# Patient Record
Sex: MALE
[De-identification: names, ages, dates, MRNs, and addresses within clinical notes are randomized per-mention and may not be internally consistent; named-entity substitution may affect disease eponyms.]

---

## 2022-10-18 PROBLEM — Z00.00 ENCOUNTER FOR PREVENTIVE HEALTH EXAMINATION: Status: ACTIVE | Noted: 2022-10-18

## 2022-10-21 ENCOUNTER — APPOINTMENT (OUTPATIENT)
Dept: ULTRASOUND IMAGING | Facility: IMAGING CENTER | Age: 72
End: 2022-10-21

## 2022-11-22 ENCOUNTER — INPATIENT (INPATIENT)
Facility: HOSPITAL | Age: 72
LOS: 7 days | Discharge: HOME | DRG: 640 | End: 2022-11-30
Attending: INTERNAL MEDICINE | Admitting: INTERNAL MEDICINE
Payer: MEDICARE

## 2022-11-22 VITALS
RESPIRATION RATE: 16 BRPM | SYSTOLIC BLOOD PRESSURE: 95 MMHG | WEIGHT: 154.98 LBS | DIASTOLIC BLOOD PRESSURE: 58 MMHG | OXYGEN SATURATION: 98 % | HEART RATE: 92 BPM | TEMPERATURE: 98 F | HEIGHT: 69 IN

## 2022-11-22 DIAGNOSIS — Z29.9 ENCOUNTER FOR PROPHYLACTIC MEASURES, UNSPECIFIED: ICD-10-CM
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
## 2022-11-22 DIAGNOSIS — C25.9 MALIGNANT NEOPLASM OF PANCREAS, UNSPECIFIED: ICD-10-CM

## 2022-11-22 DIAGNOSIS — R29.6 REPEATED FALLS: ICD-10-CM

## 2022-11-22 DIAGNOSIS — U07.1 COVID-19: ICD-10-CM

## 2022-11-22 DIAGNOSIS — I48.91 UNSPECIFIED ATRIAL FIBRILLATION: ICD-10-CM

## 2022-11-22 DIAGNOSIS — R11.2 NAUSEA WITH VOMITING, UNSPECIFIED: ICD-10-CM

## 2022-11-22 DIAGNOSIS — I10 ESSENTIAL (PRIMARY) HYPERTENSION: ICD-10-CM

## 2022-11-22 DIAGNOSIS — E86.0 DEHYDRATION: ICD-10-CM

## 2022-11-22 DIAGNOSIS — Z86.73 PERSONAL HISTORY OF TRANSIENT ISCHEMIC ATTACK (TIA), AND CEREBRAL INFARCTION WITHOUT RESIDUAL DEFICITS: ICD-10-CM

## 2022-11-22 LAB
ACETONE SERPL-MCNC: NEGATIVE — SIGNIFICANT CHANGE UP
ALBUMIN SERPL ELPH-MCNC: 2.9 G/DL — LOW (ref 3.5–5)
ALP SERPL-CCNC: 593 U/L — HIGH (ref 40–120)
ALT FLD-CCNC: 309 U/L DA — HIGH (ref 10–60)
ANION GAP SERPL CALC-SCNC: 7 MMOL/L — SIGNIFICANT CHANGE UP (ref 5–17)
ANISOCYTOSIS BLD QL: SLIGHT — SIGNIFICANT CHANGE UP
APPEARANCE UR: CLEAR — SIGNIFICANT CHANGE UP
APTT BLD: 36 SEC — HIGH (ref 27.5–35.5)
AST SERPL-CCNC: 113 U/L — HIGH (ref 10–40)
BACTERIA # UR AUTO: ABNORMAL /HPF
BASOPHILS # BLD AUTO: 0 K/UL — SIGNIFICANT CHANGE UP (ref 0–0.2)
BASOPHILS NFR BLD AUTO: 0 % — SIGNIFICANT CHANGE UP (ref 0–2)
BILIRUB SERPL-MCNC: 1.5 MG/DL — HIGH (ref 0.2–1.2)
BILIRUB UR-MCNC: NEGATIVE — SIGNIFICANT CHANGE UP
BUN SERPL-MCNC: 14 MG/DL — SIGNIFICANT CHANGE UP (ref 7–18)
CALCIUM SERPL-MCNC: 9.1 MG/DL — SIGNIFICANT CHANGE UP (ref 8.4–10.5)
CHLORIDE SERPL-SCNC: 102 MMOL/L — SIGNIFICANT CHANGE UP (ref 96–108)
CO2 SERPL-SCNC: 29 MMOL/L — SIGNIFICANT CHANGE UP (ref 22–31)
COD CRY URNS QL: ABNORMAL /HPF
COLOR SPEC: ABNORMAL
COMMENT - URINE: SIGNIFICANT CHANGE UP
CREAT SERPL-MCNC: 0.77 MG/DL — SIGNIFICANT CHANGE UP (ref 0.5–1.3)
DIFF PNL FLD: NEGATIVE — SIGNIFICANT CHANGE UP
EGFR: 95 ML/MIN/1.73M2 — SIGNIFICANT CHANGE UP
EOSINOPHIL # BLD AUTO: 0.01 K/UL — SIGNIFICANT CHANGE UP (ref 0–0.5)
EOSINOPHIL NFR BLD AUTO: 0.3 % — SIGNIFICANT CHANGE UP (ref 0–6)
EPI CELLS # UR: SIGNIFICANT CHANGE UP /HPF
GLUCOSE BLDC GLUCOMTR-MCNC: 121 MG/DL — HIGH (ref 70–99)
GLUCOSE SERPL-MCNC: 154 MG/DL — HIGH (ref 70–99)
GLUCOSE UR QL: NEGATIVE — SIGNIFICANT CHANGE UP
HCT VFR BLD CALC: 32.7 % — LOW (ref 39–50)
HGB BLD-MCNC: 10.8 G/DL — LOW (ref 13–17)
IMM GRANULOCYTES NFR BLD AUTO: 1.3 % — HIGH (ref 0–0.9)
INR BLD: 1.28 RATIO — HIGH (ref 0.88–1.16)
KETONES UR-MCNC: ABNORMAL
LEUKOCYTE ESTERASE UR-ACNC: ABNORMAL
LIDOCAIN IGE QN: 26 U/L — LOW (ref 73–393)
LYMPHOCYTES # BLD AUTO: 0.16 K/UL — LOW (ref 1–3.3)
LYMPHOCYTES # BLD AUTO: 5.2 % — LOW (ref 13–44)
MAGNESIUM SERPL-MCNC: 2.4 MG/DL — SIGNIFICANT CHANGE UP (ref 1.6–2.6)
MANUAL SMEAR VERIFICATION: SIGNIFICANT CHANGE UP
MCHC RBC-ENTMCNC: 31.9 PG — SIGNIFICANT CHANGE UP (ref 27–34)
MCHC RBC-ENTMCNC: 33 GM/DL — SIGNIFICANT CHANGE UP (ref 32–36)
MCV RBC AUTO: 96.5 FL — SIGNIFICANT CHANGE UP (ref 80–100)
MONOCYTES # BLD AUTO: 0.03 K/UL — SIGNIFICANT CHANGE UP (ref 0–0.9)
MONOCYTES NFR BLD AUTO: 1 % — LOW (ref 2–14)
NEUTROPHILS # BLD AUTO: 2.85 K/UL — SIGNIFICANT CHANGE UP (ref 1.8–7.4)
NEUTROPHILS NFR BLD AUTO: 92.2 % — HIGH (ref 43–77)
NITRITE UR-MCNC: NEGATIVE — SIGNIFICANT CHANGE UP
NRBC # BLD: 0 /100 WBCS — SIGNIFICANT CHANGE UP (ref 0–0)
PH UR: 7 — SIGNIFICANT CHANGE UP (ref 5–8)
PLAT MORPH BLD: NORMAL — SIGNIFICANT CHANGE UP
PLATELET # BLD AUTO: 104 K/UL — LOW (ref 150–400)
PLATELET COUNT - ESTIMATE: ABNORMAL
POIKILOCYTOSIS BLD QL AUTO: SLIGHT — SIGNIFICANT CHANGE UP
POTASSIUM SERPL-MCNC: 4.2 MMOL/L — SIGNIFICANT CHANGE UP (ref 3.5–5.3)
POTASSIUM SERPL-SCNC: 4.2 MMOL/L — SIGNIFICANT CHANGE UP (ref 3.5–5.3)
PROT SERPL-MCNC: 6 G/DL — SIGNIFICANT CHANGE UP (ref 6–8.3)
PROT UR-MCNC: 30 MG/DL
PROTHROM AB SERPL-ACNC: 15.3 SEC — HIGH (ref 10.5–13.4)
RAPID RVP RESULT: DETECTED
RBC # BLD: 3.39 M/UL — LOW (ref 4.2–5.8)
RBC # FLD: 12.4 % — SIGNIFICANT CHANGE UP (ref 10.3–14.5)
RBC BLD AUTO: ABNORMAL
RBC CASTS # UR COMP ASSIST: SIGNIFICANT CHANGE UP /HPF (ref 0–2)
SARS-COV-2 RNA SPEC QL NAA+PROBE: DETECTED
SODIUM SERPL-SCNC: 138 MMOL/L — SIGNIFICANT CHANGE UP (ref 135–145)
SP GR SPEC: 1.01 — SIGNIFICANT CHANGE UP (ref 1.01–1.02)
TROPONIN I, HIGH SENSITIVITY RESULT: 5.5 NG/L — SIGNIFICANT CHANGE UP
UROBILINOGEN FLD QL: 8
WBC # BLD: 3.09 K/UL — LOW (ref 3.8–10.5)
WBC # FLD AUTO: 3.09 K/UL — LOW (ref 3.8–10.5)
WBC UR QL: SIGNIFICANT CHANGE UP /HPF (ref 0–5)

## 2022-11-22 PROCEDURE — 99285 EMERGENCY DEPT VISIT HI MDM: CPT | Mod: CS

## 2022-11-22 PROCEDURE — 71045 X-RAY EXAM CHEST 1 VIEW: CPT | Mod: 26

## 2022-11-22 RX ORDER — SENNA PLUS 8.6 MG/1
1 TABLET ORAL AT BEDTIME
Refills: 0 | Status: DISCONTINUED | OUTPATIENT
Start: 2022-11-22 | End: 2022-11-30

## 2022-11-22 RX ORDER — DOXAZOSIN MESYLATE 4 MG
4 TABLET ORAL AT BEDTIME
Refills: 0 | Status: DISCONTINUED | OUTPATIENT
Start: 2022-11-22 | End: 2022-11-30

## 2022-11-22 RX ORDER — ATORVASTATIN CALCIUM 80 MG/1
20 TABLET, FILM COATED ORAL AT BEDTIME
Refills: 0 | Status: DISCONTINUED | OUTPATIENT
Start: 2022-11-22 | End: 2022-11-30

## 2022-11-22 RX ORDER — ONDANSETRON 8 MG/1
8 TABLET, FILM COATED ORAL EVERY 6 HOURS
Refills: 0 | Status: DISCONTINUED | OUTPATIENT
Start: 2022-11-22 | End: 2022-11-24

## 2022-11-22 RX ORDER — ACETAMINOPHEN 500 MG
650 TABLET ORAL EVERY 6 HOURS
Refills: 0 | Status: DISCONTINUED | OUTPATIENT
Start: 2022-11-22 | End: 2022-11-30

## 2022-11-22 RX ORDER — HYDROMORPHONE HYDROCHLORIDE 2 MG/ML
2 INJECTION INTRAMUSCULAR; INTRAVENOUS; SUBCUTANEOUS EVERY 6 HOURS
Refills: 0 | Status: DISCONTINUED | OUTPATIENT
Start: 2022-11-22 | End: 2022-11-23

## 2022-11-22 RX ORDER — DEXAMETHASONE 0.5 MG/5ML
6 ELIXIR ORAL ONCE
Refills: 0 | Status: COMPLETED | OUTPATIENT
Start: 2022-11-22 | End: 2022-11-22

## 2022-11-22 RX ORDER — FAMOTIDINE 10 MG/ML
20 INJECTION INTRAVENOUS ONCE
Refills: 0 | Status: COMPLETED | OUTPATIENT
Start: 2022-11-22 | End: 2022-11-22

## 2022-11-22 RX ORDER — DILTIAZEM HCL 120 MG
120 CAPSULE, EXT RELEASE 24 HR ORAL DAILY
Refills: 0 | Status: DISCONTINUED | OUTPATIENT
Start: 2022-11-22 | End: 2022-11-30

## 2022-11-22 RX ORDER — LANOLIN ALCOHOL/MO/W.PET/CERES
3 CREAM (GRAM) TOPICAL AT BEDTIME
Refills: 0 | Status: DISCONTINUED | OUTPATIENT
Start: 2022-11-22 | End: 2022-11-30

## 2022-11-22 RX ORDER — SODIUM CHLORIDE 9 MG/ML
1000 INJECTION, SOLUTION INTRAVENOUS
Refills: 0 | Status: DISCONTINUED | OUTPATIENT
Start: 2022-11-22 | End: 2022-11-29

## 2022-11-22 RX ORDER — DABIGATRAN ETEXILATE MESYLATE 150 MG/1
150 CAPSULE ORAL EVERY 12 HOURS
Refills: 0 | Status: DISCONTINUED | OUTPATIENT
Start: 2022-11-22 | End: 2022-11-30

## 2022-11-22 RX ORDER — ONDANSETRON 8 MG/1
4 TABLET, FILM COATED ORAL ONCE
Refills: 0 | Status: COMPLETED | OUTPATIENT
Start: 2022-11-22 | End: 2022-11-22

## 2022-11-22 RX ORDER — NALOXONE HYDROCHLORIDE 4 MG/.1ML
4 SPRAY NASAL ONCE
Refills: 0 | Status: DISCONTINUED | OUTPATIENT
Start: 2022-11-22 | End: 2022-11-30

## 2022-11-22 RX ORDER — SODIUM CHLORIDE 9 MG/ML
1000 INJECTION INTRAMUSCULAR; INTRAVENOUS; SUBCUTANEOUS
Refills: 0 | Status: DISCONTINUED | OUTPATIENT
Start: 2022-11-22 | End: 2022-11-22

## 2022-11-22 RX ORDER — FENTANYL CITRATE 50 UG/ML
1 INJECTION INTRAVENOUS
Refills: 0 | Status: DISCONTINUED | OUTPATIENT
Start: 2022-11-22 | End: 2022-11-23

## 2022-11-22 RX ORDER — HYDROMORPHONE HYDROCHLORIDE 2 MG/ML
2 INJECTION INTRAMUSCULAR; INTRAVENOUS; SUBCUTANEOUS EVERY 8 HOURS
Refills: 0 | Status: DISCONTINUED | OUTPATIENT
Start: 2022-11-22 | End: 2022-11-22

## 2022-11-22 RX ORDER — AMIODARONE HYDROCHLORIDE 400 MG/1
100 TABLET ORAL DAILY
Refills: 0 | Status: DISCONTINUED | OUTPATIENT
Start: 2022-11-22 | End: 2022-11-30

## 2022-11-22 RX ORDER — LOSARTAN POTASSIUM 100 MG/1
100 TABLET, FILM COATED ORAL DAILY
Refills: 0 | Status: DISCONTINUED | OUTPATIENT
Start: 2022-11-22 | End: 2022-11-30

## 2022-11-22 RX ORDER — SODIUM CHLORIDE 9 MG/ML
1000 INJECTION INTRAMUSCULAR; INTRAVENOUS; SUBCUTANEOUS ONCE
Refills: 0 | Status: COMPLETED | OUTPATIENT
Start: 2022-11-22 | End: 2022-11-22

## 2022-11-22 RX ADMIN — ATORVASTATIN CALCIUM 20 MILLIGRAM(S): 80 TABLET, FILM COATED ORAL at 22:13

## 2022-11-22 RX ADMIN — FAMOTIDINE 20 MILLIGRAM(S): 10 INJECTION INTRAVENOUS at 13:33

## 2022-11-22 RX ADMIN — HYDROMORPHONE HYDROCHLORIDE 2 MILLIGRAM(S): 2 INJECTION INTRAMUSCULAR; INTRAVENOUS; SUBCUTANEOUS at 21:10

## 2022-11-22 RX ADMIN — SENNA PLUS 1 TABLET(S): 8.6 TABLET ORAL at 22:14

## 2022-11-22 RX ADMIN — FENTANYL CITRATE 1 PATCH: 50 INJECTION INTRAVENOUS at 20:17

## 2022-11-22 RX ADMIN — ONDANSETRON 8 MILLIGRAM(S): 8 TABLET, FILM COATED ORAL at 20:12

## 2022-11-22 RX ADMIN — SODIUM CHLORIDE 100 MILLILITER(S): 9 INJECTION, SOLUTION INTRAVENOUS at 18:46

## 2022-11-22 RX ADMIN — Medication 4 MILLIGRAM(S): at 22:13

## 2022-11-22 RX ADMIN — DABIGATRAN ETEXILATE MESYLATE 150 MILLIGRAM(S): 150 CAPSULE ORAL at 18:45

## 2022-11-22 RX ADMIN — Medication 6 MILLIGRAM(S): at 18:45

## 2022-11-22 RX ADMIN — SODIUM CHLORIDE 1000 MILLILITER(S): 9 INJECTION INTRAMUSCULAR; INTRAVENOUS; SUBCUTANEOUS at 14:30

## 2022-11-22 RX ADMIN — SODIUM CHLORIDE 125 MILLILITER(S): 9 INJECTION INTRAMUSCULAR; INTRAVENOUS; SUBCUTANEOUS at 13:30

## 2022-11-22 RX ADMIN — SODIUM CHLORIDE 1000 MILLILITER(S): 9 INJECTION INTRAMUSCULAR; INTRAVENOUS; SUBCUTANEOUS at 13:30

## 2022-11-22 RX ADMIN — ONDANSETRON 4 MILLIGRAM(S): 8 TABLET, FILM COATED ORAL at 13:20

## 2022-11-22 RX ADMIN — FENTANYL CITRATE 1 PATCH: 50 INJECTION INTRAVENOUS at 18:46

## 2022-11-22 RX ADMIN — HYDROMORPHONE HYDROCHLORIDE 2 MILLIGRAM(S): 2 INJECTION INTRAMUSCULAR; INTRAVENOUS; SUBCUTANEOUS at 20:12

## 2022-11-22 NOTE — H&P ADULT - ATTENDING COMMENTS
72 male PMHx HTN, Afib (on pradaxa), and recently dx stage IV pancreatic cancer (inoperable, on chemo) presents with intractable nausea and vomiting for 2 days. Patient was diagnosed with pancreatic cancer at [facility] during a hospital admission for CVA. Pt was recently started on chemotherapy and received his last session this past friday 11/18. Since then patient has had severe nausea and multiple episodes of NBNB vomiting unrelieved by his compazine or prochlorperazine which he was prescribed by his oncologist. Patient has also had loss of appetite unrelated to his nausea and epigastric pain. Patient endorses dizziness as well and had a presyncopal episode this morning, denies head trauma or loss of consciousness Patient denies fever chills, headache, chest pain, palpitations, cough, SOB, diarrhea/constipation, dysuria or leg swelling. NKDA. [place] Lul[place] [facility] [place]  seen an dexamiend vsstable afebrile alert awake not in nay distress  metast ca pancrease new on chemo  2nd session received and since feeling very weak, nausea and vomiting  denies fever, dysurea, cough, sore throat    alert awake in isolated room ( asymptomatic COVID)  alert awake  physical doen ok  lungs heart ok  abd soft   labs noted wbc 3.09  hgb 10.8  plat 104   AP over 593 , , ALT OVER 300   CXR NEG.  UA PENDING   PANCULTURES  NC DOESN'T SEEM NEUTROPENIC  ONCOLOGY ,  CONSULLT WATCH FOR ANY FEVER   o2 SAT IS OK

## 2022-11-22 NOTE — ED ADULT NURSE NOTE - OBJECTIVE STATEMENT
pt is here for dizziness.  pt stated that pancreas CA, c/o dizziness and weakness, last chemo was Friday, denied chest pain or sob,

## 2022-11-22 NOTE — ED PROVIDER NOTE - CARE PLAN
1 Principal Discharge DX:	Dehydration  Secondary Diagnosis:	Hyperglycemia  Secondary Diagnosis:	Weakness

## 2022-11-22 NOTE — ED ADULT NURSE NOTE - NSIMPLEMENTINTERV_GEN_ALL_ED
Implemented All Fall Risk Interventions:  [place] to call system. Call bell, personal items and telephone within reach. Instruct patient to call for assistance. Room bathroom lighting operational. Non-slip footwear when patient is off stretcher. Physically safe environment: no spills, clutter or unnecessary equipment. Stretcher in lowest position, wheels locked, appropriate side rails in place. Provide visual cue, wrist band, yellow gown, etc. Monitor gait and stability. Monitor for mental status changes and reorient to person, place, and time. Review medications for side effects contributing to fall risk. Reinforce activity limits and safety measures with patient and family.

## 2022-11-22 NOTE — ED ADULT TRIAGE NOTE - HEIGHT IN FEET
"[facility]  Adult Nutrition  Progress Note    SUMMARY       Recommendations    Recommendation:   1. Encourage intake at meals as tolerated.   2. Await further diet recs from .   3. Add Boost Plus bid.    Goals:   Pt to consume at least 50-75% intake at meals  Nutrition Goal Status: new  Communication of RD Recs: reviewed with RN(Tere)    Reason for Assessment  Reason For Assessment: RD follow-up  Diagnosis: cardiac disease(STEMI)  Relevant Medical History: dementia  General Information Comments: Pt on Full Liquid nectar thick liquids. Pt with fair intake at meals per RN. Unable to complete NFPE at this time-pt asleep and with restraints  Nutrition Discharge Planning: d/c diet to be determined    Nutrition Risk Screen  Nutrition Risk Screen: dysphagia or difficulty swallowing    Nutrition/Diet History  Patient Reported Diet/Restrictions/Preferences: soft  Typical Food/Fluid Intake: Per chart, pt lives in NH  Spiritual, Cultural Beliefs, Catholic Practices, Values that Affect Care: no  Food Allergies: NKFA  Factors Affecting Nutritional Intake: impaired cognitive status/motor control    Anthropometrics  Temp: 98.5 °F (36.9 °C)  Height Method: Stated  Height: 5' 9" (175.3 cm)  Height (inches): 69 in  Weight Method: Bed Scale  Weight: 59.2 kg (130 lb 8.2 oz)  Weight (lb): 130.51 lb  Ideal Body Weight (IBW), Male: 160 lb  % Ideal Body Weight, Male (lb): 88.75 lb  BMI (Calculated): 21  BMI Grade: 18.5-24.9 - normal     Lab/Procedures/Meds  Pertinent Labs Reviewed: reviewed  Pertinent Labs Comments: K 2.9L, Glu 112H, Alb 2.7L  Pertinent Medications Reviewed: reviewed  Pertinent Medications Comments: aspirin, precedex    Estimated/Assessed Needs  Weight Used For Calorie Calculations: 59.2 kg (130 lb 8.2 oz)  Energy Calorie Requirements (kcal): 2072 (35 kcal/kg)  Energy Need Method: Kcal/kg  Protein Requirements: 71g (1.2g/kg)  Weight Used For Protein Calculations: 59.2 kg (130 lb 8.2 oz)  Estimated " Fluid Requirement Method: RDA Method  RDA Method (mL): 2072  CHO Requirement: 50%    Nutrition Prescription Ordered  Current Diet Order: Full Liquid nectar thick liquids    Evaluation of Received Nutrient/Fluid Intake  I/O: 1000/500  Energy Calories Required: not meeting needs  Protein Required: not meeting needs  Fluid Required: not meeting needs  Comments: LBM 3/31  % Intake of Estimated Energy Needs: 25 - 50 %  % Meal Intake: 25 - 50 %    Nutrition Risk  Level of Risk/Frequency of Follow-up: (2xweekly)     Assessment and Plan   Acute respiratory failure  Nutrition Problem  Inadequate oral intake     Related to (etiology):   Recent STEMI with need for BI-pap  Previous Medical History of Dysphagia     Signs and Symptoms (as evidenced by):   NPO status      Interventions:  Collaboration with other providers      Nutrition Diagnosis Status:   Improving      Monitor and Evaluation  Food and Nutrient Intake: food and beverage intake  Food and Nutrient Adminstration: diet order  Anthropometric Measurements: weight, weight change, body mass index  Biochemical Data, Medical Tests and Procedures: electrolyte and renal panel, gastrointestinal profile, glucose/endocrine profile  Nutrition-Focused Physical Findings: overall appearance     Malnutrition Assessment  Malnutrition Type: acute illness or injury    Nutrition Follow-Up  RD Follow-up?: Yes     5

## 2022-11-22 NOTE — H&P ADULT - ASSESSMENT
72M with PMHx HTN, Afib recently dx stage IV pancreatic Ca  p/w nausea and vomiting for 2 days since chemo. Admitted for symptom control and dehydration.

## 2022-11-22 NOTE — H&P ADULT - PROBLEM SELECTOR PLAN 2
dx last month, inoperable, on active chemo  follows at [facility]  c/w home pain control regimen, fentayl patch and dilaudid PRN dx last month, inoperable, on active chemo  follows at [facility]  c/w home pain control regimen, fentanyl patch and dilaudid PRN dx last month, inoperable, on active chemo  follows at [facility]  c/w home pain control regimen, fentanyl patch and dilaudid PRN  Palliative to be consulted in the AM  Heme/Onc Varun consulted dx last month, inoperable, on active chemo  follows at [facility]  c/w home pain control regimen, fentanyl patch and dilaudid PRN  Palliative consulted  [facility]/Onc Varun consulted

## 2022-11-22 NOTE — H&P ADULT - NSICDXFAMILYHX_GEN_ALL_CORE_FT
FAMILY HISTORY:  Father  Still living? Unknown  FH: lung cancer, Age at diagnosis: Age Unknown    Mother  Still living? Unknown  FH: colon cancer, Age at diagnosis: Age Unknown

## 2022-11-22 NOTE — H&P ADULT - PROBLEM SELECTOR PLAN 1
pt p/w n/v abdominal pain  likely chemo induced  c/w zofran   start reglan and low dose ativan if not better controlled  can also try aprepitant if above meds ineffective  started on light IVF for dehydration pt p/w n/v abdominal pain  likely chemo induced  c/w zofran   start reglan and low dose ativan if not better controlled  can also try aprepitant if above meds ineffective  started on light IVF for dehydration  Clear liquid diet, advance as tolerated

## 2022-11-22 NOTE — H&P ADULT - PROBLEM SELECTOR PLAN 3
on losartan and cardizem  c/w home meds with parameters pt tested + for COVID  pt not hypoxic, but high risk due to active chemo  will start remdesivir pt tested + for COVID  pt not hypoxic, but high risk due to active chemo  will hold remdesivir due to transaminitis  will give 1 dose decadron per attending  Puldaniela Kee consulted

## 2022-11-22 NOTE — PATIENT PROFILE ADULT - FALL HARM RISK - HARM RISK INTERVENTIONS
Assistance with ambulation/Assistance OOB with selected safe patient handling equipment/Communicate Risk of Fall with Harm to all staff/Reinforce activity limits and safety measures with patient and family/Tailored Fall Risk Interventions/Use of alarms - bed, chair and/or voice tab/Visual Cue: Yellow wristband and red socks/Bed in lowest position, wheels locked, appropriate side rails in place/Call bell, personal items and telephone in reach/Instruct patient to call for assistance before getting out of bed or chair/Non-slip footwear when patient is out of bed/[place] to call system/Physically safe environment - no spills, clutter or unnecessary equipment/Purposeful Proactive Rounding/Room/bathroom lighting operational, light cord in reach

## 2022-11-22 NOTE — H&P ADULT - HISTORY OF PRESENT ILLNESS
71 male PMHx HTN, Afib (on pradaxa), and recently dx stage IV pancreatic cancer (inoperable, on chemo) presents with intractable nausea and vomiting for 2 days. Patient was diagnosed with pancreatic cancer at [facility] during a hospital admission for CVA. Pt was recently started on chemotherapy and received his last session this past friday 11/18. Since then patient has had severe nausea and multiple episodes of NBNB vomiting unrelieved by his compazine or prochlorperazine which he was prescribed by his oncologist. Patient has also had loss of appetite unrelated to his nausea and epigastric pain. Patient endorses dizziness as well and had a presyncopal episode this morning, denies head trauma or loss of conciouessness. Patient denies fever chills, headache, chest pain, palpitations, cough, SOB, diarrhea/constipation, dysuria or leg swelling. NKDA    In ED:   71 male PMHx HTN, Afib (on pradaxa), and recently dx stage IV pancreatic cancer (inoperable, on chemo) presents with intractable nausea and vomiting for 2 days. Patient was diagnosed with pancreatic cancer at [facility] during a hospital admission for CVA. Pt was recently started on chemotherapy and received his last session this past friday 11/18. Since then patient has had severe nausea and multiple episodes of NBNB vomiting unrelieved by his compazine or prochlorperazine which he was prescribed by his oncologist. Patient has also had loss of appetite unrelated to his nausea and epigastric pain. Patient endorses dizziness as well and had a presyncopal episode this morning, denies head trauma or loss of consciousness Patient denies fever chills, headache, chest pain, palpitations, cough, SOB, diarrhea/constipation, dysuria or leg swelling. NKDA. [place] [facility] [place] 72 [place] PMHx HTN, Afib (on pradaxa), and recently dx stage IV pancreatic cancer (inoperable, on chemo) presents with intractable nausea and vomiting for 2 days. Patient was diagnosed with pancreatic cancer at [facility] during a hospital admission for CVA. Pt was recently started on chemotherapy and received his last session this past friday 11/18. Since then patient has had severe nausea and multiple episodes of NBNB vomiting unrelieved by his compazine or prochlorperazine which he was prescribed by his oncologist. Patient has also had loss of appetite unrelated to his nausea and epigastric pain. Patient endorses dizziness as well and had a presyncopal episode this morning, denies head trauma or loss of consciousness Patient denies fever chills, headache, chest pain, palpitations, cough, SOB, diarrhea/constipation, dysuria or leg swelling. NKDA. [place] [facility] [place]

## 2022-11-22 NOTE — ED PROVIDER NOTE - CLINICAL SUMMARY MEDICAL DECISION MAKING FREE TEXT BOX
stage IV pancreatic ca, s/p chemo, now with vomiting, weakness, will get labs, give meds., poss admission

## 2022-11-22 NOTE — H&P ADULT - PROBLEM SELECTOR PLAN 4
c/w amiodarone, cardizem and pradaxa (home meds) on losartan and cardizem  c/w home meds with parameters no head trauma or LOC  f/u [facility]   f/u PT

## 2022-11-22 NOTE — H&P ADULT - NSHPPHYSICALEXAM_GEN_ALL_CORE
T(C): 36.7 (11-22-22 @ 11:56), Max: 36.7 (11-22-22 @ 11:56)  HR: 92 (11-22-22 @ 11:56) (92 - 92)  BP: 95/58 (11-22-22 @ 11:56) (95/58 - 95/58)  RR: 16 (11-22-22 @ 11:56) (16 - 16)  SpO2: 98% (11-22-22 @ 11:56) (98% - 98%)    GENERAL: patient appears well, NAD, pleasant  HEAD: normocephalic, atraumatic  EYES: sclera clear, no exudates  ENMT: oropharynx clear without erythema, no exudates, moist mucous membranes  NECK: supple, soft, no thyromegaly noted  LUNGS: clear to auscultation bilaterally, no wheezing, rhonchi or rales appreciated  HEART: S1/S2, +irregular rhythm, no murmurs noted, no lower extremity edema  GASTROINTESTINAL: abdomen is soft, +TTP epigastric, normoactive bowel sounds, no palpable masses  INTEGUMENT: good skin turgor, no lesions noted  MUSCULOSKELETAL: no clubbing or cyanosis, no obvious deformity  NEUROLOGIC: AAO x3, CNII-XII intact, no obvious sensorimotor deficits noted T(C): 36.7 (11-22-22 @ 11:56), Max: 36.7 (11-22-22 @ 11:56)  HR: 92 (11-22-22 @ 11:56) (92 - 92)  BP: 95/58 (11-22-22 @ 11:56) (95/58 - 95/58)  RR: 16 (11-22-22 @ 11:56) (16 - 16)  SpO2: 98% (11-22-22 @ 11:56) (98% - 98%)    GENERAL: patient appears well, NAD, pleasant  HEAD: normocephalic, atraumatic  EYES: sclera clear, no exudates  ENMT: oropharynx clear without erythema, no exudates, moist mucous membranes  NECK: supple, soft, no thyromegaly noted  LUNGS: clear to auscultation bilaterally, no wheezing, rhonchi or rales appreciated  HEART: S1/S2, +irregular rhythm, no murmurs noted, no lower extremity edema  GASTROINTESTINAL: abdomen is soft, +TTP epigastric, normoactive bowel sounds, no palpable masses  INTEGUMENT: good skin turgor, +R upper chest mediport, +fentanyl patch on L arm  MUSCULOSKELETAL: no clubbing or cyanosis, no obvious deformity  NEUROLOGIC: AAO x3, CNII-XII intact, no obvious sensorimotor deficits noted

## 2022-11-22 NOTE — ED PROVIDER NOTE - NSICDXPASTMEDICALHX_GEN_ALL_CORE_FT
PAST MEDICAL HISTORY:  Atrial fibrillation     History of TIAs     HTN (hypertension)     Hyperlipidemia     Pancreatic cancer

## 2022-11-22 NOTE — H&P ADULT - PROBLEM SELECTOR PLAN 5
Pradaxa c/w amiodarone, cardizem and pradaxa (home meds) on losartan and cardizem  c/w home meds with parameters

## 2022-11-22 NOTE — ED PROVIDER NOTE - OBJECTIVE STATEMENT
72 y.o. male Dx with stage IV pancreatic ca 1 mo. ago, inoperable, pt received 1st Chemo 3 Fri ago, pt received 2nd CT last Fri., c/o not feeling well, vomiting for past 2 days, no appetite, weakness, soft stool, chills, abd pain on & off for past mo., no fever, coughing, pt received COVID vaccine & booster 72 y.o. male Dx with stage IV pancreatic ca 1 mo. ago, inoperable, pt received 1st Chemo 3 Fri ago, pt received 2nd CT last Fri., c/o not feeling well, vomiting for past 2 days, no appetite, weakness, soft stool, chills, abd pain on & off for past mo., no fever, coughing, pt received COVID vaccine & booster.  Pt took Zofran with no relief

## 2022-11-22 NOTE — PATIENT PROFILE ADULT - FUNCTIONAL ASSESSMENT - BASIC MOBILITY 6.
4-calculated by average/Not able to assess (calculate score using [facility] averaging method)

## 2022-11-23 ENCOUNTER — TRANSCRIPTION ENCOUNTER (OUTPATIENT)
Age: 72
End: 2022-11-23

## 2022-11-23 DIAGNOSIS — Z51.5 ENCOUNTER FOR PALLIATIVE CARE: ICD-10-CM

## 2022-11-23 DIAGNOSIS — Z02.9 ENCOUNTER FOR ADMINISTRATIVE EXAMINATIONS, UNSPECIFIED: ICD-10-CM

## 2022-11-23 DIAGNOSIS — E44.0 MODERATE PROTEIN-CALORIE MALNUTRITION: ICD-10-CM

## 2022-11-23 DIAGNOSIS — G89.3 NEOPLASM RELATED PAIN (ACUTE) (CHRONIC): ICD-10-CM

## 2022-11-23 DIAGNOSIS — D61.818 OTHER PANCYTOPENIA: ICD-10-CM

## 2022-11-23 LAB
A1C WITH ESTIMATED AVERAGE GLUCOSE RESULT: 6.3 % — HIGH (ref 4–5.6)
ALBUMIN SERPL ELPH-MCNC: 2.7 G/DL — LOW (ref 3.5–5)
ALP SERPL-CCNC: 517 U/L — HIGH (ref 40–120)
ALT FLD-CCNC: 306 U/L DA — HIGH (ref 10–60)
ANION GAP SERPL CALC-SCNC: 6 MMOL/L — SIGNIFICANT CHANGE UP (ref 5–17)
ANISOCYTOSIS BLD QL: SLIGHT — SIGNIFICANT CHANGE UP
AST SERPL-CCNC: 112 U/L — HIGH (ref 10–40)
BASOPHILS # BLD AUTO: 0 K/UL — SIGNIFICANT CHANGE UP (ref 0–0.2)
BASOPHILS NFR BLD AUTO: 0 % — SIGNIFICANT CHANGE UP (ref 0–2)
BILIRUB SERPL-MCNC: 1.5 MG/DL — HIGH (ref 0.2–1.2)
BUN SERPL-MCNC: 13 MG/DL — SIGNIFICANT CHANGE UP (ref 7–18)
BURR CELLS BLD QL SMEAR: PRESENT — SIGNIFICANT CHANGE UP
CALCIUM SERPL-MCNC: 8.2 MG/DL — LOW (ref 8.4–10.5)
CHLORIDE SERPL-SCNC: 104 MMOL/L — SIGNIFICANT CHANGE UP (ref 96–108)
CO2 SERPL-SCNC: 29 MMOL/L — SIGNIFICANT CHANGE UP (ref 22–31)
CREAT SERPL-MCNC: 0.62 MG/DL — SIGNIFICANT CHANGE UP (ref 0.5–1.3)
EGFR: 102 ML/MIN/1.73M2 — SIGNIFICANT CHANGE UP
ELLIPTOCYTES BLD QL SMEAR: SLIGHT — SIGNIFICANT CHANGE UP
EOSINOPHIL # BLD AUTO: 0 K/UL — SIGNIFICANT CHANGE UP (ref 0–0.5)
EOSINOPHIL NFR BLD AUTO: 0 % — SIGNIFICANT CHANGE UP (ref 0–6)
ESTIMATED AVERAGE GLUCOSE: 134 MG/DL — HIGH (ref 68–114)
GLUCOSE SERPL-MCNC: 154 MG/DL — HIGH (ref 70–99)
HCT VFR BLD CALC: 30.2 % — LOW (ref 39–50)
HCV AB S/CO SERPL IA: 0.12 S/CO — SIGNIFICANT CHANGE UP (ref 0–0.99)
HCV AB SERPL-IMP: SIGNIFICANT CHANGE UP
HGB BLD-MCNC: 9.7 G/DL — LOW (ref 13–17)
HYPOCHROMIA BLD QL: SLIGHT — SIGNIFICANT CHANGE UP
LYMPHOCYTES # BLD AUTO: 0.05 K/UL — LOW (ref 1–3.3)
LYMPHOCYTES # BLD AUTO: 4.6 % — LOW (ref 13–44)
MAGNESIUM SERPL-MCNC: 2.1 MG/DL — SIGNIFICANT CHANGE UP (ref 1.6–2.6)
MANUAL SMEAR VERIFICATION: SIGNIFICANT CHANGE UP
MCHC RBC-ENTMCNC: 31.4 PG — SIGNIFICANT CHANGE UP (ref 27–34)
MCHC RBC-ENTMCNC: 32.1 GM/DL — SIGNIFICANT CHANGE UP (ref 32–36)
MCV RBC AUTO: 97.7 FL — SIGNIFICANT CHANGE UP (ref 80–100)
MONOCYTES # BLD AUTO: 0 K/UL — SIGNIFICANT CHANGE UP (ref 0–0.9)
MONOCYTES NFR BLD AUTO: 0 % — LOW (ref 2–14)
NEUTROPHILS # BLD AUTO: 0.98 K/UL — LOW (ref 1.8–7.4)
NEUTROPHILS NFR BLD AUTO: 95.4 % — HIGH (ref 43–77)
NRBC # BLD: 0 /100 — SIGNIFICANT CHANGE UP (ref 0–0)
PHOSPHATE SERPL-MCNC: 3.3 MG/DL — SIGNIFICANT CHANGE UP (ref 2.5–4.5)
PLAT MORPH BLD: NORMAL — SIGNIFICANT CHANGE UP
PLATELET # BLD AUTO: 86 K/UL — LOW (ref 150–400)
PLATELET COUNT - ESTIMATE: ABNORMAL
POIKILOCYTOSIS BLD QL AUTO: SLIGHT — SIGNIFICANT CHANGE UP
POLYCHROMASIA BLD QL SMEAR: SLIGHT — SIGNIFICANT CHANGE UP
POTASSIUM SERPL-MCNC: 3.9 MMOL/L — SIGNIFICANT CHANGE UP (ref 3.5–5.3)
POTASSIUM SERPL-SCNC: 3.9 MMOL/L — SIGNIFICANT CHANGE UP (ref 3.5–5.3)
PROT SERPL-MCNC: 5.5 G/DL — LOW (ref 6–8.3)
RBC # BLD: 3.09 M/UL — LOW (ref 4.2–5.8)
RBC # FLD: 12.4 % — SIGNIFICANT CHANGE UP (ref 10.3–14.5)
RBC BLD AUTO: ABNORMAL
SODIUM SERPL-SCNC: 139 MMOL/L — SIGNIFICANT CHANGE UP (ref 135–145)
WBC # BLD: 1.03 K/UL — LOW (ref 3.8–10.5)
WBC # FLD AUTO: 1.03 K/UL — LOW (ref 3.8–10.5)

## 2022-11-23 PROCEDURE — 99497 ADVNCD CARE PLAN 30 MIN: CPT | Mod: 25

## 2022-11-23 PROCEDURE — 70450 CT HEAD/BRAIN W/O DYE: CPT | Mod: 26

## 2022-11-23 PROCEDURE — 99223 1ST HOSP IP/OBS HIGH 75: CPT

## 2022-11-23 RX ORDER — HYDROMORPHONE HYDROCHLORIDE 2 MG/ML
2 INJECTION INTRAMUSCULAR; INTRAVENOUS; SUBCUTANEOUS EVERY 4 HOURS
Refills: 0 | Status: DISCONTINUED | OUTPATIENT
Start: 2022-11-23 | End: 2022-11-30

## 2022-11-23 RX ORDER — FENTANYL CITRATE 50 UG/ML
1 INJECTION INTRAVENOUS
Refills: 0 | Status: DISCONTINUED | OUTPATIENT
Start: 2022-11-23 | End: 2022-11-29

## 2022-11-23 RX ORDER — PANTOPRAZOLE SODIUM 20 MG/1
40 TABLET, DELAYED RELEASE ORAL
Refills: 0 | Status: DISCONTINUED | OUTPATIENT
Start: 2022-11-23 | End: 2022-11-30

## 2022-11-23 RX ADMIN — DABIGATRAN ETEXILATE MESYLATE 150 MILLIGRAM(S): 150 CAPSULE ORAL at 05:31

## 2022-11-23 RX ADMIN — LOSARTAN POTASSIUM 100 MILLIGRAM(S): 100 TABLET, FILM COATED ORAL at 05:30

## 2022-11-23 RX ADMIN — DABIGATRAN ETEXILATE MESYLATE 150 MILLIGRAM(S): 150 CAPSULE ORAL at 17:45

## 2022-11-23 RX ADMIN — FENTANYL CITRATE 1 PATCH: 50 INJECTION INTRAVENOUS at 07:34

## 2022-11-23 RX ADMIN — FENTANYL CITRATE 1 PATCH: 50 INJECTION INTRAVENOUS at 20:34

## 2022-11-23 RX ADMIN — ATORVASTATIN CALCIUM 20 MILLIGRAM(S): 80 TABLET, FILM COATED ORAL at 21:42

## 2022-11-23 RX ADMIN — Medication 120 MILLIGRAM(S): at 05:31

## 2022-11-23 RX ADMIN — HYDROMORPHONE HYDROCHLORIDE 2 MILLIGRAM(S): 2 INJECTION INTRAMUSCULAR; INTRAVENOUS; SUBCUTANEOUS at 10:55

## 2022-11-23 RX ADMIN — HYDROMORPHONE HYDROCHLORIDE 2 MILLIGRAM(S): 2 INJECTION INTRAMUSCULAR; INTRAVENOUS; SUBCUTANEOUS at 10:29

## 2022-11-23 RX ADMIN — HYDROMORPHONE HYDROCHLORIDE 2 MILLIGRAM(S): 2 INJECTION INTRAMUSCULAR; INTRAVENOUS; SUBCUTANEOUS at 18:25

## 2022-11-23 RX ADMIN — HYDROMORPHONE HYDROCHLORIDE 2 MILLIGRAM(S): 2 INJECTION INTRAMUSCULAR; INTRAVENOUS; SUBCUTANEOUS at 17:51

## 2022-11-23 RX ADMIN — SENNA PLUS 1 TABLET(S): 8.6 TABLET ORAL at 21:43

## 2022-11-23 RX ADMIN — Medication 4 MILLIGRAM(S): at 21:42

## 2022-11-23 RX ADMIN — ONDANSETRON 8 MILLIGRAM(S): 8 TABLET, FILM COATED ORAL at 14:53

## 2022-11-23 RX ADMIN — FENTANYL CITRATE 1 PATCH: 50 INJECTION INTRAVENOUS at 10:55

## 2022-11-23 RX ADMIN — AMIODARONE HYDROCHLORIDE 100 MILLIGRAM(S): 400 TABLET ORAL at 05:30

## 2022-11-23 RX ADMIN — ONDANSETRON 8 MILLIGRAM(S): 8 TABLET, FILM COATED ORAL at 05:50

## 2022-11-23 RX ADMIN — HYDROMORPHONE HYDROCHLORIDE 2 MILLIGRAM(S): 2 INJECTION INTRAMUSCULAR; INTRAVENOUS; SUBCUTANEOUS at 03:25

## 2022-11-23 RX ADMIN — HYDROMORPHONE HYDROCHLORIDE 2 MILLIGRAM(S): 2 INJECTION INTRAMUSCULAR; INTRAVENOUS; SUBCUTANEOUS at 04:20

## 2022-11-23 NOTE — CONSULT NOTE ADULT - PROBLEM SELECTOR RECOMMENDATION 6
-GOC as above, interested in palliative care alongside disease modifying treatments  -will consider DNR and MOLST, full code in interim

## 2022-11-23 NOTE — DIETITIAN NUTRITION RISK NOTIFICATION - TREATMENT: THE FOLLOWING DIET HAS BEEN RECOMMENDED
Diet, Consistent Carbohydrate Clear Liquid:   Supplement Feeding Modality:  Oral  Ensure Clear Cans or Servings Per Day:  1       Frequency:  Three Times a day (11-23-22 @ 13:34) [Pending Verification By Attending]  Diet, Clear Liquid (11-22-22 @ 16:17) [Active]

## 2022-11-23 NOTE — DIETITIAN INITIAL EVALUATION ADULT - ORAL INTAKE PTA/DIET HISTORY
Poor   Per pt. most consuming "small meals" provided by family/sister   States PCP prescribed "appetite stimulant" & has to be "" at pharmacy

## 2022-11-23 NOTE — PROGRESS NOTE ADULT - SUBJECTIVE AND OBJECTIVE BOX
HPI:  72 male PMHx HTN, Afib (on pradaxa), and recently dx stage IV pancreatic cancer (inoperable, on chemo) presents with intractable nausea and vomiting for 2 days. Patient was diagnosed with pancreatic cancer at [facility] during a hospital admission for CVA. Pt was recently started on chemotherapy and received his last session this past . Since then patient has had severe nausea and multiple episodes of NBNB vomiting unrelieved by his compazine or prochlorperazine which he was prescribed by his oncologist. Patient has also had loss of appetite unrelated to his nausea and epigastric pain. Patient endorses dizziness as well and had a presyncopal episode this morning, denies head trauma or loss of consciousness Patient denies fever chills, headache, chest pain, palpitations, cough, SOB, diarrhea/constipation, dysuria or leg swelling. NKDA. [place] [facility] [place] 15[place]29[place]      Patient is a 72y old  Male who presents with a chief complaint of Intractable vomiting (2022 16:47)      INTERVAL HPI/OVERNIGHT EVENTS:  T(C): 37.1 (22 @ 13:03), Max: 37.2 (22 @ 19:18)  HR: 67 (22 @ 13:03) (67 - 127)  BP: 116/68 (22 @ 13:03) (111/62 - 132/72)  RR: 20 (22 @ 13:03) (18 - 20)  SpO2: 100% (22 @ 13:03) (94% - 100%)  Wt(kg): --  I&O's Summary      REVIEW OF SYSTEMS: denies fever, chills, SOB, palpitations, chest pain, abdominal pain, nausea, vomitting, diarrhea, constipation, dizziness    MEDICATIONS  (STANDING):  aMIOdarone    Tablet 100 milliGRAM(s) Oral daily  atorvastatin 20 milliGRAM(s) Oral at bedtime  dabigatran 150 milliGRAM(s) Oral every 12 hours  diltiazem    milliGRAM(s) Oral daily  doxazosin 4 milliGRAM(s) Oral at bedtime  fentaNYL   Patch  50 MICROgram(s)/Hr 1 Patch Transdermal every 72 hours  lactated ringers. 1000 milliLiter(s) (100 mL/Hr) IV Continuous <Continuous>  losartan 100 milliGRAM(s) Oral daily  pantoprazole    Tablet 40 milliGRAM(s) Oral before breakfast  senna 1 Tablet(s) Oral at bedtime    MEDICATIONS  (PRN):  acetaminophen     Tablet .. 650 milliGRAM(s) Oral every 6 hours PRN Temp greater or equal to 38C (100.4F), Mild Pain (1 - 3)  aluminum hydroxide/magnesium hydroxide/simethicone Suspension 30 milliLiter(s) Oral every 4 hours PRN Dyspepsia  HYDROmorphone   Tablet 2 milliGRAM(s) Oral every 4 hours PRN Severe Pain (7 - 10)  melatonin 3 milliGRAM(s) Oral at bedtime PRN Insomnia  naloxone 1 mG/mL Injection for Intranasal Use 4 milliGRAM(s) IntraNasal once PRN unresponsiveness  ondansetron Injectable 8 milliGRAM(s) IV Push every 6 hours PRN Nausea and/or Vomiting      PHYSICAL EXAM:  GENERAL: NAD, well-groomed, well-developed  HEAD:  Atraumatic, Normocephalic  EYES: EOMI, PERRLA, conjunctiva and sclera clear  ENMT: No tonsillar erythema, exudates, or enlargement; Moist mucous membranes, Good dentition, No lesions  NECK: Supple, No JVD, Normal thyroid  NERVOUS SYSTEM:  Alert & Oriented X3, Good concentration; Motor Strength 5/5 B/L upper and lower extremities; DTRs 2+ intact and symmetric  CHEST/LUNG: Clear to percussion bilaterally; No rales, rhonchi, wheezing, or rubs  HEART: Regular rate and rhythm; No murmurs, rubs, or gallops  ABDOMEN: Soft, Nontender, Nondistended; Bowel sounds present  EXTREMITIES:  2+ Peripheral Pulses, No clubbing, cyanosis, or edema  LYMPH: No lymphadenopathy noted  SKIN: No rashes or lesions  LABS:                        9.7    1.03  )-----------( 86       ( 2022 06:28 )             30.2         139  |  104  |  13  ----------------------------<  154<H>  3.9   |  29  |  0.62    Ca    8.2<L>      2022 06:28  Phos  3.3       Mg     2.1         TPro  5.5<L>  /  Alb  2.7<L>  /  TBili  1.5<H>  /  DBili  x   /  AST  112<H>  /  ALT  306<H>  /  AlkPhos  517<H>      PT/INR - ( 2022 14:20 )   PT: 15.3 sec;   INR: 1.28 ratio         PTT - ( 2022 14:20 )  PTT:36.0 sec  Urinalysis Basic - ( 2022 22:00 )    Color: Michelle / Appearance: Clear / S.010 / pH: x  Gluc: x / Ketone: Trace  / Bili: Negative / Urobili: 8   Blood: x / Protein: 30 mg/dL / Nitrite: Negative   Leuk Esterase: Trace / RBC: 0-2 /HPF / WBC 3-5 /HPF   Sq Epi: x / Non Sq Epi: Few /HPF / Bacteria: Many /HPF      CAPILLARY BLOOD GLUCOSE            Urinalysis Basic - ( 2022 22:00 )    Color: Michelle / Appearance: Clear / S.010 / pH: x  Gluc: x / Ketone: Trace  / Bili: Negative / Urobili: 8   Blood: x / Protein: 30 mg/dL / Nitrite: Negative   Leuk Esterase: Trace / RBC: 0-2 /HPF / WBC 3-5 /HPF   Sq Epi: x / Non Sq Epi: Few /HPF / Bacteria: Many /HPF

## 2022-11-23 NOTE — PROGRESS NOTE ADULT - SUBJECTIVE AND OBJECTIVE BOX
NP Note discussed with  Primary Attending    INTERVAL HPI/OVERNIGHT EVENTS: remains comfortable, but complains of frequent nausea with vomiting. abdominal pain 7/10.     MEDICATIONS  (STANDING):  aMIOdarone    Tablet 100 milliGRAM(s) Oral daily  atorvastatin 20 milliGRAM(s) Oral at bedtime  dabigatran 150 milliGRAM(s) Oral every 12 hours  diltiazem    milliGRAM(s) Oral daily  doxazosin 4 milliGRAM(s) Oral at bedtime  fentaNYL   Patch  50 MICROgram(s)/Hr 1 Patch Transdermal every 72 hours  lactated ringers. 1000 milliLiter(s) (100 mL/Hr) IV Continuous <Continuous>  losartan 100 milliGRAM(s) Oral daily  senna 1 Tablet(s) Oral at bedtime    MEDICATIONS  (PRN):  acetaminophen     Tablet .. 650 milliGRAM(s) Oral every 6 hours PRN Temp greater or equal to 38C (100.4F), Mild Pain (1 - 3)  aluminum hydroxide/magnesium hydroxide/simethicone Suspension 30 milliLiter(s) Oral every 4 hours PRN Dyspepsia  HYDROmorphone   Tablet 2 milliGRAM(s) Oral every 4 hours PRN Severe Pain (7 - 10)  melatonin 3 milliGRAM(s) Oral at bedtime PRN Insomnia  naloxone 1 mG/mL Injection for Intranasal Use 4 milliGRAM(s) IntraNasal once PRN unresponsiveness  ondansetron Injectable 8 milliGRAM(s) IV Push every 6 hours PRN Nausea and/or Vomiting      __________________________________________________  REVIEW OF SYSTEMS:    CONSTITUTIONAL: No fever,   EYES: no acute visual disturbances  NECK: No pain or stiffness  RESPIRATORY: No cough; No shortness of breath  CARDIOVASCULAR: No chest pain, no palpitations  GASTROINTESTINAL: gastric pain. +  nausea w/ vomiting; No diarrhea   NEUROLOGICAL: No headache or numbness, no tremors  MUSCULOSKELETAL: No joint pain, no muscle pain  GENITOURINARY: no dysuria, no frequency, no hesitancy  PSYCHIATRY: no depression , no anxiety  ALL OTHER  ROS negative        Vital Signs Last 24 Hrs  T(C): 37.1 (2022 13:03), Max: 37.2 (2022 19:18)  T(F): 98.8 (2022 13:03), Max: 98.9 (2022 19:18)  HR: 67 (2022 13:03) (67 - 127)  BP: 116/68 (2022 13:03) (111/62 - 132/72)  BP(mean): --  RR: 20 (2022 13:03) (18 - 20)  SpO2: 100% (2022 13:03) (94% - 100%)    Parameters below as of 2022 13:03  Patient On (Oxygen Delivery Method): room air        ________________________________________________  PHYSICAL EXAM:  GENERAL: NAD cachectic  HEENT: Normocephalic;  conjunctivae and sclerae clear; moist mucous membranes;   NECK : supple  CHEST/LUNG: Clear to auscultation bilaterally with fair air entry   HEART: S1 S2  regular; no murmurs, gallops or rubs  ABDOMEN: Soft, Nontender, Nondistended; Bowel sounds present  EXTREMITIES: no cyanosis; no edema; no calf tenderness  SKIN: warm and dry; no rash  NERVOUS SYSTEM:  Awake and alert; Oriented  to place, person and time ; no new deficits    _________________________________________________  LABS:                        9.7    1.03  )-----------( 86       ( 2022 06:28 )             30.2     11-    139  |  104  |  13  ----------------------------<  154<H>  3.9   |  29  |  0.62    Ca    8.2<L>      2022 06:28  Phos  3.3     11-  Mg     2.1     11-    TPro  5.5<L>  /  Alb  2.7<L>  /  TBili  1.5<H>  /  DBili  x   /  AST  112<H>  /  ALT  306<H>  /  AlkPhos  517<H>  11    PT/INR - ( 2022 14:20 )   PT: 15.3 sec;   INR: 1.28 ratio         PTT - ( 2022 14:20 )  PTT:36.0 sec  Urinalysis Basic - ( 2022 22:00 )    Color: Michelle / Appearance: Clear / S.010 / pH: x  Gluc: x / Ketone: Trace  / Bili: Negative / Urobili: 8   Blood: x / Protein: 30 mg/dL / Nitrite: Negative   Leuk Esterase: Trace / RBC: 0-2 /HPF / WBC 3-5 /HPF   Sq Epi: x / Non Sq Epi: Few /HPF / Bacteria: Many /HPF      CAPILLARY BLOOD GLUCOSE      POCT Blood Glucose.: 121 mg/dL (2022 17:04)        RADIOLOGY & ADDITIONAL TESTS:    Imaging  Reviewed:  YES  < from: CT Head No Cont (22 @ 10:01) >    ACC: [id number] EXAM:  CT BRAIN                          PROCEDURE DATE:  2022          INTERPRETATION:  Noncontrast CT of the brain.    CLINICAL INDICATION:  Status post fall on anticoagulation, evaluate for   hemorrhage    TECHNIQUE : Axial CT scanning of the brain was obtained from the skull   base to the vertex without the administration of intravenous contrast.    COMPARISON: None available    FINDINGS:  No acute hemorrhage, hydrocephalus, midline shift or   extra-axial collections identified. Age-appropriate involutional changes   and mild microvascular ischemic changes are present.    No displaced calvarial fracture is visualized.    The orbits are not remarkable in appearance.    The visualized paranasal sinuses and tympanomastoid cavities are free of   acute disease.    IMPRESSION:    No acute hemorrhage, extra-axial collection or displaced calvarial   fracture.    --- End of Report ---            DAKOTAH WHITT MD; Attending Radiologist  This document has been electronically signed. 2022 10:23AM    < end of copied text >  < from: Xray Chest 1 View- PORTABLE-Urgent (22 @ 14:12) >    ACC: [id number] EXAM:  XR CHEST PORTABLE URGENT 1V                          PROCEDURE DATE:  2022      INTERPRETATION:  CLINICAL STATEMENT: Chest Pain.    TECHNIQUE: AP view of the chest.    COMPARISON: None available.    FINDINGS/  IMPRESSION:  Right Mediport noted. No consolidation or pleural effusion    Heart size cannot be accurately assessed in this projection.    --- End of Report ---    RAMIRO PARRISH MD; Attending Radiologist  This document has been electronically signed. 2022  6:11PM    < end of copied text >    Consultant(s) Notes Reviewed:   YES      Plan of care was discussed with patient and /or primary care giver; all questions and concerns were addressed

## 2022-11-23 NOTE — DISCHARGE NOTE PROVIDER - CARE PROVIDER_API CALL
Alonso Appiah  Phone: [phone]  Fax: (   )    -  Follow Up Time: 1 week    Tila Garcia/onc at [facility]  Phone: (   )    -  Fax: (   )    -  Follow Up Time: 1 week

## 2022-11-23 NOTE — DIETITIAN INITIAL EVALUATION ADULT - PERTINENT MEDS FT
MEDICATIONS  (STANDING):  aMIOdarone    Tablet 100 milliGRAM(s) Oral daily  atorvastatin 20 milliGRAM(s) Oral at bedtime  dabigatran 150 milliGRAM(s) Oral every 12 hours  diltiazem    milliGRAM(s) Oral daily  doxazosin 4 milliGRAM(s) Oral at bedtime  fentaNYL   Patch  50 MICROgram(s)/Hr 1 Patch Transdermal every 72 hours  lactated ringers. 1000 milliLiter(s) (100 mL/Hr) IV Continuous <Continuous>  losartan 100 milliGRAM(s) Oral daily  senna 1 Tablet(s) Oral at bedtime    MEDICATIONS  (PRN):  acetaminophen     Tablet .. 650 milliGRAM(s) Oral every 6 hours PRN Temp greater or equal to 38C (100.4F), Mild Pain (1 - 3)  aluminum hydroxide/magnesium hydroxide/simethicone Suspension 30 milliLiter(s) Oral every 4 hours PRN Dyspepsia  HYDROmorphone   Tablet 2 milliGRAM(s) Oral every 4 hours PRN Severe Pain (7 - 10)  melatonin 3 milliGRAM(s) Oral at bedtime PRN Insomnia  naloxone 1 mG/mL Injection for Intranasal Use 4 milliGRAM(s) IntraNasal once PRN unresponsiveness  ondansetron Injectable 8 milliGRAM(s) IV Push every 6 hours PRN Nausea and/or Vomiting

## 2022-11-23 NOTE — CONSULT NOTE ADULT - SUBJECTIVE AND OBJECTIVE BOX
Consult to: Discuss complex medical decision making related to goals of care    [facility] and Palliative Consult Service:  Ashely Farfan DO: cell ([phone])  Yadi Nagel MD: cell ([phone])   Benja Sofia NP: cell ([phone])   Lynnette Demian DNP: cell ([phone])  Froilan Scarlet LMSW: cell ([phone])   Laila Ballard NP: via [employer] Teams    Can contact any Palliative Team member via [employer] Teams for consults and questions      HPI:  72 male PMHx HTN, Afib (on pradaxa), and recently dx stage IV pancreatic cancer (inoperable, on chemo) presents with intractable nausea and vomiting for 2 days. Patient was diagnosed with pancreatic cancer at [facility] during a hospital admission for CVA. Pt was recently started on chemotherapy and received his last session this past . Since then patient has had severe nausea and multiple episodes of NBNB vomiting unrelieved by his compazine or prochlorperazine which he was prescribed by his oncologist. Patient has also had loss of appetite unrelated to his nausea and epigastric pain. Patient endorses dizziness as well and had a presyncopal episode this morning, denies head trauma or loss of consciousness Patient denies fever chills, headache, chest pain, palpitations, cough, SOB, diarrhea/constipation, dysuria or leg swelling. NKDA. Patient follows Dr. Lulú Garcia at [facility] (2022 15:29)    Interval history: Seen at the bedside, A/Ox3. Reports ongoing n/v, but slightly improved today. On clears, able to drink some.       PAST MEDICAL & SURGICAL HISTORY:  HTN (hypertension)      History of TIAs      Atrial fibrillation      Hyperlipidemia      Pancreatic cancer      No significant past surgical history          SOCIAL HISTORY:    Admitted from:  home    [ none ] Substance abuse, [ none ] Tobacco hx, [ none ] Alcohol hx, [ none ] Home Opioid Hx    FAMILY HISTORY:  FH: colon cancer (Mother)    FH: lung cancer (Father)     see H&P for history  Baseline ADLs (prior to admission): ambulating throughout his home, difficulty ambulating out in the community. 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 1000 milliLiter(s) (100 mL/Hr) IV Continuous <Continuous>  losartan 100 milliGRAM(s) Oral daily  senna 1 Tablet(s) Oral at bedtime    MEDICATIONS  (PRN):  acetaminophen     Tablet .. 650 milliGRAM(s) Oral every 6 hours PRN Temp greater or equal to 38C (100.4F), Mild Pain (1 - 3)  aluminum hydroxide/magnesium hydroxide/simethicone Suspension 30 milliLiter(s) Oral every 4 hours PRN Dyspepsia  HYDROmorphone   Tablet 2 milliGRAM(s) Oral every 4 hours PRN Severe Pain (7 - 10)  melatonin 3 milliGRAM(s) Oral at bedtime PRN Insomnia  naloxone 1 mG/mL Injection for Intranasal Use 4 milliGRAM(s) IntraNasal once PRN unresponsiveness  ondansetron Injectable 8 milliGRAM(s) IV Push every 6 hours PRN Nausea and/or Vomiting      PHYSICAL EXAM:  Vital Signs Last 24 Hrs  T(C): 37.1 (2022 13:03), Max: 37.2 (2022 19:18)  T(F): 98.8 (2022 13:03), Max: 98.9 (2022 19:18)  HR: 67 (2022 13:03) (67 - 127)  BP: 116/68 (2022 13:03) (111/62 - 132/72)  BP(mean): --  RR: 20 (2022 13:03) (18 - 20)  SpO2: 100% (2022 13:03) (94% - 100%)    Parameters below as of 2022 13:03  Patient On (Oxygen Delivery Method): room air        General: alert  oriented x 3  chronically ill appearing, A/ox3, verbal    Palliative Performance Scale/Karnofsky Score: 40-50%  ECOG Performance: 2/3    HEENT: no abnormal lesion, dry mouth    Lungs: nonlabor in RA  CV: RRR, S1S2  GI: soft non distended non tender  incontinent  : voiding  Musculoskeletal: weakness x4 ambulatory with assistance     Skin: no abnormal skin lesions, poor skin turgor  Neuro: no deficits, a/ox3  Oral intake ability: clear liquids    LABS:                        9.7    1.03  )-----------( 86       ( 2022 06:28 )             30.2     11-    139  |  104  |  13  ----------------------------<  154<H>  3.9   |  29  |  0.62    Ca    8.2<L>      2022 06:28  Phos  3.3     11  Mg     2.1         TPro  5.5<L>  /  Alb  2.7<L>  /  TBili  1.5<H>  /  DBili  x   /  AST  112<H>  /  ALT  306<H>  /  AlkPhos  517<H>      Urinalysis Basic - ( 2022 22:00 )    Color: Michelle / Appearance: Clear / S.010 / pH: x  Gluc: x / Ketone: Trace  / Bili: Negative / Urobili: 8   Blood: x / Protein: 30 mg/dL / Nitrite: Negative   Leuk Esterase: Trace / RBC: 0-2 /HPF / WBC 3-5 /HPF   Sq Epi: x / Non Sq Epi: Few /HPF / Bacteria: Many /HPF        RADIOLOGY & ADDITIONAL STUDIES:

## 2022-11-23 NOTE — DIETITIAN INITIAL EVALUATION ADULT - PERTINENT LABORATORY DATA
11-23    139  |  104  |  13  ----------------------------<  154<H>  3.9   |  29  |  0.62    Ca    8.2<L>      23 Nov 2022 06:28  Phos  3.3     11-23  Mg     2.1     11-23    TPro  5.5<L>  /  Alb  2.7<L>  /  TBili  1.5<H>  /  DBili  x   /  AST  112<H>  /  ALT  306<H>  /  AlkPhos  517<H>  11-23  POCT Blood Glucose.: 121 mg/dL (11-22-22 @ 17:04)  A1C with Estimated Average Glucose Result: 6.3 % (11-23-22 @ 06:28)

## 2022-11-23 NOTE — DISCHARGE NOTE PROVIDER - DETAILS OF MALNUTRITION DIAGNOSIS/DIAGNOSES
This patient has been assessed with a concern for Malnutrition and was treated during this hospitalization for the following Nutrition diagnosis/diagnoses:     -  11/23/2022: Severe protein-calorie malnutrition   -  11/23/2022: Underweight (BMI < 19)

## 2022-11-23 NOTE — DIETITIAN INITIAL EVALUATION ADULT - FACTORS AFF FOOD INTAKE
weakness, dehydration, abdominal pain, COVID infection/difficulty with food procurement/preparation/persistent lack of appetite/persistent nausea/vomiting/other (specify)

## 2022-11-23 NOTE — CONSULT NOTE ADULT - CONVERSATION DETAILS
Met with the pt at the bedside to discuss the GOC. 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 They do not have children and expressed that they are very close and only have each other. His sister also lives locally and helps him with his medical care. Discussed difference between palliative care and hospice care. He stated he wants to continue with all disease directed treatments as long as possible, but is open to hospice services if he is extremely debilitated and symptomatic. Follows with [facility] and pursuing palliative chemo, stated his oncologist will change regimen due to the severe n/v. He reports several recent hospitalizations at [place] and [facility]. He is not interested in PEDRO and would prefer home PT. He shared that he will need help at home but does not have any HHA at this time. Will have palliative SW follow-up with him. Also advised on home visiting MD and he is interested in this. Also introduced MOLST form and educated on benefits/burdens CPR vs DNR, he verbalized understanding of limited benefit of CPR and verbalized he would want a natural death but wishes to sleep on this before deciding and completing paperwork. Palliative care will follow.

## 2022-11-23 NOTE — DIETITIAN INITIAL EVALUATION ADULT - ADD RECOMMEND
Advance diet with nutrition supplement as medically feasible or may consider alternate nutrition support if NPO prolonged

## 2022-11-23 NOTE — DISCHARGE NOTE PROVIDER - PROVIDER TOKENS
FREE:[LAST:[Howieen],FIRST:[Alonso],PHONE:[phone]],FAX:[(   )    -],FOLLOWUP:[1 week]],FREE:[LAST:[Jose],FIRST:[Tila],PHONE:[(   )    -],FAX:[(   )    -],ADDRESS:[heme/onc at [facility]],FOLLOWUP:[1 week]]

## 2022-11-23 NOTE — CONSULT NOTE ADULT - PROBLEM SELECTOR RECOMMENDATION 3
-recently dx with stage IV pancreatic cancer (inoperable, on chemo) at [place] where he was admitted for CVA.   -pancytopenia, chemo-induced, WBC 1.7  -pancultures sent, pending

## 2022-11-23 NOTE — PROGRESS NOTE ADULT - PROBLEM SELECTOR PLAN 9
from home  f/u [facility] for chemo  palliative consulted  DC home when symptoms are relieved and f/u culture result.   + COVID

## 2022-11-23 NOTE — CONSULT NOTE ADULT - PROBLEM SELECTOR RECOMMENDATION 5
Clinical evidence indicates that the patient has Moderate protein calorie malnutrition/ 2nd degree -alb 2.7  In context of Chronic Illness (>1 month)  Energy/Food intake <75% of estimated energy requirement >7 days  Weight loss: Mild  (lbs lost recently)  Body Fat loss: Mild    Muscle mass loss: Mild   Fluid Accumulation: Mild    Strength: weakened Mild     Recommend:   c/w clear liquid diet, starting on marinol for appetite stimulation, dietary consult and supplement PRN

## 2022-11-23 NOTE — CONSULT NOTE ADULT - SUBJECTIVE AND OBJECTIVE BOX
Patient is a 72y old  Male who presents with a chief complaint of Intractable vomiting (22 Nov 2022 15:29)      HPI:  72 male PMHx HTN, Afib (on pradaxa), and recently dx stage IV pancreatic cancer (inoperable, on chemo) presents with intractable nausea and vomiting for 2 days. Patient was diagnosed with pancreatic cancer at [facility] during a hospital admission for CVA. Pt was recently started on chemotherapy and received his last session this past friday 11/18. Since then patient has had severe nausea and multiple episodes of NBNB vomiting unrelieved by his compazine or prochlorperazine which he was prescribed by his oncologist. Patient has also had loss of appetite unrelated to his nausea and epigastric pain. Patient endorses dizziness as well and had a presyncopal episode this morning, denies head trauma or loss of consciousness Patient denies fever chills, headache, chest pain, palpitations, cough, SOB, diarrhea/constipation, dysuria or leg swelling. NKDA. Patient follows [place] Lulú [place] [facility] (22 Nov 2022 15:29)       ROS:  Negative except for:    PAST MEDICAL & SURGICAL HISTORY:  HTN (hypertension)      History of TIAs      Atrial fibrillation      Hyperlipidemia      Pancreatic cancer      No significant past surgical history          SOCIAL HISTORY:    FAMILY HISTORY:  FH: colon cancer (Mother)    FH: lung cancer (Father)        MEDICATIONS  (STANDING):  aMIOdarone    Tablet 100 milliGRAM(s) Oral daily  atorvastatin 20 milliGRAM(s) Oral at bedtime  dabigatran 150 milliGRAM(s) Oral every 12 hours  diltiazem    milliGRAM(s) Oral daily  doxazosin 4 milliGRAM(s) Oral at bedtime  fentaNYL   Patch  12 MICROgram(s)/Hr 1 Patch Transdermal every 72 hours  lactated ringers. 1000 milliLiter(s) (100 mL/Hr) IV Continuous <Continuous>  losartan 100 milliGRAM(s) Oral daily  senna 1 Tablet(s) Oral at bedtime    MEDICATIONS  (PRN):  acetaminophen     Tablet .. 650 milliGRAM(s) Oral every 6 hours PRN Temp greater or equal to 38C (100.4F), Mild Pain (1 - 3)  aluminum hydroxide/magnesium hydroxide/simethicone Suspension 30 milliLiter(s) Oral every 4 hours PRN Dyspepsia  HYDROmorphone   Tablet 2 milliGRAM(s) Oral every 6 hours PRN Severe Pain (7 - 10)  melatonin 3 milliGRAM(s) Oral at bedtime PRN Insomnia  naloxone 1 mG/mL Injection for Intranasal Use 4 milliGRAM(s) IntraNasal once PRN unresponsiveness  ondansetron Injectable 8 milliGRAM(s) IV Push every 6 hours PRN Nausea and/or Vomiting      Allergies    No Known Allergies    Intolerances        Vital Signs Last 24 Hrs  T(C): 36.9 (23 Nov 2022 04:58), Max: 37.2 (22 Nov 2022 19:18)  T(F): 98.5 (23 Nov 2022 04:58), Max: 98.9 (22 Nov 2022 19:18)  HR: 73 (23 Nov 2022 04:58) (73 - 127)  BP: 123/66 (23 Nov 2022 04:58) (95/58 - 132/72)  BP(mean): --  RR: 20 (23 Nov 2022 04:58) (16 - 20)  SpO2: 97% (23 Nov 2022 04:58) (94% - 98%)    Parameters below as of 23 Nov 2022 04:58  Patient On (Oxygen Delivery Method): room air        PHYSICAL EXAM  General: adult in NAD  HEENT: clear oropharynx, anicteric sclera, pink conjunctiva  Neck: supple  CV: normal S1/S2 with no murmur rubs or gallops  Lungs: positive air movement b/l ant lungs,clear to auscultation, no wheezes, no rales  Abdomen: soft non-tender non-distended, no hepatosplenomegaly  Ext: no clubbing cyanosis or edema  Skin: no rashes and no petechiae  Neuro: alert and oriented X 4, no focal deficits      LABS:                          9.7    1.03  )-----------( 86       ( 23 Nov 2022 06:28 )             30.2         Mean Cell Volume : 97.7 fl  Mean Cell Hemoglobin : 31.4 pg  Mean Cell Hemoglobin Concentration : 32.1 gm/dL  Auto Neutrophil # : 0.98 K/uL  Auto Lymphocyte # : 0.05 K/uL  Auto Monocyte # : 0.00 K/uL  Auto Eosinophil # : 0.00 K/uL  Auto Basophil # : 0.00 K/uL  Auto Neutrophil % : 95.4 %  Auto Lymphocyte % : 4.6 %  Auto Monocyte % : 0.0 %  Auto Eosinophil % : 0.0 %  Auto Basophil % : 0.0 %      Serial CBC's  11-23 @ 06:28  Hct-30.2 / Hgb-9.7 / Plat-86 / RBC-3.09 / WBC-1.03  Serial CBC's  11-22 @ 13:15  Hct-32.7 / Hgb-10.8 / Plat-104 / RBC-3.39 / WBC-3.09      11-23    139  |  104  |  13  ----------------------------<  154<H>  3.9   |  29  |  0.62    Ca    8.2<L>      23 Nov 2022 06:28  Phos  3.3     11-23  Mg     2.1     11-23    TPro  5.5<L>  /  Alb  2.7<L>  /  TBili  1.5<H>  /  DBili  x   /  AST  112<H>  /  ALT  306<H>  /  AlkPhos  517<H>  11-23      PT/INR - ( 22 Nov 2022 14:20 )   PT: 15.3 sec;   INR: 1.28 ratio         PTT - ( 22 Nov 2022 14:20 )  PTT:36.0 sec                BLOOD SMEAR INTERPRETATION:       RADIOLOGY & ADDITIONAL STUDIES:

## 2022-11-23 NOTE — DIETITIAN INITIAL EVALUATION ADULT - OTHER INFO
Patient from home lives with family admitted for intractable vomiting & COVID+ on isolation precautions. Visited pt. alert, awake & breathing on room air, states with poor po intake >3 weeks, had 2 sessions of chemo. tx. however unable to tolerate due to increase nausea/vomiting, mostly occurring once consuming "foods & liquids", now worsening past 2 days PTA. Pt. admits to 16 Lbs weight loss, recalls  Lbs in Oct. 2022 with current wt. 160 Lbs. Presently pt. tolerating "sips' of liquid diet, reports of "feelings of nausea", receiving "IV Zofran" & on pain management, encourage age, accepted nutrition supplements, awaiting CT scan, Heme/Oncologist consulted.  Patient from home lives with family admitted for intractable vomiting & COVID+ on isolation precautions. Visited pt. alert, awake & breathing on room air, states with poor po intake >3 weeks, had 2 sessions of chemo. tx. however unable to tolerate due to increase nausea/vomiting, mostly occurring once consuming "foods & liquids", now worsening past 2 days PTA. Pt. admits to 16 Lbs weight loss, recalls  Lbs in Oct. 2022 with current wt. 160 Lbs. Presently pt. tolerating "sips' of liquid diet, reports of "feelings of nausea", receiving "IV Zofran" & on pain management, encourage po intake, accepted nutrition supplements, awaiting CT scan, Heme/Oncologist consulted.

## 2022-11-23 NOTE — CONSULT NOTE ADULT - PROBLEM SELECTOR RECOMMENDATION 9
-likely chemo-related  -s/p 1 dose decadron  -c/w zofran 8 mg IVP q6h PRN  -tolerating clears  -bowel regimen  -recommend gi ppx (is on pradaxa), will start protonix 40 mg oral daily

## 2022-11-23 NOTE — PROGRESS NOTE ADULT - PROBLEM SELECTOR PLAN 4
pt tested + for COVID  pt not hypoxic, but high risk due to active chemo  will hold remdesivir due to transaminitis  will give 1 dose decadron per attending  Puldaniela Kee consulted

## 2022-11-23 NOTE — CONSULT NOTE ADULT - PROBLEM SELECTOR RECOMMENDATION 2
-today started on increased fentanyl patch, now fentanyl patch 50 mcg/72h  -c/w dilaudid 2 mg PO q4h Severe pain

## 2022-11-23 NOTE — DISCHARGE NOTE PROVIDER - NSDCMRMEDTOKEN_GEN_ALL_CORE_FT
AMIODARONE 100MG TAB:   ATORVASTATIN 20 MG TABLET: TAKE 1 TABLET BY MOUTH EVERY DAY  DILTIAZEM 24H ER(CD) 120 MG CP: TAKE 1 CAPSULE BY MOUTH ONCE DAILY  DOXAZOSIN 4MG TAB:   FENTANYL 12 MCG/HR PATCH: APPLY TOPICALLY TO AFFECTED AREA EVERY 72 HOURS X 30 DAYS PLEASE APPLY FIRST THING IN THE MORNING  HYDROMORPHON 2MG TAB:   LOSARTAN POT 100MG TAB:   NALOXONE HCL 4 MG NASAL SPRAY: PLEASE SEE ATTACHED FOR DETAILED DIRECTIONS  ONDANSETRON 8MG ODT:   PRADAXA 150 MG CAPSULE: TAKE 1 CAPSULE BY MOUTH TWICE A DAY  PROCHLORPER 10MG TAB:   Senna 8.6 mg oral tablet: 1 tab(s) orally once a day (at bedtime)   AMIODARONE 100MG TAB: 1 tab(s) orally once a day  ATORVASTATIN 20 MG TABLET: 1 each orally once a day  DILTIAZEM 24H ER(CD) 120 MG CP: 1 each orally once a day  DOXAZOSIN 4MG TAB: 1 tab(s) orally once a day  dronabinol 2.5 mg oral capsule: 1 cap(s) orally 3 times a day MDD:7.5mg  FENTANYL 12 MCG/HR PATCH: APPLY TOPICALLY TO AFFECTED AREA EVERY 72 HOURS X 30 DAYS PLEASE APPLY FIRST THING IN THE MORNING  HYDROMORPHON 2MG TAB: 1 tab(s) orally every 6 hours, As Needed  LOSARTAN POT 100MG TAB: 1 tab(s) orally once a day  NALOXONE HCL 4 MG NASAL SPRAY: 1 spray(s) nasal every 2 to 3 minutes, As Needed  ONDANSETRON 8MG ODT: 1 each orally 3 times a day, As Needed  pantoprazole 40 mg oral delayed release tablet: 1 tab(s) orally once a day (before a meal)  PRADAXA 150 MG CAPSULE: 1 each orally 2 times a day  PROCHLORPER 10MG TAB: 1 tab(s) orally once a day  Senna 8.6 mg oral tablet: 1 tab(s) orally once a day (at bedtime)

## 2022-11-23 NOTE — DISCHARGE NOTE PROVIDER - HOSPITAL COURSE
72M with PMHx HTN, Afib recently dx stage IV pancreatic Ca  p/w nausea and vomiting for 2 days since chemo. Admitted for symptom control and dehydration. on IVF and antiemetic. Heme/onc followed. CTH result unremarkable. Palliative consulted. increased pain medication with fentanyl and Dilaudid per heme/onc. Pt with COVID + and pancytopenic,, comfortable on room air. sent work up to r/o infection. remains afebrile.     incomplete 11/23   Patient is 72M with PMHx HTN, Afib recently dx stage IV pancreatic Ca  p/w nausea and vomiting for 2 days since chemo. Admitted for symptom control and dehydration. on IVF and antiemetic. Heme/onc followed. CTH result unremarkable. Palliative consulted. increased pain medication with fentanyl and Dilaudid per heme/onc. Pt with COVID + and pancytopenic,, comfortable on room air. sent work up to r/o infection. remains afebrile. Diet advanced to regular, patient is tolerating well. CM consulted, patient is requesting HHA, he lives alone with wife and needs help at home.      Given patient's improved clinical status and current hemodynamic stability, decision was made to discharge.  Please refer to patient's complete medical chart with documents for a full hospital course, for this is only a brief summary.       Patient is 72M with PMHx HTN, Afib recently dx stage IV pancreatic Ca  p/w nausea and vomiting for 2 days since chemo. Admitted for symptom control and dehydration. on IVF and antiemetic. Heme/onc followed. CTH result unremarkable. Palliative consulted. increased pain medication with fentanyl and Dilaudid per heme/onc. Pt with COVID + and pancytopenic,, comfortable on room air. sent work up to r/o infection. remains afebrile. Diet advanced to regular, patient is tolerating well. CM consulted, patient is requesting HHA, he lives alone with wife and needs help at home.  Follow up with your oncology team at [facility] to resume your treatment.      Given patient's improved clinical status and current hemodynamic stability, decision was made to discharge.  Please refer to patient's complete medical chart with documents for a full hospital course, for this is only a brief summary.

## 2022-11-23 NOTE — CONSULT NOTE ADULT - PARTICIPANTS
IV out, tip intact without redness or swelling, Vital signs are stable, discharge instructions have been reviewed and signed, scripts were given. Care plans and education completed. Pt home with family. Patient

## 2022-11-23 NOTE — PROGRESS NOTE ADULT - ASSESSMENT
seen and examiend vsstable afebrile physical done ok  denies nausea vomiting  abd pain  no dysurea   lungs heart abd ok    heidi cath ok  no redness or tenderness  labs noted wbc 1.08 plat 86 lft high ua partially positive   a/p chemo rellated nausea vomiting  now better  no sign of any infection  no fever  no cough, no dysurea  UA minimaly pos  blood, urine cxs pending    seen by oncology   pts wbc going down will watch    covid pos but asymptomatic

## 2022-11-23 NOTE — PROGRESS NOTE ADULT - ASSESSMENT
72M with PMHx HTN, Afib recently dx stage IV pancreatic Ca  p/w nausea and vomiting for 2 days since chemo. Admitted for symptom control and dehydration. on IVF and antiemetic. Heme/onc followed. CTH result unremarkable. Palliative consulted. increased pain medication with fentanyl and Dilaudid per heme/onc. Pt with COVID + and pancytopenic,, comfortable on room air. sent work up to r/o infection. remains afebrile.

## 2022-11-23 NOTE — DISCHARGE NOTE PROVIDER - NSDCCPCAREPLAN_GEN_ALL_CORE_FT
PRINCIPAL DISCHARGE DIAGNOSIS  Diagnosis: Intractable nausea and vomiting  Assessment and Plan of Treatment: You were admitted for Intractable nausea and vomiting, likely due to chemotherapy. post fall, abdominal pain.   Started antiemetic. started clear diet. ----tolerate ???  Follow up with your PMD and heme/oncology.         SECONDARY DISCHARGE DIAGNOSES  Diagnosis: Pancytopenia  Assessment and Plan of Treatment: due to cancer. Urinalysis negative. Urine and blood culture sent -----  [place] [facility]. Lulú Garcia    Diagnosis: Hyperglycemia  Assessment and Plan of Treatment: due to pancreatic ca, no need for insulin therapy at home.   Eat a balanced diet.   Follow up with your primary MD.    Diagnosis: Fall  Assessment and Plan of Treatment: You had fall at home without injury. no loss of conciousness. CT head negative. Ambulatory independantly.   continue fall precauton.    Diagnosis: 2019 novel coronavirus disease (COVID-19)  Assessment and Plan of Treatment: You were found to have COVID positive. but no requirement oxygen therapy. asymptomatic.   will hold remdesivir due to transaminitis, you received 1 dose decadron in ED.   Ramón Kee consulted.    Diagnosis: Pancreatic cancer  Assessment and Plan of Treatment: [place] [facility] [place]ul[facility], Heme/Onc Varun consulted in house.  c/w home pain control regimen, fentanyl patch and dilaudid PRN  Increased Fentanly 50mcg and Dilaudid 2mg q4h prn per per heme/onc  Palliative consulted------      Diagnosis: Atrial fibrillation  Assessment and Plan of Treatment: c/w amiodarone, cardizem and pradaxa (home meds).  Follow up with PMD.   Atrial fibrillation is the most common heart rhythm problem.  The condition puts you at risk for has stroke and heart attack  It helps if you control your blood pressure, not drink more than 1-2 alcohol drinks per day, cut down on caffeine, getting treatment for over active thyroid gland, and get regular exercise  Call your doctor if you feel your heart racing or beating unusually, chest tightness or pain, lightheaded, faint, shortness of breath especially with exercise  It is important to take your heart medication as prescribed       Diagnosis: HTN (hypertension)  Assessment and Plan of Treatment: continue home med losartan and cardizem  Low salt diet  Activity as tolerated.  Follow up with your medical doctor for routine blood pressure monitoring at your next visit.  Notify your doctor if you have any of the following symptoms:   Dizziness, Lightheadedness, Blurry vision, Headache, Chest pain, Shortness of breath      Diagnosis: Moderate protein-calorie malnutrition  Assessment and Plan of Treatment: Eat a balanced diet. Follow up with your PMD.     PRINCIPAL DISCHARGE DIAGNOSIS  Diagnosis: Intractable nausea and vomiting  Assessment and Plan of Treatment: You were admitted for Intractable nausea and vomiting, likely due to chemotherapy. post fall, abdominal pain.   Started antiemetic. started clear diet and advanced to regular diet.  Take your nausea medications as needed 30 minutes before meals.  Follow up with your PMD and heme/oncology.         SECONDARY DISCHARGE DIAGNOSES  Diagnosis: Hyperglycemia  Assessment and Plan of Treatment: due to pancreatic ca, no need for insulin therapy at home.   Eat a balanced diet.   Follow up with your primary MD.    Diagnosis: Pancytopenia  Assessment and Plan of Treatment: due to cancer. Urinalysis negative. Urine and blood culture sent -----  Follow up heme/oncology at [facility]. Dr. Lulú Garcia    Diagnosis: Pancreatic cancer  Assessment and Plan of Treatment: continue f/u at [facility] Dr. Lulú Garcia, Heme/Onc Bond consulted in house.  c/w home pain control regimen, fentanyl patch and dilaudid PRN  Increased Fentanly 50mcg and Dilaudid 2mg q4h prn per per heme/onc  Follow up with your oncologist at [facility] to continue with treatment.      Diagnosis: 2019 novel coronavirus disease (COVID-19)  Assessment and Plan of Treatment: You were found to have COVID positive. but no requirement oxygen therapy. asymptomatic.   will hold remdesivir due to transaminitis, you received 1 dose decadron in ED.   Ramón Kee consulted.    Diagnosis: Fall  Assessment and Plan of Treatment: You had fall at home without injury. no loss of conciousness. CT head negative. Ambulatory independantly.   continue fall precauton.    Diagnosis: Atrial fibrillation  Assessment and Plan of Treatment: c/w amiodarone, cardizem and pradaxa (home meds).  Follow up with PMD.   Atrial fibrillation is the most common heart rhythm problem.  The condition puts you at risk for has stroke and heart attack  It helps if you control your blood pressure, not drink more than 1-2 alcohol drinks per day, cut down on caffeine, getting treatment for over active thyroid gland, and get regular exercise  Call your doctor if you feel your heart racing or beating unusually, chest tightness or pain, lightheaded, faint, shortness of breath especially with exercise  It is important to take your heart medication as prescribed       Diagnosis: Moderate protein-calorie malnutrition  Assessment and Plan of Treatment: Eat a balanced diet. Follow up with your PMD.    Diagnosis: HTN (hypertension)  Assessment and Plan of Treatment: continue home med losartan and cardizem  Low salt diet  Activity as tolerated.  Follow up with your medical doctor for routine blood pressure monitoring at your next visit.  Notify your doctor if you have any of the following symptoms:   Dizziness, Lightheadedness, Blurry vision, Headache, Chest pain, Shortness of breath

## 2022-11-23 NOTE — PROGRESS NOTE ADULT - PROBLEM SELECTOR PLAN 2
likely due to cancer  afebrile  UA negative  Blood CX and UCX sent in ED.   heme/onc dr Bond following.

## 2022-11-23 NOTE — PROGRESS NOTE ADULT - PROBLEM SELECTOR PLAN 1
pt p/w n/v abdominal pain  likely chemo induced  c/w zofran   start reglan and low dose ativan if not better controlled  can also try aprepitant if above meds ineffective  started on light IVF for dehydration  Clear liquid diet, advance as tolerated pt p/w n/v abdominal pain  likely chemo induced  c/w zofran   start reglan and low dose ativan if not better controlled  can also try aprepitant if above meds ineffective  started on light IVF for dehydration  Advance as tolerated

## 2022-11-23 NOTE — PROGRESS NOTE ADULT - PROBLEM SELECTOR PLAN 3
dx last month, inoperable, on active chemo  follows at [facility]  c/w home pain control regimen, fentanyl patch and dilaudid PRN---increased dose per heme/onc  Palliative consulted  Heme/Onc Bond consulted dx last month, inoperable, on active chemo (last 11/18, every friday per pt)  follows at [facility]  c/w home pain control regimen, fentanyl patch and dilaudid PRN  Increased Fentanly 50mcg and Dilaudid 2mg q4h prn per per heme/onc  Palliative consulted  Heme/Onc Bond consulted

## 2022-11-24 DIAGNOSIS — R11.2 NAUSEA WITH VOMITING, UNSPECIFIED: ICD-10-CM

## 2022-11-24 LAB
ALBUMIN SERPL ELPH-MCNC: 2.6 G/DL — LOW (ref 3.5–5)
ALP SERPL-CCNC: 476 U/L — HIGH (ref 40–120)
ALT FLD-CCNC: 314 U/L DA — HIGH (ref 10–60)
ANION GAP SERPL CALC-SCNC: 6 MMOL/L — SIGNIFICANT CHANGE UP (ref 5–17)
AST SERPL-CCNC: 101 U/L — HIGH (ref 10–40)
BILIRUB SERPL-MCNC: 1.1 MG/DL — SIGNIFICANT CHANGE UP (ref 0.2–1.2)
BUN SERPL-MCNC: 12 MG/DL — SIGNIFICANT CHANGE UP (ref 7–18)
CALCIUM SERPL-MCNC: 8.2 MG/DL — LOW (ref 8.4–10.5)
CHLORIDE SERPL-SCNC: 104 MMOL/L — SIGNIFICANT CHANGE UP (ref 96–108)
CO2 SERPL-SCNC: 28 MMOL/L — SIGNIFICANT CHANGE UP (ref 22–31)
CREAT SERPL-MCNC: 0.62 MG/DL — SIGNIFICANT CHANGE UP (ref 0.5–1.3)
EGFR: 102 ML/MIN/1.73M2 — SIGNIFICANT CHANGE UP
GLUCOSE SERPL-MCNC: 119 MG/DL — HIGH (ref 70–99)
HCT VFR BLD CALC: 27.7 % — LOW (ref 39–50)
HGB BLD-MCNC: 9.1 G/DL — LOW (ref 13–17)
MAGNESIUM SERPL-MCNC: 2.3 MG/DL — SIGNIFICANT CHANGE UP (ref 1.6–2.6)
MCHC RBC-ENTMCNC: 32.2 PG — SIGNIFICANT CHANGE UP (ref 27–34)
MCHC RBC-ENTMCNC: 32.9 GM/DL — SIGNIFICANT CHANGE UP (ref 32–36)
MCV RBC AUTO: 97.9 FL — SIGNIFICANT CHANGE UP (ref 80–100)
NRBC # BLD: 0 /100 WBCS — SIGNIFICANT CHANGE UP (ref 0–0)
OB PNL STL: NEGATIVE — SIGNIFICANT CHANGE UP
PHOSPHATE SERPL-MCNC: 3.2 MG/DL — SIGNIFICANT CHANGE UP (ref 2.5–4.5)
PLATELET # BLD AUTO: 76 K/UL — LOW (ref 150–400)
POTASSIUM SERPL-MCNC: 3.5 MMOL/L — SIGNIFICANT CHANGE UP (ref 3.5–5.3)
POTASSIUM SERPL-SCNC: 3.5 MMOL/L — SIGNIFICANT CHANGE UP (ref 3.5–5.3)
PROT SERPL-MCNC: 5.2 G/DL — LOW (ref 6–8.3)
RBC # BLD: 2.83 M/UL — LOW (ref 4.2–5.8)
RBC # FLD: 12.3 % — SIGNIFICANT CHANGE UP (ref 10.3–14.5)
SODIUM SERPL-SCNC: 138 MMOL/L — SIGNIFICANT CHANGE UP (ref 135–145)
WBC # BLD: 1.43 K/UL — LOW (ref 3.8–10.5)
WBC # FLD AUTO: 1.43 K/UL — LOW (ref 3.8–10.5)

## 2022-11-24 PROCEDURE — 99233 SBSQ HOSP IP/OBS HIGH 50: CPT

## 2022-11-24 PROCEDURE — 99497 ADVNCD CARE PLAN 30 MIN: CPT | Mod: 25

## 2022-11-24 RX ORDER — METOCLOPRAMIDE HCL 10 MG
10 TABLET ORAL EVERY 8 HOURS
Refills: 0 | Status: COMPLETED | OUTPATIENT
Start: 2022-11-24 | End: 2022-11-24

## 2022-11-24 RX ORDER — ONDANSETRON 8 MG/1
4 TABLET, FILM COATED ORAL EVERY 6 HOURS
Refills: 0 | Status: DISCONTINUED | OUTPATIENT
Start: 2022-11-24 | End: 2022-11-24

## 2022-11-24 RX ORDER — ONDANSETRON 8 MG/1
4 TABLET, FILM COATED ORAL EVERY 6 HOURS
Refills: 0 | Status: DISCONTINUED | OUTPATIENT
Start: 2022-11-24 | End: 2022-11-30

## 2022-11-24 RX ADMIN — HYDROMORPHONE HYDROCHLORIDE 2 MILLIGRAM(S): 2 INJECTION INTRAMUSCULAR; INTRAVENOUS; SUBCUTANEOUS at 19:01

## 2022-11-24 RX ADMIN — FENTANYL CITRATE 1 PATCH: 50 INJECTION INTRAVENOUS at 08:15

## 2022-11-24 RX ADMIN — AMIODARONE HYDROCHLORIDE 100 MILLIGRAM(S): 400 TABLET ORAL at 06:33

## 2022-11-24 RX ADMIN — FENTANYL CITRATE 1 PATCH: 50 INJECTION INTRAVENOUS at 20:08

## 2022-11-24 RX ADMIN — DABIGATRAN ETEXILATE MESYLATE 150 MILLIGRAM(S): 150 CAPSULE ORAL at 17:38

## 2022-11-24 RX ADMIN — Medication 120 MILLIGRAM(S): at 06:32

## 2022-11-24 RX ADMIN — Medication 10 MILLIGRAM(S): at 21:12

## 2022-11-24 RX ADMIN — Medication 30 MILLILITER(S): at 22:51

## 2022-11-24 RX ADMIN — Medication 10 MILLIGRAM(S): at 16:29

## 2022-11-24 RX ADMIN — Medication 4 MILLIGRAM(S): at 22:51

## 2022-11-24 RX ADMIN — ONDANSETRON 4 MILLIGRAM(S): 8 TABLET, FILM COATED ORAL at 12:20

## 2022-11-24 RX ADMIN — DABIGATRAN ETEXILATE MESYLATE 150 MILLIGRAM(S): 150 CAPSULE ORAL at 06:32

## 2022-11-24 RX ADMIN — ONDANSETRON 8 MILLIGRAM(S): 8 TABLET, FILM COATED ORAL at 06:34

## 2022-11-24 RX ADMIN — PANTOPRAZOLE SODIUM 40 MILLIGRAM(S): 20 TABLET, DELAYED RELEASE ORAL at 06:33

## 2022-11-24 RX ADMIN — ATORVASTATIN CALCIUM 20 MILLIGRAM(S): 80 TABLET, FILM COATED ORAL at 22:51

## 2022-11-24 RX ADMIN — LOSARTAN POTASSIUM 100 MILLIGRAM(S): 100 TABLET, FILM COATED ORAL at 06:32

## 2022-11-24 RX ADMIN — HYDROMORPHONE HYDROCHLORIDE 2 MILLIGRAM(S): 2 INJECTION INTRAMUSCULAR; INTRAVENOUS; SUBCUTANEOUS at 20:35

## 2022-11-24 NOTE — PHYSICAL THERAPY INITIAL EVALUATION ADULT - ADDITIONAL COMMENTS
Patient lives on 2nd floor of a private house with stairs and rails present. Patient independent with all ADL's and ambulates without any mobility device.

## 2022-11-24 NOTE — PROGRESS NOTE ADULT - CONVERSATION DETAILS
Met patient at bedside and communicated with his wife twice,  over-the-phone, while in his room and later in the afternoon. Patient with a terminal appearance, but A&Ox3 and looking comfortable. Wife expressed that she is having a hard time to accept the diagnosis and disease course. She cried in the afternoon and was upset over "the [employer]" which she believes "is one of the reasons there are no cure for cancer". Wife asked questions about the visitation and was informed that she could visit however there is a great risk involved due to COVID-19 and she should evaluate the risk and benefits associated with her comorbidities, which she should highly balance. COVID-19 precautions and protocol are in place. Verbalized understanding of same. Emotional support provided.      Extensive GOC conversation with patient this morning. Explained in length the risks and benefits of cardiopulmonary resuscitative measures including CPR, shock, pressors and invasive ventilation relating to artificial life support. Also explained the difference between artificial life prolonging measures (including artificial nutrition) to extend life and the burden/suffering/complications vs natural death and supportive/conservative interventions to maximize quality and quantity of life without causing significant burden and suffering to the patient and caregivers.     Patient finalized Advanced Directives and endorsed the following orders: DNR. Limited medical interventions; DNI; Send to the hospital, if necessary, based on MOLST orders; no feeding tube; use antibiotics    Case discussed with Hospice Attending, Ashely Farfan DO and multiple communication made with the Nursing Staff Met patient at bedside and communicated with his wife twice, over-the-phone, while in his room and later in the afternoon. Patient with a terminal appearance, but A&Ox3 and looking comfortable. Wife expressed that she is having a hard time to accept the diagnosis and disease course. She cried in the afternoon and was upset over "the [employer]" which she believes "is one of the reasons there are no cure for cancer". Wife asked questions about the visitation and was informed that she could visit however there is a great risk involved due to COVID-19 and she should evaluate the risk and benefits associated with her comorbidities, which she should highly balance. COVID-19 precautions and protocol are in place. Verbalized understanding of same. Emotional support provided.      Extensive GOC conversation with patient this morning. Explained in length the risks and benefits of cardiopulmonary resuscitative measures including CPR, shock, pressors and invasive ventilation relating to artificial life support. Also explained the difference between artificial life prolonging measures (including artificial nutrition) to extend life and the burden/suffering/complications vs natural death and supportive/conservative interventions to maximize quality and quantity of life without causing significant burden and suffering to the patient and caregivers.     Patient finalized Advanced Directives and endorsed the following orders: DNR. Limited medical interventions; DNI; Send to the hospital, if necessary, based on MOLST orders; no feeding tube; use antibiotics    Case discussed with Hospice Attending, Ashely Farfan DO and multiple communication made with the Nursing Staff

## 2022-11-24 NOTE — PROGRESS NOTE ADULT - PROBLEM SELECTOR PLAN 3
associated with occasional SOB, significant debility, and some hypotension today    Recommend:  C/w Lactated ringers. 1000 milliLiter(s) (100 mL/Hr) IV Continuous   C/w Oxygen Delivery Therapy: Follow Oxygen titration guidelines  > Elevate head of the bed 45-60 degrees  Fall precautions  OOB to chair  C/w PT services while in the hospital and follow recommendations

## 2022-11-24 NOTE — PROGRESS NOTE ADULT - ASSESSMENT
72 year old male with stage 4 pancreatic ca started chemo at [facility].  he presented with N/V.  He also has significant pain at upper abdomen.  No fever or chills    1. N/V, probably due to chemo  he had ondansetron, IVF, one dose decadron  the nausea is better but has no appetite  will start marinol, which can help for nausea  pall care eval appreciated    2. abdominal pain from pancreatic ca  will increase fentanyl patch to 50  he can have dilaudid 2 mg q4h prn  f/u pall care    3 pancreatic ca  f/u at [facility]    4. pancytopenia -- due to chemo, wbc improved, hgb and plts lower but still adequate  -- monitor    5. covid -- monitor clinically    will follow.

## 2022-11-24 NOTE — PHYSICAL THERAPY INITIAL EVALUATION ADULT - RANGE OF MOTION EXAMINATION, REHAB EVAL
You may receive a survey in the mail regarding todays appointment. We would love to hear from you and appreciate your feedback if you could take the time to fill it out!    Decrease ursodiol to 250mg mg by mouth 3 times daily    Repeat labs 6/2018    Decrease alcohol use    Follow up August 2018  
no ROM deficits were identified

## 2022-11-24 NOTE — PROGRESS NOTE ADULT - PROBLEM SELECTOR PLAN 1
associated with pancreatic cancer, constipation, dyspepsia, and hiccups    Recommend:  C/w Lactated ringers. 1000 milliLiter(s) (100 mL/Hr) IV Continuous   C/w Metoclopramide 10 mg IVP every 8 hours for N & V  C/w PRN Ondansetron Injectable 8 milliGRAM(s) IV Push every 6 hours PRN Nausea and/or Vomiting  C/w Pantoprazole Tablet 40 milliGRAM(s) Oral before breakfast => Consider IV if persistent N &   C/w Aluminum hydroxide/magnesium hydroxide/simethicone Suspension 30 milliLiter(s) Oral every 4 hours PRN Dyspepsia  C/w Senna 1 Tablet(s) Oral at bedtime

## 2022-11-24 NOTE — PHYSICAL THERAPY INITIAL EVALUATION ADULT - PERTINENT HX OF CURRENT PROBLEM, REHAB EVAL
72 male PMHx HTN, Afib (on pradaxa), and recently dx stage IV pancreatic cancer (inoperable, on chemo) presents with intractable nausea and vomiting for 2 days. Patient was diagnosed with pancreatic cancer at [facility] during a hospital admission for CVA. Pt was recently started on chemotherapy and received his last session this past friday 11/18. Since then patient has had severe nausea and multiple episodes of NBNB vomiting unrelieved by his compazine or prochlorperazine which he was prescribed by his oncologist. Patient has also had loss of appetite unrelated to his nausea and epigastric pain. Patient endorses dizziness as well and had a presyncopal episode this morning, denies head trauma or loss of consciousness Patient denies fever chills, headache, chest pain, palpitations, cough, SOB, diarrhea/constipation, dysuria or leg swelling. NKDA. Patient follows Dr. Lulú Garcia at [facility]

## 2022-11-24 NOTE — PHYSICAL THERAPY INITIAL EVALUATION ADULT - GENERAL OBSERVATIONS, REHAB EVAL
Patient was seen for PT evaluation today. EMR, laboratory and radiology results reviewed. Pt received supine in 'Covid-Isolation room' bed, NAD

## 2022-11-24 NOTE — PROGRESS NOTE ADULT - PROBLEM SELECTOR PLAN 2
Patient is A&Ox3 and comfortable with current pain management    Recommend:  C/w FentaNYL Patch  50 MICROgram(s)/Hr 1 Patch Transdermal every 72 hours => Make sure the Fentanyl Patch is placed on fat tissue (above the navel or posterior aspect of upper arm)  C/w PRN: HYDROmorphone Tablet 2 milliGRAM(s) Oral every 4 hours PRN Severe Pain (7 - 10)

## 2022-11-24 NOTE — PROGRESS NOTE ADULT - PROBLEM SELECTOR PLAN 5
Palliative/Hospice education and counseling to family/caregivers (See conversation above)    Endorsed: DNR/DNI    Recommendations:   C/w IPU Level of Care   C/w MEWS order   C/w Presence of Indwelling Urethral Catheter   C/w No Pharmacologic VTE Prophylaxis Patient with recently Dx with advanced pancreatic cancer, [place] [facility] [place] Lulú Garcia MD, was on active chemo (11/18/2022)    Endorsed: DNR/DNI (See conversation above)    Recommendation:  C/w current medical treatments, goal for patient to recover clinically  Follow HemOnc Recommendations  Supportive care    Palliative Care will follow

## 2022-11-24 NOTE — PROGRESS NOTE ADULT - TREATMENT GUIDELINE COMMENT
Continue IPU Level of Care continue current management and cancer treatments at [facility]

## 2022-11-24 NOTE — PROGRESS NOTE ADULT - ASSESSMENT
_________________________________________________________________________________________  ========>>  M E D I C A L   A T T E N D I N G    F O L L O W  U P  N O T E  <<=========  -----------------------------------------------------------------------------------------------------    - Patient seen and examined by me earlier today. (covering today)   - In summary,  ALYSIA REEDER is a 72y year old man admitted with vomiting  - Patient today overall doing ok, comfortable, not tolerating much PO , vomited once tis morning.. asking for Persian ices..     ==================>> REVIEW OF SYSTEM <<=================    GEN: no fever, no chills, no pain  RESP: no SOB, no cough, no sputum  CVS: no chest pain, no palpitations  GI: no abdominal pain, + nausea > receiving zofran per RN   : no dysuria, no frequency  Neuro: no headache, no dizziness  Derm : no itching, no rash    ==================>> PHYSICAL EXAM <<=================    GEN: A&O X 3 , NAD , comfortable, pleasant, calm   HEENT: NCAT, PERRL, MMM, hearing intact  Neck: supple , no JVD appreciated  CVS: S1S2 , regular , No M/R/G appreciated  PULM: CTA B/L,  no W/R/R appreciated  ABD.: soft. non tender, non distended,  bowel sounds present  Extrem: intact pulses , no edema   PSYCH : normal mood,  not anxious                            ( Note Written / Date of service :  22 )    ==================>> MEDICATIONS <<====================    MEDICATIONS  (STANDING):  aMIOdarone    Tablet 100 milliGRAM(s) Oral daily  atorvastatin 20 milliGRAM(s) Oral at bedtime  dabigatran 150 milliGRAM(s) Oral every 12 hours  diltiazem    milliGRAM(s) Oral daily  doxazosin 4 milliGRAM(s) Oral at bedtime  fentaNYL   Patch  50 MICROgram(s)/Hr 1 Patch Transdermal every 72 hours  lactated ringers. 1000 milliLiter(s) (100 mL/Hr) IV Continuous <Continuous>  losartan 100 milliGRAM(s) Oral daily  pantoprazole    Tablet 40 milliGRAM(s) Oral before breakfast  senna 1 Tablet(s) Oral at bedtime    MEDICATIONS  (PRN):  acetaminophen     Tablet .. 650 milliGRAM(s) Oral every 6 hours PRN Temp greater or equal to 38C (100.4F), Mild Pain (1 - 3)  aluminum hydroxide/magnesium hydroxide/simethicone Suspension 30 milliLiter(s) Oral every 4 hours PRN Dyspepsia  HYDROmorphone   Tablet 2 milliGRAM(s) Oral every 4 hours PRN Severe Pain (7 - 10)  melatonin 3 milliGRAM(s) Oral at bedtime PRN Insomnia  naloxone 1 mG/mL Injection for Intranasal Use 4 milliGRAM(s) IntraNasal once PRN unresponsiveness  ondansetron Injectable 4 milliGRAM(s) IV Push every 6 hours PRN Nausea and/or Vomiting    ___________  Active diet:  Diet, Consistent Carbohydrate Clear Liquid:   Supplement Feeding Modality:  Oral  Ensure Clear Cans or Servings Per Day:  1       Frequency:  Three Times a day  ___________________    ==================>> VITAL SIGNS <<==================    T(C): 37.2 (22 @ 14:04), Max: 37.2 (22 @ 14:04)  HR: 61 (22 @ 14:04) (61 - 73)  BP: 104/52 (22 @ 14:04) (104/52 - 115/68)  RR: 20 (22 @ 14:04) (20 - 20)  SpO2: 99% (22 @ 14:04) (99% - 100%)      ==================>> LAB AND IMAGING <<==================                        9.1    1.43  )-----------( 76       ( 2022 07:28 )             27.7            138  |  104  |  12  ----------------------------<  119<H>  3.5   |  28  |  0.62    Ca    8.2<L>      2022 07:28  Phos  3.2       Mg     2.3         TPro  5.2<L>  /  Alb  2.6<L>  /  TBili  1.1  /  DBili  x   /  AST  101<H>  /  ALT  314<H>  /  AlkPhos  476<H>      Urinalysis Basic - ( 2022 22:00 )  Color: Michelle / Appearance: Clear / S.010 / pH: x  Gluc: x / Ketone: Trace  / Bili: Negative / Urobili: 8   Blood: x / Protein: 30 mg/dL / Nitrite: Negative   Leuk Esterase: Trace / RBC: 0-2 /HPF / WBC 3-5 /HPF   Sq Epi: x / Non Sq Epi: Few /HPF / Bacteria: Many /HPF    HgA1C:   (22)          (22)      6.3    ____________________________    M I C R O B I O L O G Y :    Culture - Blood (collected 2022 06:29)  Source: .Blood Blood  Preliminary Report (2022 12:01):    No growth to date.    ___________________________________________________________________________________  ===============>>  A S S E S S M E N T   A N D   P L A N <<===============  ------------------------------------------------------------------------------------------    Pt is a 73 y/o man with PMHx HTN, Afib (on pradaxa), and recently dx stage IV pancreatic cancer (inoperable, on chemo) presents with intractable nausea and vomiting for 2 days. Patient was diagnosed with pancreatic cancer at [facility] during a hospital admission for CVA. Pt was recently started on chemotherapy and received his last session this past . Since then patient has had severe nausea and multiple episodes of NBNB vomiting unrelieved by his compazine or prochlorperazine which he was prescribed by his oncologist. Patient has also had loss of appetite unrelated to his nausea and epigastric pain. Patient endorses dizziness as well and had a presyncopal episode     continue antiemetics  supportive care ( diet change to nondiabetic so pt can benefit from Persian ices ( only thing he has been able to tolerate.. )   Continue Current medications otherwise and monitor.    --------------------------------------------  Case discussed with pt, RN  Education given on findings and plan of care  ___________________________  H. ANDRES Dinh (covering today)   Pager: [phone]    Dr Ocasio to follow tomorrow

## 2022-11-24 NOTE — PROGRESS NOTE ADULT - SUBJECTIVE AND OBJECTIVE BOX
follow up on:  complex medical decision making related to goals of care    [facility] Geriatric and Palliative Consult Service:  Dr. Ashely Farfan: cell ([phone])  Dr. Yadi Nagel: cell ([phone])  Lynnette Arciniega DNP: cell ([phone])  Benja Sofia NP: cell ([phone])   Laila Ballard NP: ENZO Novak LMSW: cell ([phone])     OVERNIGHT EVENTS:    Present Symptoms:   Pain:   Fatigue:  Nausea:  Lack of Appetite:   SOB:  Depression:  Anxiety:  Review of Systems: [All others negative or Unable to obtain due to poor mentation]    MEDICATIONS  (STANDING):  aMIOdarone    Tablet 100 milliGRAM(s) Oral daily  atorvastatin 20 milliGRAM(s) Oral at bedtime  dabigatran 150 milliGRAM(s) Oral every 12 hours  diltiazem    milliGRAM(s) Oral daily  doxazosin 4 milliGRAM(s) Oral at bedtime  fentaNYL   Patch  50 MICROgram(s)/Hr 1 Patch Transdermal every 72 hours  lactated ringers. 1000 milliLiter(s) (100 mL/Hr) IV Continuous <Continuous>  losartan 100 milliGRAM(s) Oral daily  pantoprazole    Tablet 40 milliGRAM(s) Oral before breakfast  senna 1 Tablet(s) Oral at bedtime    MEDICATIONS  (PRN):  acetaminophen     Tablet .. 650 milliGRAM(s) Oral every 6 hours PRN Temp greater or equal to 38C (100.4F), Mild Pain (1 - 3)  aluminum hydroxide/magnesium hydroxide/simethicone Suspension 30 milliLiter(s) Oral every 4 hours PRN Dyspepsia  HYDROmorphone   Tablet 2 milliGRAM(s) Oral every 4 hours PRN Severe Pain (7 - 10)  melatonin 3 milliGRAM(s) Oral at bedtime PRN Insomnia  naloxone 1 mG/mL Injection for Intranasal Use 4 milliGRAM(s) IntraNasal once PRN unresponsiveness  ondansetron Injectable 8 milliGRAM(s) IV Push every 6 hours PRN Nausea and/or Vomiting      PHYSICAL EXAM:  Vital Signs Last 24 Hrs  T(C): 36.4 (2022 06:00), Max: 37.1 (2022 13:03)  T(F): 97.6 (2022 06:00), Max: 98.8 (2022 13:03)  HR: 73 (2022 06:00) (67 - 73)  BP: 115/68 (2022 06:00) (113/60 - 116/68)  BP(mean): --  RR: 20 (2022 06:00) (20 - 20)  SpO2: 100% (2022 06:00) (100% - 100%)    Parameters below as of 2022 06:00  Patient On (Oxygen Delivery Method): room air        General: alert  oriented x ____    lethargic distressed cachexia  verbal nonverbal  unarousable     Palliative Performance Scale/Karnofsky Score:  ECOG Performance:    HEENT: no abnormal lesion, dry mouth  ET tube/trach oral lesions:  Lungs: tachypnea/labored breathing, audible excessive secretions  CV: RRR, S1S2, tachycardia  GI: soft non distended non tender  incontinent               PEG/NG/OG tube  constipation  last BM:   : incontinent  oliguria/anuria  foster  Musculoskeletal: weakness x4 edema x4    ambulatory with assistance   mostly/fully bedbound/wheelchair bound  Skin: no abnormal skin lesions, poor skin turgor, pressure ulcers stage:   Neuro: no deficits, mild cognitive impairment dsyphagia/dysarthria paresis  Oral intake ability: unable/only mouth care, minimal moderate full capability    LABS:                          9.1    1.43  )-----------( 76       ( 2022 07:28 )             27.7     11-24    138  |  104  |  12  ----------------------------<  119<H>  3.5   |  28  |  0.62    Ca    8.2<L>      2022 07:28  Phos  3.2     11-24  Mg     2.3     11-24    TPro  5.2<L>  /  Alb  2.6<L>  /  TBili  1.1  /  DBili  x   /  AST  101<H>  /  ALT  314<H>  /  AlkPhos  476<H>  11-24    Urinalysis Basic - ( 2022 22:00 )    Color: Michelle / Appearance: Clear / S.010 / pH: x  Gluc: x / Ketone: Trace  / Bili: Negative / Urobili: 8   Blood: x / Protein: 30 mg/dL / Nitrite: Negative   Leuk Esterase: Trace / RBC: 0-2 /HPF / WBC 3-5 /HPF   Sq Epi: x / Non Sq Epi: Few /HPF / Bacteria: Many /HPF        RADIOLOGY & ADDITIONAL STUDIES: follow up on:  complex medical decision making related to goals of care    [facility] Geriatric and Palliative Consult Service:  Dr. Ashely Farfan: cell ([phone])  Dr. Yadi Nagel: cell ([phone])  Lynnette Arciniega DNP: cell ([phone])  Benja Sofia NP: cell ([phone])   Laila Ballard NP: ENZO Novak LMSW: cell ([phone])     OVERNIGHT EVENTS:    Present Symptoms:   Pain: denies  Fatigue: severe  Nausea: severe  Lack of Appetite: severe  SOB: moderate  Depression: moderate  Anxiety: moderate  Review of Systems: [Unable to obtain due to poor mentation]    MEDICATIONS  (STANDING):  aMIOdarone    Tablet 100 milliGRAM(s) Oral daily  atorvastatin 20 milliGRAM(s) Oral at bedtime  dabigatran 150 milliGRAM(s) Oral every 12 hours  diltiazem    milliGRAM(s) Oral daily  doxazosin 4 milliGRAM(s) Oral at bedtime  fentaNYL   Patch  50 MICROgram(s)/Hr 1 Patch Transdermal every 72 hours  lactated ringers. 1000 milliLiter(s) (100 mL/Hr) IV Continuous <Continuous>  losartan 100 milliGRAM(s) Oral daily  pantoprazole    Tablet 40 milliGRAM(s) Oral before breakfast  senna 1 Tablet(s) Oral at bedtime    MEDICATIONS  (PRN):  acetaminophen     Tablet .. 650 milliGRAM(s) Oral every 6 hours PRN Temp greater or equal to 38C (100.4F), Mild Pain (1 - 3)  aluminum hydroxide/magnesium hydroxide/simethicone Suspension 30 milliLiter(s) Oral every 4 hours PRN Dyspepsia  HYDROmorphone   Tablet 2 milliGRAM(s) Oral every 4 hours PRN Severe Pain (7 - 10)  melatonin 3 milliGRAM(s) Oral at bedtime PRN Insomnia  naloxone 1 mG/mL Injection for Intranasal Use 4 milliGRAM(s) IntraNasal once PRN unresponsiveness  ondansetron Injectable 8 milliGRAM(s) IV Push every 6 hours PRN Nausea and/or Vomiting    PHYSICAL EXAM:  Vital Signs Last 24 Hrs  T(C): 36.4 (2022 06:00), Max: 37.1 (2022 13:03)  T(F): 97.6 (2022 06:00), Max: 98.8 (2022 13:03)  HR: 73 (2022 06:00) (67 - 73)  BP: 115/68 (2022 06:00) (113/60 - 116/68)  BP(mean): --  RR: 20 (2022 06:00) (20 - 20)  SpO2: 100% (2022 06:00) (100% - 100%)    Parameters below as of 2022 06:00  Patient On (Oxygen Delivery Method): room air    REVIEW OF SYSTEMS: denies fever, chills, SOB, palpitations, chest pain, abdominal pain, nausea, vomitting, diarrhea, constipation, dizziness    MEDICATIONS  (STANDING):  aMIOdarone    Tablet 100 milliGRAM(s) Oral daily  atorvastatin 20 milliGRAM(s) Oral at bedtime  dabigatran 150 milliGRAM(s) Oral every 12 hours  diltiazem    milliGRAM(s) Oral daily  doxazosin 4 milliGRAM(s) Oral at bedtime  fentaNYL   Patch  50 MICROgram(s)/Hr 1 Patch Transdermal every 72 hours  lactated ringers. 1000 milliLiter(s) (100 mL/Hr) IV Continuous <Continuous>  losartan 100 milliGRAM(s) Oral daily  pantoprazole    Tablet 40 milliGRAM(s) Oral before breakfast  senna 1 Tablet(s) Oral at bedtime    MEDICATIONS  (PRN):  acetaminophen     Tablet .. 650 milliGRAM(s) Oral every 6 hours PRN Temp greater or equal to 38C (100.4F), Mild Pain (1 - 3)  aluminum hydroxide/magnesium hydroxide/simethicone Suspension 30 milliLiter(s) Oral every 4 hours PRN Dyspepsia  HYDROmorphone   Tablet 2 milliGRAM(s) Oral every 4 hours PRN Severe Pain (7 - 10)  melatonin 3 milliGRAM(s) Oral at bedtime PRN Insomnia  naloxone 1 mG/mL Injection for Intranasal Use 4 milliGRAM(s) IntraNasal once PRN unresponsiveness  ondansetron Injectable 8 milliGRAM(s) IV Push every 6 hours PRN Nausea and/or Vomiting    PHYSICAL EXAM:  General: alert  oriented x 3  chronically ill appearing, A/ox3, verbal    Palliative Performance Scale/Karnofsky Score: 40-50%  ECOG Performance: 2/3    HEENT: no abnormal lesion, dry mouth    Lungs: nonlabored in RA  CV: RRR, S1S2  GI: soft, non distended, non tender  : voiding freely  Musculoskeletal: weakness x4 ambulatory with assistance     Skin: warm, dry, no abnormal skin lesions, poor skin turgor  Neuro: A&Oriented X3, good concentration; motor strength 5/5 B/L upper and lower extremities  Oral intake ability: clear liquids    LABS:               9.1    1.43  )-----------( 76       ( 2022 07:28 )             27.7     11-24    138  |  104  |  12  ----------------------------<  119<H>  3.5   |  28  |  0.62    Ca    8.2<L>      2022 07:28  Phos  3.2     11-24  Mg     2.3     11-24    TPro  5.2<L>  /  Alb  2.6<L>  /  TBili  1.1  /  DBili  x   /  AST  101<H>  /  ALT  314<H>  /  AlkPhos  476<H>  11-24    Urinalysis Basic - ( 2022 22:00 )    Color: Michelle / Appearance: Clear / S.010 / pH: x  Gluc: x / Ketone: Trace  / Bili: Negative / Urobili: 8   Blood: x / Protein: 30 mg/dL / Nitrite: Negative   Leuk Esterase: Trace / RBC: 0-2 /HPF / WBC 3-5 /HPF   Sq Epi: x / Non Sq Epi: Few /HPF / Bacteria: Many /HPF      RADIOLOGY & ADDITIONAL STUDIES:  #1.  < from: CT Head No Cont (22 @ 10:01) >  ACC: [id number] EXAM:  CT BRAIN                          PROCEDURE DATE:  2022      INTERPRETATION:  Noncontrast CT of the brain.    CLINICAL INDICATION:  Status post fall on anticoagulation, evaluate for hemorrhage    TECHNIQUE : Axial CT scanning of the brain was obtained from the skull base to the vertex without the administration of intravenous contrast.    COMPARISON: None available    FINDINGS:  No acute hemorrhage, hydrocephalus, midline shift or extra-axial collections identified. Age-appropriate involutional changes and mild microvascular ischemic changes are present.    No displaced calvarial fracture is visualized.    The orbits are not remarkable in appearance.    The visualized paranasal sinuses and tympanomastoid cavities are free of acute disease.    IMPRESSION:    No acute hemorrhage, extra-axial collection or displaced calvarial fracture.    --- End of Report ---    DAKOTAH WHITT MD; Attending Radiologist  This document has been electronically signed. 2022 10:23AM    < end of copied text >    #2.  < from: Xray Chest 1 View- PORTABLE-Urgent (22 @ 14:12) >  ACC: [id number] EXAM:  XR CHEST PORTABLE URGENT 1V                          PROCEDURE DATE:  2022      INTERPRETATION:  CLINICAL STATEMENT: Chest Pain.    TECHNIQUE: AP view of the chest.    COMPARISON: None available.    FINDINGS/  IMPRESSION:  Right Mediport noted. No consolidation or pleural effusion    Heart size cannot be accurately assessed in this projection.    --- End of Report ---    RAMIRO PARRISH MD; Attending Radiologist  This document has been electronically signed. 2022  6:11PM    < end of copied text >   follow up on:  complex medical decision making related to goals of care    [facility]:  Dr. Ashely Farfan: cell ([phone])  Dr. Yadi Nagel: cell ([phone])  Lynnette Arciniega DNP: cell ([phone])  Benja Sofia NP: cell ([phone])   Laila Ballard NP: ENZO Novak LMSW: cell ([phone])     OVERNIGHT EVENTS:    Present Symptoms:   Pain: denies  Fatigue: severe  Nausea: severe nausea, improved after vomiting x 1 (moderate)  Lack of Appetite: severe  SOB: moderate  Depression: moderate  Anxiety: moderate    Review of Systems: denies fever, chills, dizziness, headache, SOB, palpitations, chest pain, abdominal pain, diarrhea, constipation     MEDICATIONS  (STANDING):  aMIOdarone    Tablet 100 milliGRAM(s) Oral daily  atorvastatin 20 milliGRAM(s) Oral at bedtime  dabigatran 150 milliGRAM(s) Oral every 12 hours  diltiazem    milliGRAM(s) Oral daily  doxazosin 4 milliGRAM(s) Oral at bedtime  fentaNYL   Patch  50 MICROgram(s)/Hr 1 Patch Transdermal every 72 hours  lactated ringers. 1000 milliLiter(s) (100 mL/Hr) IV Continuous <Continuous>  losartan 100 milliGRAM(s) Oral daily  pantoprazole    Tablet 40 milliGRAM(s) Oral before breakfast  senna 1 Tablet(s) Oral at bedtime    MEDICATIONS  (PRN):  acetaminophen     Tablet .. 650 milliGRAM(s) Oral every 6 hours PRN Temp greater or equal to 38C (100.4F), Mild Pain (1 - 3)  aluminum hydroxide/magnesium hydroxide/simethicone Suspension 30 milliLiter(s) Oral every 4 hours PRN Dyspepsia  HYDROmorphone   Tablet 2 milliGRAM(s) Oral every 4 hours PRN Severe Pain (7 - 10)  melatonin 3 milliGRAM(s) Oral at bedtime PRN Insomnia  naloxone 1 mG/mL Injection for Intranasal Use 4 milliGRAM(s) IntraNasal once PRN unresponsiveness  ondansetron Injectable 8 milliGRAM(s) IV Push every 6 hours PRN Nausea and/or Vomiting    PHYSICAL EXAM:  Vital Signs Last 24 Hrs  T(C): 36.4 (2022 06:00), Max: 37.1 (2022 13:03)  T(F): 97.6 (2022 06:00), Max: 98.8 (2022 13:03)  HR: 73 (2022 06:00) (67 - 73)  BP: 115/68 (2022 06:00) (113/60 - 116/68)  BP(mean): --  RR: 20 (2022 06:00) (20 - 20)  SpO2: 100% (2022 06:00) (100% - 100%)    Parameters below as of 2022 06:00  Patient On (Oxygen Delivery Method): room air    MEDICATIONS  (STANDING):  aMIOdarone    Tablet 100 milliGRAM(s) Oral daily  atorvastatin 20 milliGRAM(s) Oral at bedtime  dabigatran 150 milliGRAM(s) Oral every 12 hours  diltiazem    milliGRAM(s) Oral daily  doxazosin 4 milliGRAM(s) Oral at bedtime  fentaNYL   Patch  50 MICROgram(s)/Hr 1 Patch Transdermal every 72 hours  lactated ringers. 1000 milliLiter(s) (100 mL/Hr) IV Continuous <Continuous>  losartan 100 milliGRAM(s) Oral daily  pantoprazole    Tablet 40 milliGRAM(s) Oral before breakfast  senna 1 Tablet(s) Oral at bedtime    MEDICATIONS  (PRN):  acetaminophen     Tablet .. 650 milliGRAM(s) Oral every 6 hours PRN Temp greater or equal to 38C (100.4F), Mild Pain (1 - 3)  aluminum hydroxide/magnesium hydroxide/simethicone Suspension 30 milliLiter(s) Oral every 4 hours PRN Dyspepsia  HYDROmorphone   Tablet 2 milliGRAM(s) Oral every 4 hours PRN Severe Pain (7 - 10)  melatonin 3 milliGRAM(s) Oral at bedtime PRN Insomnia  naloxone 1 mG/mL Injection for Intranasal Use 4 milliGRAM(s) IntraNasal once PRN unresponsiveness  ondansetron Injectable 8 milliGRAM(s) IV Push every 6 hours PRN Nausea and/or Vomiting    PHYSICAL EXAM:  General: Ill appearance, alert and oriented x 3, verbal    Palliative Performance Scale/Karnofsky Score: 40-50%  ECOG Performance: 2/3    HEENT: no abnormal lesion, dry mouth    Lungs: nonlabored in RA  CV: RRR, S1S2  GI: soft, non distended, non tender  : voiding freely  Musculoskeletal: weakness x4 ambulatory with assistance     Skin: warm, dry, no abnormal skin lesions, poor skin turgor  Neuro: A&Oriented X3, good concentration; motor strength 5/5 B/L upper and lower extremities  Oral intake ability: clear liquids    LABS:               9.1    1.43  )-----------( 76       ( 2022 07:28 )             27.7     11-24    138  |  104  |  12  ----------------------------<  119<H>  3.5   |  28  |  0.62    Ca    8.2<L>      2022 07:28  Phos  3.2       Mg     2.3         TPro  5.2<L>  /  Alb  2.6<L>  /  TBili  1.1  /  DBili  x   /  AST  101<H>  /  ALT  314<H>  /  AlkPhos  476<H>  -24    Urinalysis Basic - ( 2022 22:00 )    Color: Michelle / Appearance: Clear / S.010 / pH: x  Gluc: x / Ketone: Trace  / Bili: Negative / Urobili: 8   Blood: x / Protein: 30 mg/dL / Nitrite: Negative   Leuk Esterase: Trace / RBC: 0-2 /HPF / WBC 3-5 /HPF   Sq Epi: x / Non Sq Epi: Few /HPF / Bacteria: Many /HPF      RADIOLOGY & ADDITIONAL STUDIES:  #1.  < from: CT Head No Cont (22 @ 10:01) >  ACC: [id number] EXAM:  CT BRAIN                          PROCEDURE DATE:  2022      INTERPRETATION:  Noncontrast CT of the brain.    CLINICAL INDICATION:  Status post fall on anticoagulation, evaluate for hemorrhage    TECHNIQUE : Axial CT scanning of the brain was obtained from the skull base to the vertex without the administration of intravenous contrast.    COMPARISON: None available    FINDINGS:  No acute hemorrhage, hydrocephalus, midline shift or extra-axial collections identified. Age-appropriate involutional changes and mild microvascular ischemic changes are present.    No displaced calvarial fracture is visualized.    The orbits are not remarkable in appearance.    The visualized paranasal sinuses and tympanomastoid cavities are free of acute disease.    IMPRESSION:    No acute hemorrhage, extra-axial collection or displaced calvarial fracture.    --- End of Report ---    DAKOTAH WHITT MD; Attending Radiologist  This document has been electronically signed. 2022 10:23AM    < end of copied text >    #2.  < from: Xray Chest 1 View- PORTABLE-Urgent (22 @ 14:12) >  ACC: [id number] EXAM:  XR CHEST PORTABLE URGENT 1V                          PROCEDURE DATE:  2022      INTERPRETATION:  CLINICAL STATEMENT: Chest Pain.    TECHNIQUE: AP view of the chest.    COMPARISON: None available.    FINDINGS/  IMPRESSION:  Right Mediport noted. No consolidation or pleural effusion    Heart size cannot be accurately assessed in this projection.    --- End of Report ---    RAMIRO PARRISH MD; Attending Radiologist  This document has been electronically signed. 2022  6:11PM    < end of copied text >   follow up on:  complex medical decision making related to goals of care    [facility] Geriatric and Palliative Consult Service:  Dr. Ashely Farfan: cell ([phone])  Dr. Yadi Nagel: cell ([phone])  Lynnette Arciniega DNP: cell ([phone])  Benja Sofia NP: cell ([phone])   Laila Ballard NP: ENZO Novak LMSW: cell ([phone])     OVERNIGHT EVENTS:    Present Symptoms:   Pain: denies  Fatigue: severe  Nausea: severe nausea, improved after vomiting x 1 (moderate)  Lack of Appetite: severe  SOB: moderate  Depression: moderate  Anxiety: moderate    Review of Systems: denies fever, chills, dizziness, headache, SOB, palpitations, chest pain, abdominal pain, diarrhea, constipation     MEDICATIONS  (STANDING):  aMIOdarone    Tablet 100 milliGRAM(s) Oral daily  atorvastatin 20 milliGRAM(s) Oral at bedtime  dabigatran 150 milliGRAM(s) Oral every 12 hours  diltiazem    milliGRAM(s) Oral daily  doxazosin 4 milliGRAM(s) Oral at bedtime  fentaNYL   Patch  50 MICROgram(s)/Hr 1 Patch Transdermal every 72 hours  lactated ringers. 1000 milliLiter(s) (100 mL/Hr) IV Continuous <Continuous>  losartan 100 milliGRAM(s) Oral daily  pantoprazole    Tablet 40 milliGRAM(s) Oral before breakfast  senna 1 Tablet(s) Oral at bedtime    MEDICATIONS  (PRN):  acetaminophen     Tablet .. 650 milliGRAM(s) Oral every 6 hours PRN Temp greater or equal to 38C (100.4F), Mild Pain (1 - 3)  aluminum hydroxide/magnesium hydroxide/simethicone Suspension 30 milliLiter(s) Oral every 4 hours PRN Dyspepsia  HYDROmorphone   Tablet 2 milliGRAM(s) Oral every 4 hours PRN Severe Pain (7 - 10)  melatonin 3 milliGRAM(s) Oral at bedtime PRN Insomnia  naloxone 1 mG/mL Injection for Intranasal Use 4 milliGRAM(s) IntraNasal once PRN unresponsiveness  ondansetron Injectable 8 milliGRAM(s) IV Push every 6 hours PRN Nausea and/or Vomiting    PHYSICAL EXAM:  Vital Signs Last 24 Hrs  T(C): 36.4 (2022 06:00), Max: 37.1 (2022 13:03)  T(F): 97.6 (2022 06:00), Max: 98.8 (2022 13:03)  HR: 73 (2022 06:00) (67 - 73)  BP: 115/68 (2022 06:00) (113/60 - 116/68)  BP(mean): --  RR: 20 (2022 06:00) (20 - 20)  SpO2: 100% (2022 06:00) (100% - 100%)    Parameters below as of 2022 06:00  Patient On (Oxygen Delivery Method): room air    MEDICATIONS  (STANDING):  aMIOdarone    Tablet 100 milliGRAM(s) Oral daily  atorvastatin 20 milliGRAM(s) Oral at bedtime  dabigatran 150 milliGRAM(s) Oral every 12 hours  diltiazem    milliGRAM(s) Oral daily  doxazosin 4 milliGRAM(s) Oral at bedtime  fentaNYL   Patch  50 MICROgram(s)/Hr 1 Patch Transdermal every 72 hours  lactated ringers. 1000 milliLiter(s) (100 mL/Hr) IV Continuous <Continuous>  losartan 100 milliGRAM(s) Oral daily  pantoprazole    Tablet 40 milliGRAM(s) Oral before breakfast  senna 1 Tablet(s) Oral at bedtime    MEDICATIONS  (PRN):  acetaminophen     Tablet .. 650 milliGRAM(s) Oral every 6 hours PRN Temp greater or equal to 38C (100.4F), Mild Pain (1 - 3)  aluminum hydroxide/magnesium hydroxide/simethicone Suspension 30 milliLiter(s) Oral every 4 hours PRN Dyspepsia  HYDROmorphone   Tablet 2 milliGRAM(s) Oral every 4 hours PRN Severe Pain (7 - 10)  melatonin 3 milliGRAM(s) Oral at bedtime PRN Insomnia  naloxone 1 mG/mL Injection for Intranasal Use 4 milliGRAM(s) IntraNasal once PRN unresponsiveness  ondansetron Injectable 8 milliGRAM(s) IV Push every 6 hours PRN Nausea and/or Vomiting    PHYSICAL EXAM:  General: Ill appearance, alert and oriented x 3, verbal    Palliative Performance Scale/Karnofsky Score: 40-50%  ECOG Performance: 2/3    HEENT: no abnormal lesion, dry mouth    Lungs: nonlabored in RA  CV: RRR, S1S2  GI: soft, non distended, non tender  : voiding freely  Musculoskeletal: weakness, ambulatory with assistance     Skin: warm, dry, poor skin turgor  Neuro: A &Oriented X3, motor strength 5/5 B/L upper and lower extremities  Oral intake ability: clear liquids  Psych: Calm, cooperative, good concentration, CAM negative    LABS:               9.1    1.43  )-----------( 76       ( 2022 07:28 )             27.7     11-24    138  |  104  |  12  ----------------------------<  119<H>  3.5   |  28  |  0.62    Ca    8.2<L>      2022 07:28  Phos  3.2     11-  Mg     2.3     -24    TPro  5.2<L>  /  Alb  2.6<L>  /  TBili  1.1  /  DBili  x   /  AST  101<H>  /  ALT  314<H>  /  AlkPhos  476<H>  -24    Urinalysis Basic - ( 2022 22:00 )    Color: Michelle / Appearance: Clear / S.010 / pH: x  Gluc: x / Ketone: Trace  / Bili: Negative / Urobili: 8   Blood: x / Protein: 30 mg/dL / Nitrite: Negative   Leuk Esterase: Trace / RBC: 0-2 /HPF / WBC 3-5 /HPF   Sq Epi: x / Non Sq Epi: Few /HPF / Bacteria: Many /HPF      RADIOLOGY & ADDITIONAL STUDIES:  #1.  < from: CT Head No Cont (22 @ 10:01) >  ACC: [id number] EXAM:  CT BRAIN                          PROCEDURE DATE:  2022      INTERPRETATION:  Noncontrast CT of the brain.    CLINICAL INDICATION:  Status post fall on anticoagulation, evaluate for hemorrhage    TECHNIQUE : Axial CT scanning of the brain was obtained from the skull base to the vertex without the administration of intravenous contrast.    COMPARISON: None available    FINDINGS:  No acute hemorrhage, hydrocephalus, midline shift or extra-axial collections identified. Age-appropriate involutional changes and mild microvascular ischemic changes are present.    No displaced calvarial fracture is visualized.    The orbits are not remarkable in appearance.    The visualized paranasal sinuses and tympanomastoid cavities are free of acute disease.    IMPRESSION:    No acute hemorrhage, extra-axial collection or displaced calvarial fracture.    --- End of Report ---    DAKOTAH WHITT MD; Attending Radiologist  This document has been electronically signed. 2022 10:23AM    < end of copied text >    #2.  < from: Xray Chest 1 View- PORTABLE-Urgent (22 @ 14:12) >  ACC: [id number] EXAM:  XR CHEST PORTABLE URGENT 1V                          PROCEDURE DATE:  2022      INTERPRETATION:  CLINICAL STATEMENT: Chest Pain.    TECHNIQUE: AP view of the chest.    COMPARISON: None available.    FINDINGS/  IMPRESSION:  Right Mediport noted. No consolidation or pleural effusion    Heart size cannot be accurately assessed in this projection.    --- End of Report ---    RAMIRO PARRISH MD; Attending Radiologist  This document has been electronically signed. 2022  6:11PM    < end of copied text >

## 2022-11-24 NOTE — PHYSICAL THERAPY INITIAL EVALUATION ADULT - NSACTIVITYREC_GEN_A_PT
Promote mobility or out of bed to chair as an interdisciplinary plan of care for patient to prevent weakness, deconditioning, and functional decline. It is not necessary for PT to be present to promote this interdisciplinary plan of care. Utilize appropriate safe patient handling equipment. Physiotherapy will continue with mobility and functional training for patient while admitted.

## 2022-11-24 NOTE — PROGRESS NOTE ADULT - PROBLEM SELECTOR PLAN 4
Clinical evidence indicates that the patient has Moderate protein calorie malnutrition/ 2nd degree. Serum Albumin= 2.6 (11/24/2022)  In context of Chronic Illness (>1 month)  Energy/Food intake <75% of estimated energy requirement >7 days  Weight loss: Mild  (lbs lost recently)  Body Fat loss: Mild    Muscle mass loss: Mild   Fluid Accumulation: Mild    Strength: weakened Mild     Recommend:   C/w Diet: Clear Liquid   > Elevate head of the bed 45-60 degrees  >Aspiration precautions during feeding

## 2022-11-24 NOTE — PROGRESS NOTE ADULT - SUBJECTIVE AND OBJECTIVE BOX
chart reviewed, no events    MEDICATIONS  (STANDING):  aMIOdarone    Tablet 100 milliGRAM(s) Oral daily  atorvastatin 20 milliGRAM(s) Oral at bedtime  dabigatran 150 milliGRAM(s) Oral every 12 hours  diltiazem    milliGRAM(s) Oral daily  doxazosin 4 milliGRAM(s) Oral at bedtime  fentaNYL   Patch  50 MICROgram(s)/Hr 1 Patch Transdermal every 72 hours  lactated ringers. 1000 milliLiter(s) (100 mL/Hr) IV Continuous <Continuous>  losartan 100 milliGRAM(s) Oral daily  pantoprazole    Tablet 40 milliGRAM(s) Oral before breakfast  senna 1 Tablet(s) Oral at bedtime    MEDICATIONS  (PRN):  acetaminophen     Tablet .. 650 milliGRAM(s) Oral every 6 hours PRN Temp greater or equal to 38C (100.4F), Mild Pain (1 - 3)  aluminum hydroxide/magnesium hydroxide/simethicone Suspension 30 milliLiter(s) Oral every 4 hours PRN Dyspepsia  HYDROmorphone   Tablet 2 milliGRAM(s) Oral every 4 hours PRN Severe Pain (7 - 10)  melatonin 3 milliGRAM(s) Oral at bedtime PRN Insomnia  naloxone 1 mG/mL Injection for Intranasal Use 4 milliGRAM(s) IntraNasal once PRN unresponsiveness  ondansetron Injectable 4 milliGRAM(s) IV Push every 6 hours PRN Nausea and/or Vomiting      ROS  no pain, no sob/cp, limited 2/2 covid    Vital Signs Last 24 Hrs  T(C): 36.4 (24 Nov 2022 06:00), Max: 37.1 (23 Nov 2022 13:03)  T(F): 97.6 (24 Nov 2022 06:00), Max: 98.8 (23 Nov 2022 13:03)  HR: 73 (24 Nov 2022 06:00) (67 - 73)  BP: 115/68 (24 Nov 2022 06:00) (113/60 - 116/68)  BP(mean): --  RR: 20 (24 Nov 2022 06:00) (20 - 20)  SpO2: 100% (24 Nov 2022 06:00) (100% - 100%)    Parameters below as of 24 Nov 2022 06:00  Patient On (Oxygen Delivery Method): room air                              9.1    1.43  )-----------( 76       ( 24 Nov 2022 07:28 )             27.7       11-24    138  |  104  |  12  ----------------------------<  119<H>  3.5   |  28  |  0.62    Ca    8.2<L>      24 Nov 2022 07:28  Phos  3.2     11-24  Mg     2.3     11-24    TPro  5.2<L>  /  Alb  2.6<L>  /  TBili  1.1  /  DBili  x   /  AST  101<H>  /  ALT  314<H>  /  AlkPhos  476<H>  11-24

## 2022-11-25 DIAGNOSIS — E43 UNSPECIFIED SEVERE PROTEIN-CALORIE MALNUTRITION: ICD-10-CM

## 2022-11-25 LAB
ALBUMIN SERPL ELPH-MCNC: 2.7 G/DL — LOW (ref 3.5–5)
ALP SERPL-CCNC: 443 U/L — HIGH (ref 40–120)
ALT FLD-CCNC: 290 U/L DA — HIGH (ref 10–60)
ANION GAP SERPL CALC-SCNC: 5 MMOL/L — SIGNIFICANT CHANGE UP (ref 5–17)
AST SERPL-CCNC: 72 U/L — HIGH (ref 10–40)
BILIRUB SERPL-MCNC: 1.2 MG/DL — SIGNIFICANT CHANGE UP (ref 0.2–1.2)
BUN SERPL-MCNC: 13 MG/DL — SIGNIFICANT CHANGE UP (ref 7–18)
CALCIUM SERPL-MCNC: 8.7 MG/DL — SIGNIFICANT CHANGE UP (ref 8.4–10.5)
CHLORIDE SERPL-SCNC: 103 MMOL/L — SIGNIFICANT CHANGE UP (ref 96–108)
CO2 SERPL-SCNC: 29 MMOL/L — SIGNIFICANT CHANGE UP (ref 22–31)
CREAT SERPL-MCNC: 0.63 MG/DL — SIGNIFICANT CHANGE UP (ref 0.5–1.3)
EGFR: 101 ML/MIN/1.73M2 — SIGNIFICANT CHANGE UP
FERRITIN SERPL-MCNC: 869 NG/ML — HIGH (ref 30–400)
FOLATE SERPL-MCNC: 18.2 NG/ML — SIGNIFICANT CHANGE UP
GLUCOSE SERPL-MCNC: 114 MG/DL — HIGH (ref 70–99)
HCT VFR BLD CALC: 28.1 % — LOW (ref 39–50)
HGB BLD-MCNC: 9.3 G/DL — LOW (ref 13–17)
IRON SATN MFR SERPL: 17 % — LOW (ref 20–55)
IRON SATN MFR SERPL: 21 UG/DL — LOW (ref 65–170)
MCHC RBC-ENTMCNC: 32.5 PG — SIGNIFICANT CHANGE UP (ref 27–34)
MCHC RBC-ENTMCNC: 33.1 GM/DL — SIGNIFICANT CHANGE UP (ref 32–36)
MCV RBC AUTO: 98.3 FL — SIGNIFICANT CHANGE UP (ref 80–100)
NRBC # BLD: 0 /100 WBCS — SIGNIFICANT CHANGE UP (ref 0–0)
PLATELET # BLD AUTO: 64 K/UL — LOW (ref 150–400)
POTASSIUM SERPL-MCNC: 3.6 MMOL/L — SIGNIFICANT CHANGE UP (ref 3.5–5.3)
POTASSIUM SERPL-SCNC: 3.6 MMOL/L — SIGNIFICANT CHANGE UP (ref 3.5–5.3)
PROT SERPL-MCNC: 5.2 G/DL — LOW (ref 6–8.3)
RBC # BLD: 2.86 M/UL — LOW (ref 4.2–5.8)
RBC # FLD: 12.4 % — SIGNIFICANT CHANGE UP (ref 10.3–14.5)
SODIUM SERPL-SCNC: 137 MMOL/L — SIGNIFICANT CHANGE UP (ref 135–145)
TIBC SERPL-MCNC: 124 UG/DL — LOW (ref 250–450)
UIBC SERPL-MCNC: 103 UG/DL — LOW (ref 110–370)
VIT B12 SERPL-MCNC: 1505 PG/ML — HIGH (ref 232–1245)
WBC # BLD: 2.36 K/UL — LOW (ref 3.8–10.5)
WBC # FLD AUTO: 2.36 K/UL — LOW (ref 3.8–10.5)

## 2022-11-25 PROCEDURE — 99233 SBSQ HOSP IP/OBS HIGH 50: CPT

## 2022-11-25 PROCEDURE — 99497 ADVNCD CARE PLAN 30 MIN: CPT | Mod: 25

## 2022-11-25 RX ORDER — SODIUM CHLORIDE 9 MG/ML
1000 INJECTION, SOLUTION INTRAVENOUS
Refills: 0 | Status: DISCONTINUED | OUTPATIENT
Start: 2022-11-25 | End: 2022-11-30

## 2022-11-25 RX ORDER — DEXAMETHASONE 0.5 MG/5ML
4 ELIXIR ORAL DAILY
Refills: 0 | Status: COMPLETED | OUTPATIENT
Start: 2022-11-25 | End: 2022-11-28

## 2022-11-25 RX ORDER — DRONABINOL 2.5 MG
2.5 CAPSULE ORAL THREE TIMES A DAY
Refills: 0 | Status: DISCONTINUED | OUTPATIENT
Start: 2022-11-25 | End: 2022-11-30

## 2022-11-25 RX ORDER — METOCLOPRAMIDE HCL 10 MG
10 TABLET ORAL EVERY 8 HOURS
Refills: 0 | Status: DISCONTINUED | OUTPATIENT
Start: 2022-11-25 | End: 2022-11-30

## 2022-11-25 RX ORDER — SODIUM CHLORIDE 9 MG/ML
1000 INJECTION INTRAMUSCULAR; INTRAVENOUS; SUBCUTANEOUS ONCE
Refills: 0 | Status: DISCONTINUED | OUTPATIENT
Start: 2022-11-25 | End: 2022-11-25

## 2022-11-25 RX ADMIN — FENTANYL CITRATE 1 PATCH: 50 INJECTION INTRAVENOUS at 07:38

## 2022-11-25 RX ADMIN — AMIODARONE HYDROCHLORIDE 100 MILLIGRAM(S): 400 TABLET ORAL at 06:46

## 2022-11-25 RX ADMIN — ONDANSETRON 4 MILLIGRAM(S): 8 TABLET, FILM COATED ORAL at 03:08

## 2022-11-25 RX ADMIN — DABIGATRAN ETEXILATE MESYLATE 150 MILLIGRAM(S): 150 CAPSULE ORAL at 17:31

## 2022-11-25 RX ADMIN — ONDANSETRON 4 MILLIGRAM(S): 8 TABLET, FILM COATED ORAL at 11:27

## 2022-11-25 RX ADMIN — FENTANYL CITRATE 1 PATCH: 50 INJECTION INTRAVENOUS at 19:45

## 2022-11-25 RX ADMIN — Medication 120 MILLIGRAM(S): at 06:45

## 2022-11-25 RX ADMIN — SODIUM CHLORIDE 100 MILLILITER(S): 9 INJECTION, SOLUTION INTRAVENOUS at 21:27

## 2022-11-25 RX ADMIN — Medication 2.5 MILLIGRAM(S): at 13:16

## 2022-11-25 RX ADMIN — LOSARTAN POTASSIUM 100 MILLIGRAM(S): 100 TABLET, FILM COATED ORAL at 06:45

## 2022-11-25 RX ADMIN — ONDANSETRON 4 MILLIGRAM(S): 8 TABLET, FILM COATED ORAL at 17:30

## 2022-11-25 RX ADMIN — ATORVASTATIN CALCIUM 20 MILLIGRAM(S): 80 TABLET, FILM COATED ORAL at 21:24

## 2022-11-25 RX ADMIN — Medication 10 MILLIGRAM(S): at 15:11

## 2022-11-25 RX ADMIN — Medication 4 MILLIGRAM(S): at 21:24

## 2022-11-25 RX ADMIN — DABIGATRAN ETEXILATE MESYLATE 150 MILLIGRAM(S): 150 CAPSULE ORAL at 06:45

## 2022-11-25 RX ADMIN — Medication 10 MILLIGRAM(S): at 06:46

## 2022-11-25 RX ADMIN — Medication 2.5 MILLIGRAM(S): at 22:00

## 2022-11-25 RX ADMIN — SODIUM CHLORIDE 100 MILLILITER(S): 9 INJECTION, SOLUTION INTRAVENOUS at 11:11

## 2022-11-25 RX ADMIN — PANTOPRAZOLE SODIUM 40 MILLIGRAM(S): 20 TABLET, DELAYED RELEASE ORAL at 06:45

## 2022-11-25 NOTE — PROGRESS NOTE ADULT - SUBJECTIVE AND OBJECTIVE BOX
_INTERVAL HPI/OVERNIGHT EVENTS:  Patient seen,events noticed,no acute issues  VITAL SIGNS:  T(F): 98.2 (11-25-22 @ 05:51)  HR: 64 (11-25-22 @ 05:51)  BP: 124/55 (11-25-22 @ 05:51)  RR: 18 (11-25-22 @ 05:51)  SpO2: 98% (11-25-22 @ 05:51)  Wt(kg): --    PHYSICAL EXAM:  awake  Constitutional:  Eyes:  ENMT:perrla  Neck:  Respiratory:clear  Cardiovascular:s1s2,m-none  Gastrointestinal:soft,bs pos  Extremities:  Vascular:  Neurological:no focal deficit  Musculoskeletal:    MEDICATIONS  (STANDING):  aMIOdarone    Tablet 100 milliGRAM(s) Oral daily  atorvastatin 20 milliGRAM(s) Oral at bedtime  dabigatran 150 milliGRAM(s) Oral every 12 hours  dexAMETHasone     Tablet 4 milliGRAM(s) Oral daily  diltiazem    milliGRAM(s) Oral daily  doxazosin 4 milliGRAM(s) Oral at bedtime  dronabinol 2.5 milliGRAM(s) Oral three times a day  fentaNYL   Patch  50 MICROgram(s)/Hr 1 Patch Transdermal every 72 hours  lactated ringers. 1000 milliLiter(s) (100 mL/Hr) IV Continuous <Continuous>  lactated ringers. 1000 milliLiter(s) (100 mL/Hr) IV Continuous <Continuous>  losartan 100 milliGRAM(s) Oral daily  pantoprazole    Tablet 40 milliGRAM(s) Oral before breakfast  senna 1 Tablet(s) Oral at bedtime    MEDICATIONS  (PRN):  acetaminophen     Tablet .. 650 milliGRAM(s) Oral every 6 hours PRN Temp greater or equal to 38C (100.4F), Mild Pain (1 - 3)  aluminum hydroxide/magnesium hydroxide/simethicone Suspension 30 milliLiter(s) Oral every 4 hours PRN Dyspepsia  HYDROmorphone   Tablet 2 milliGRAM(s) Oral every 4 hours PRN Severe Pain (7 - 10)  melatonin 3 milliGRAM(s) Oral at bedtime PRN Insomnia  metoclopramide Injectable 10 milliGRAM(s) IV Push every 8 hours PRN vomiting  naloxone 1 mG/mL Injection for Intranasal Use 4 milliGRAM(s) IntraNasal once PRN unresponsiveness  ondansetron Injectable 4 milliGRAM(s) IV Push every 6 hours PRN Nausea and/or Vomiting      Allergies    No Known Allergies    Intolerances        LABS:                        9.3    2.36  )-----------( 64       ( 25 Nov 2022 07:54 )             28.1     11-25    137  |  103  |  13  ----------------------------<  114<H>  3.6   |  29  |  0.63    Ca    8.7      25 Nov 2022 07:54  Phos  3.2     11-24  Mg     2.3     11-24    TPro  5.2<L>  /  Alb  2.7<L>  /  TBili  1.2  /  DBili  x   /  AST  72<H>  /  ALT  290<H>  /  AlkPhos  443<H>  11-25          RADIOLOGY & ADDITIONAL TESTS:    __________________________________________________________________________________  ===============>>  A S S E S S M E N T   A N D   P L A N <<===============  ------------------------------------------------------------------------------------------    Pt is a 73 y/o man with PMHx HTN, Afib (on pradaxa), and recently dx stage IV pancreatic cancer (inoperable, on chemo) presents with intractable nausea and vomiting for 2 days. Patient was diagnosed with pancreatic cancer at [facility] during a hospital admission for CVA. Pt was recently started on chemotherapy and received his last session this past friday 11/18. Since then patient has had severe nausea and multiple episodes of NBNB vomiting unrelieved by his compazine or prochlorperazine which he was prescribed by his oncologist. Patient has also had loss of appetite unrelated to his nausea and epigastric pain. Patient endorses dizziness as well and had a presyncopal episode     continue antiemetics  supportive care ( diet change to nondiabetic so pt can benefit from [place] ices ( only thing he has been able to tolerate.. )   Continue Current medications otherwise and monitor.  covering for dr bazan    --------------------------------------------

## 2022-11-25 NOTE — PROGRESS NOTE ADULT - SUBJECTIVE AND OBJECTIVE BOX
HPI:  72 male PMHx HTN, Afib (on pradaxa), and recently dx stage IV pancreatic cancer (inoperable, on chemo) presents with intractable nausea and vomiting for 2 days. Patient was diagnosed with pancreatic cancer at [facility] during a hospital admission for CVA. Pt was recently started on chemotherapy and received his last session this past friday 11/18. Since then patient has had severe nausea and multiple episodes of NBNB vomiting unrelieved by his compazine or prochlorperazine which he was prescribed by his oncologist. Patient has also had loss of appetite unrelated to his nausea and epigastric pain. Patient endorses dizziness as well and had a presyncopal episode this morning, denies head trauma or loss of consciousness Patient denies fever chills, headache, chest pain, palpitations, cough, SOB, diarrhea/constipation, dysuria or leg swelling. NKDA. Patient follows Dr. Lulú Garcia at [facility] [place] (22 Nov 2022 15:29)     Pt is seen and examined  pt is awake and lying in bed/out of bed to chair  pt seems comfortable and denies any complaints at this time    ROS:  Negative except for:    MEDICATIONS  (STANDING):  aMIOdarone    Tablet 100 milliGRAM(s) Oral daily  atorvastatin 20 milliGRAM(s) Oral at bedtime  dabigatran 150 milliGRAM(s) Oral every 12 hours  diltiazem    milliGRAM(s) Oral daily  doxazosin 4 milliGRAM(s) Oral at bedtime  fentaNYL   Patch  50 MICROgram(s)/Hr 1 Patch Transdermal every 72 hours  lactated ringers. 1000 milliLiter(s) (100 mL/Hr) IV Continuous <Continuous>  losartan 100 milliGRAM(s) Oral daily  pantoprazole    Tablet 40 milliGRAM(s) Oral before breakfast  senna 1 Tablet(s) Oral at bedtime    MEDICATIONS  (PRN):  acetaminophen     Tablet .. 650 milliGRAM(s) Oral every 6 hours PRN Temp greater or equal to 38C (100.4F), Mild Pain (1 - 3)  aluminum hydroxide/magnesium hydroxide/simethicone Suspension 30 milliLiter(s) Oral every 4 hours PRN Dyspepsia  HYDROmorphone   Tablet 2 milliGRAM(s) Oral every 4 hours PRN Severe Pain (7 - 10)  melatonin 3 milliGRAM(s) Oral at bedtime PRN Insomnia  metoclopramide Injectable 10 milliGRAM(s) IV Push every 8 hours PRN vomiting  naloxone 1 mG/mL Injection for Intranasal Use 4 milliGRAM(s) IntraNasal once PRN unresponsiveness  ondansetron Injectable 4 milliGRAM(s) IV Push every 6 hours PRN Nausea and/or Vomiting      Allergies    No Known Allergies    Intolerances        Vital Signs Last 24 Hrs  T(C): 36.8 (25 Nov 2022 05:51), Max: 37.2 (24 Nov 2022 14:04)  T(F): 98.2 (25 Nov 2022 05:51), Max: 98.9 (24 Nov 2022 14:04)  HR: 64 (25 Nov 2022 05:51) (61 - 70)  BP: 124/55 (25 Nov 2022 05:51) (104/52 - 124/55)  BP(mean): --  RR: 18 (25 Nov 2022 05:51) (18 - 20)  SpO2: 98% (25 Nov 2022 05:51) (98% - 100%)    Parameters below as of 25 Nov 2022 05:51  Patient On (Oxygen Delivery Method): room air        PHYSICAL EXAM  General: adult in NAD  HEENT: clear oropharynx, anicteric sclera, pink conjunctiva  Neck: supple  CV: normal S1/S2 with no murmur rubs or gallops  Lungs: positive air movement b/l ant lungs,clear to auscultation, no wheezes, no rales  Abdomen: soft non-tender non-distended, no hepatosplenomegaly  Ext: no clubbing cyanosis or edema  Skin: no rashes and no petechiae  Neuro: alert and oriented X 4, no focal deficits  LABS:                          9.3    2.36  )-----------( 64       ( 25 Nov 2022 07:54 )             28.1         Mean Cell Volume : 98.3 fl  Mean Cell Hemoglobin : 32.5 pg  Mean Cell Hemoglobin Concentration : 33.1 gm/dL  Auto Neutrophil # : x  Auto Lymphocyte # : x  Auto Monocyte # : x  Auto Eosinophil # : x  Auto Basophil # : x  Auto Neutrophil % : x  Auto Lymphocyte % : x  Auto Monocyte % : x  Auto Eosinophil % : x  Auto Basophil % : x    Serial CBC  Hematocrit 28.1  Hemoglobin 9.3  Plat 64  RBC 2.86  WBC 2.36  Serial CBC  Hematocrit 27.7  Hemoglobin 9.1  Plat 76  RBC 2.83  WBC 1.43  Serial CBC  Hematocrit 30.2  Hemoglobin 9.7  Plat 86  RBC 3.09  WBC 1.03  Serial CBC  Hematocrit 32.7  Hemoglobin 10.8  Plat 104  RBC 3.39  WBC 3.09    11-25    137  |  103  |  13  ----------------------------<  114<H>  3.6   |  29  |  0.63    Ca    8.7      25 Nov 2022 07:54  Phos  3.2     11-24  Mg     2.3     11-24    TPro  5.2<L>  /  Alb  2.7<L>  /  TBili  1.2  /  DBili  x   /  AST  72<H>  /  ALT  290<H>  /  AlkPhos  443<H>  11-25          Iron - Total Binding Capacity.: 124 ug/dL (11-25 @ 07:54)            BLOOD SMEAR INTERPRETATION:       RADIOLOGY & ADDITIONAL STUDIES:

## 2022-11-25 NOTE — PROGRESS NOTE ADULT - PROBLEM SELECTOR PLAN 2
Patient with abdominal cancer pain, likely chemo induced, with significant persistent N&V and taking PRN Dilaudid only 1-2 times/ day.    Recommend:  > Consider reducing FentaNYL Patch  25 + 12 MICROgram(s)= 37 mcg/Hr 1 Patch Transdermal every 72 hours   => Make sure the Fentanyl Patches are placed on fat tissue (above the navel OR posterior aspect of upper arm)  C/w PRN: HYDROmorphone Tablet 2 milliGRAM(s) Oral every 4 hours PRN Severe Pain (7 - 10). Patient with abdominal cancer pain, likely chemo induced, with significantly worse persistent N&V and taking PRN Dilaudid only once daily.    Recommend:  > Consider reducing FentaNYL Patch  25 + 12 MICROgram(s)= 37 mcg/Hr 1 Patch Transdermal every 72 hours   => Make sure the Fentanyl Patches are placed on fat tissue (above the navel OR posterior aspect of upper arm)  C/w PRN: HYDROmorphone Tablet 2 milliGRAM(s) Oral every 4 hours PRN Severe Pain (7 - 10).

## 2022-11-25 NOTE — PROGRESS NOTE ADULT - CONVERSATION DETAILS
Met patient at bedside and communicated with his wife, Sandra Gtz ([phone]) over-the-phone in the afternoon. Patient appears ill, A&Ox3 and c/o feeling nausea. States that he is having a hard time to even drink clear Ensure. Wife was sad and cried multiple times yesterday because it was the first Thanksgiving spending w/o her . They have been talking on the phone frequently. Emotional support provided.    Asked Mrs. Gtz about his COVID-19 Hx. States that patient and herself had COVID-19 at end of 8/2022 and beginning of 9/2022. Informed her about his increasing nausea and asked questions about his home use of Dronabinol (Marinol). States that patient was recently [place] [facility] 2.5 mg oral twice daily but he never started it. He was admitted shortly after he received the prescription.      Continues endorsed the following MOLST orders: DNR. Limited medical interventions; DNI; Send to the hospital, if necessary, based on MOLST orders; no feeding tube; use antibiotics.    Offered Chaplaincy referral yesterday which he asked to place it on hold. Asked his wife again today and she requested that I asked the patient again about this on Monday.

## 2022-11-25 NOTE — PROGRESS NOTE ADULT - ASSESSMENT
72M with PMHx HTN, Afib recently dx stage IV pancreatic Ca  p/w nausea and vomiting for 2 days since chemo. Admitted for symptom control and dehydration. on IVF and antiemetic. Heme/onc followed. CTH result unremarkable. Palliative consulted. Pt is now DNR/DNI. Increased pain medication with fentanyl and Dilaudid  per heme/onc. Pt was found to have COVID + and pancytopenic, asymptomatic.   W Plasty Text: The lesion was extirpated to the level of the fat with a #15 scalpel blade.  Given the location of the defect, shape of the defect and the proximity to free margins a W-plasty was deemed most appropriate for repair.  Using a sterile surgical marker, the appropriate transposition arms of the W-plasty were drawn incorporating the defect and placing the expected incisions within the relaxed skin tension lines where possible.    The area thus outlined was incised deep to adipose tissue with a #15 scalpel blade.  The skin margins were undermined to an appropriate distance in all directions utilizing iris scissors.  The opposing transposition arms were then transposed into place in opposite direction and anchored with interrupted buried subcutaneous sutures.

## 2022-11-25 NOTE — PROGRESS NOTE ADULT - PROBLEM SELECTOR PLAN 3
dx last month, inoperable, on active chemo (last 11/18, every friday per pt)  follows at [facility]  c/w home pain control regimen, fentanyl patch and dilaudid PRN  Increased Fentanly 50mcg and Dilaudid 2mg q4h prn per per heme/onc  Palliative following  Heme/Onc Bond following

## 2022-11-25 NOTE — PROGRESS NOTE ADULT - PROBLEM SELECTOR PLAN 2
likely due to cancer  afebrile  UA negative  Blood CX negative  UCX [id number] +enterococcus  asymptomatic  hold off abx for now

## 2022-11-25 NOTE — PROGRESS NOTE ADULT - PROBLEM SELECTOR PLAN 4
associated with persistent N&V and inability to tolerate diet.  Clinical evidence indicates that the patient has severe protein calorie malnutrition/ 3rd degree. Serum Albumin= 2.7 (11/25/2022)    In context of     Acute Illness/Injury (>7days)    vs    Chronic Illness (>1 month)    Energy/Food intake <50% of estimated energy requirement >5 days  Weight loss: Moderate - severe (lbs lost recently)  Body Fat loss: Severe   (temporal wasting, muscle atrophy)  Muscle mass loss: Severe  (Skin failure)   Strength: weakened severe (bedbound)    Recommend:   Aspiration precautions  Nutritional supplements as tolerated  Nutrition consult appreciated as above associated with persistent N&V and inability to tolerate diet.  Clinical evidence indicates that the patient has severe protein calorie malnutrition/ 3rd degree. Serum Albumin= 2.7 (11/25/2022)    In context of Chronic Illness (>1 month)  Energy/Food intake <50% of estimated energy requirement >5 days  Weight loss: Moderate - severe (lbs lost recently)  Body Fat loss: Severe   (temporal wasting, muscle atrophy)  Muscle mass loss: Severe  (Skin failure)   Strength: weakened severe (bedbound)    Recommend:   Aspiration precautions  Nutritional supplements as tolerated  Nutrition consult appreciated as above

## 2022-11-25 NOTE — PROGRESS NOTE ADULT - CONVERSATION/DISCUSSION
Diagnosis/Prognosis/MOLST Discussed/Treatment Options/Holistic
Diagnosis/Prognosis/MOLST Discussed/Treatment Options

## 2022-11-25 NOTE — PROGRESS NOTE ADULT - PROBLEM SELECTOR PLAN 3
associated with occasional SOB, significant debility     Recommend:  C/w Lactated ringers. 1000 milliLiter(s) (100 mL/Hr) IV Continuous   C/w Oxygen Delivery Therapy: Follow Oxygen titration guidelines  > Elevate head of the bed 45-60 degrees  Fall precautions  OOB to chair  C/w PT services while in the hospital and follow recommendations.

## 2022-11-25 NOTE — PROGRESS NOTE ADULT - TREATMENT GUIDELINES
Limited medical interventions/DNR Order/No artificial nutrition/Antibiotic trial/IV fluid trial
DNI/DNR Order/No artificial nutrition/Antibiotic trial/IV fluid trial

## 2022-11-25 NOTE — PROGRESS NOTE ADULT - PROBLEM SELECTOR PLAN 1
Patient with persistent N & V with no improvement with anti-emetics   start dexamethazone and marinol   associated with pancreatic cancer, constipation, dyspepsia, and hiccups    Recommend:  > START Pantoprazole Tablet 40 milliGRAM(s) IV ASAP  > Reduce Dronabinol (Marinol) to 2.5 mg oral every 12 hours, right before meal times => Patient never used this medication at home   C/w DexAMETHasone     Tablet 4 milliGRAM(s) Oral daily  C/w Lactated ringers. 1000 milliLiter(s) (100 mL/Hr) IV Continuous   C/w Metoclopramide 10 mg IVP every 8 hours for N & V  C/w PRN Ondansetron Injectable 4 milliGRAM(s) IV Push every 6 hours PRN Nausea and/or Vomiting  C/w Aluminum hydroxide/magnesium hydroxide/simethicone Suspension 30 milliLiter(s) Oral every 4 hours PRN Dyspepsia  C/w Senna 1 Tablet(s) Oral at bedtime. Patient with persistent N & V with no improvement with anti-emetics   start dexamethazone and marinol   associated with pancreatic cancer, constipation, dyspepsia, and hiccups    Recommend:  > START Pantoprazole Tablet 40 milliGRAM(s) IV ASAP  > Consider reducing Dronabinol (Marinol) to 2.5 mg oral every 12 hours, right before meal times => Patient never used this medication at home   C/w DexAMETHasone     Tablet 4 milliGRAM(s) Oral daily  C/w Lactated ringers. 1000 milliLiter(s) (100 mL/Hr) IV Continuous   C/w Metoclopramide 10 mg IVP every 8 hours for N & V  C/w PRN Ondansetron Injectable 4 milliGRAM(s) IV Push every 6 hours PRN Nausea and/or Vomiting  C/w Aluminum hydroxide/magnesium hydroxide/simethicone Suspension 30 milliLiter(s) Oral every 4 hours PRN Dyspepsia  C/w Senna 1 Tablet(s) Oral at bedtime.

## 2022-11-25 NOTE — PROGRESS NOTE ADULT - NS ATTEND AMEND GEN_ALL_CORE FT
endstage pancreatic cancer - on palliative chemo, ECOG 2-3, patient still wants to pursue treatment being offered at [facility] [place]  has significant symptom burden of N/V, rec meds for management - will continue to monitor and adjust medications  Wife is having a very difficult time, pt declined , will have Palliative SW for additional support.
endstage pancreatic cancer - on palliative chemo, ECOG 2-3, patient still wants to pursue treatment being offered at [facility] [place]  has significant symptom burden of N/V, rec meds for management  patient made himself DNR/DNI  Wife is having a very difficult time, will rec  and Palliative SW for additional support

## 2022-11-25 NOTE — PROGRESS NOTE ADULT - PROBLEM SELECTOR PLAN 1
pt p/w n/v abdominal pain  likely chemo induced  no improvement with zofran   cont reglan   cont IVF  start dexamethazone and mannitol - heme/onc recs today  hem/onc Dr HARDEN  Pt to f/[facility]

## 2022-11-25 NOTE — CHART NOTE - NSCHARTNOTEFT_GEN_A_CORE
Reassessment:     Patient is a 72y old  Male who presents with a chief complaint of Intractable vomiting (25 Nov 2022 13:21)      Factors impacting intake: [ ] none [ ] nausea  [ ] vomiting [ ] diarrhea [ ] constipation  [ ]chewing problems [ ] swallowing issues  [X ] other: Stage IV pancreatic cancer, COVID+    Diet Prescription: Diet, Clear Liquid:   Supplement Feeding Modality:  Oral  Ensure Clear Cans or Servings Per Day:  1       Frequency:  Three Times a day (11-25-22 @ 08:21)    Intake: Patient visited, reports of ongoing poor po intake, taking "sips of juices/tea", still with "nausea", accepted Ensure Clears w/meals, encourage po intake w/meal set up, d/w NP to activate orders placed for Ensure Clear TID on 11/25/22, Palliative care team consulted, rec. least restrictive diet due chronic illness. RD available.    Daily wt. nursing to monitor daily weights   % Weight Change: ?    Pertinent Medications: MEDICATIONS  (STANDING):  aMIOdarone    Tablet 100 milliGRAM(s) Oral daily  atorvastatin 20 milliGRAM(s) Oral at bedtime  dabigatran 150 milliGRAM(s) Oral every 12 hours  dexAMETHasone     Tablet 4 milliGRAM(s) Oral daily  diltiazem    milliGRAM(s) Oral daily  doxazosin 4 milliGRAM(s) Oral at bedtime  dronabinol 2.5 milliGRAM(s) Oral three times a day  fentaNYL   Patch  50 MICROgram(s)/Hr 1 Patch Transdermal every 72 hours  lactated ringers. 1000 milliLiter(s) (100 mL/Hr) IV Continuous <Continuous>  lactated ringers. 1000 milliLiter(s) (100 mL/Hr) IV Continuous <Continuous>  losartan 100 milliGRAM(s) Oral daily  pantoprazole    Tablet 40 milliGRAM(s) Oral before breakfast  senna 1 Tablet(s) Oral at bedtime    MEDICATIONS  (PRN):  acetaminophen     Tablet .. 650 milliGRAM(s) Oral every 6 hours PRN Temp greater or equal to 38C (100.4F), Mild Pain (1 - 3)  aluminum hydroxide/magnesium hydroxide/simethicone Suspension 30 milliLiter(s) Oral every 4 hours PRN Dyspepsia  HYDROmorphone   Tablet 2 milliGRAM(s) Oral every 4 hours PRN Severe Pain (7 - 10)  melatonin 3 milliGRAM(s) Oral at bedtime PRN Insomnia  metoclopramide Injectable 10 milliGRAM(s) IV Push every 8 hours PRN vomiting  naloxone 1 mG/mL Injection for Intranasal Use 4 milliGRAM(s) IntraNasal once PRN unresponsiveness  ondansetron Injectable 4 milliGRAM(s) IV Push every 6 hours PRN Nausea and/or Vomiting    Pertinent Labs: 11-25 Na137 mmol/L Glu 114 mg/dL<H> K+ 3.6 mmol/L Cr  0.63 mg/dL BUN 13 mg/dL 11-24 Phos 3.2 mg/dL 11-25 Alb 2.7 g/dL<L>     CAPILLARY BLOOD GLUCOSE        Skin: Intact    Estimated Needs:   [ X] no change since previous assessment  [ ] recalculated:     Previous Nutrition Diagnosis:   [ ] Inadequate Energy Intake [ ]Inadequate Oral Intake [ ] Excessive Energy Intake   [ ] Underweight [ ] Increased Nutrient Needs [ ] Overweight/Obesity   [ ] Altered GI Function [ ] Unintended Weight Loss [ ] Food & Nutrition Related Knowledge Deficit [Severe] Malnutrition     Nutrition Diagnosis is [X ongoing  [ ] resolved [ ] not applicable     New Nutrition Diagnosis: [ ] not applicable       Interventions: To meet nutrition needs   Recommend  [ ] Change Diet To:  [ ] Nutrition Supplement  [ ] Nutrition Support  [ X] Other: Advance diet with nutrition supplement as medically feasible or may consider alternated nutrition support as medically feasible     Monitoring and Evaluation:   [X ] PO intake [ x ] Tolerance to diet prescription [ x ] weights [ x ] labs[ x ] follow up per protocol  [ ] other:

## 2022-11-25 NOTE — PROGRESS NOTE ADULT - SUBJECTIVE AND OBJECTIVE BOX
follow up on:  complex medical decision making related to goals of care    [facility] Geriatric and Palliative Consult Service:  Dr. Ashely Farfan: cell ([phone])  Dr. Yadi Nagel: cell ([phone])  Lynnette Arciniega DNP: cell ([phone])  Benja Sofia NP: cell ([phone])   Laila Ballard NP: ENZO Novak LMSW: cell ([phone])     OVERNIGHT EVENTS: C/o of increasing nausea    Present Symptoms:   Pain: denies  Fatigue: severe  Nausea: severe nausea   Lack of Appetite: severe  SOB: mild  Depression: mild   Anxiety: mild    Review of Systems: denies fever, chills, dizziness, headache, SOB, palpitations, chest pain, abdominal pain, diarrhea, constipation     MEDICATIONS  (STANDING):  aMIOdarone    Tablet 100 milliGRAM(s) Oral daily  atorvastatin 20 milliGRAM(s) Oral at bedtime  dabigatran 150 milliGRAM(s) Oral every 12 hours  dexAMETHasone     Tablet 4 milliGRAM(s) Oral daily  diltiazem    milliGRAM(s) Oral daily  doxazosin 4 milliGRAM(s) Oral at bedtime  dronabinol 2.5 milliGRAM(s) Oral three times a day  fentaNYL   Patch  50 MICROgram(s)/Hr 1 Patch Transdermal every 72 hours  lactated ringers. 1000 milliLiter(s) (100 mL/Hr) IV Continuous <Continuous>  lactated ringers. 1000 milliLiter(s) (100 mL/Hr) IV Continuous <Continuous>  losartan 100 milliGRAM(s) Oral daily  pantoprazole    Tablet 40 milliGRAM(s) Oral before breakfast  senna 1 Tablet(s) Oral at bedtime    MEDICATIONS  (PRN):  acetaminophen     Tablet .. 650 milliGRAM(s) Oral every 6 hours PRN Temp greater or equal to 38C (100.4F), Mild Pain (1 - 3)  aluminum hydroxide/magnesium hydroxide/simethicone Suspension 30 milliLiter(s) Oral every 4 hours PRN Dyspepsia  HYDROmorphone   Tablet 2 milliGRAM(s) Oral every 4 hours PRN Severe Pain (7 - 10)  melatonin 3 milliGRAM(s) Oral at bedtime PRN Insomnia  metoclopramide Injectable 10 milliGRAM(s) IV Push every 8 hours PRN vomiting  naloxone 1 mG/mL Injection for Intranasal Use 4 milliGRAM(s) IntraNasal once PRN unresponsiveness  ondansetron Injectable 4 milliGRAM(s) IV Push every 6 hours PRN Nausea and/or Vomiting      PHYSICAL EXAM:  Vital Signs Last 24 Hrs  T(C): 36.8 (25 Nov 2022 05:51), Max: 37.2 (24 Nov 2022 14:04)  T(F): 98.2 (25 Nov 2022 05:51), Max: 98.9 (24 Nov 2022 14:04)  HR: 64 (25 Nov 2022 05:51) (61 - 70)  BP: 124/55 (25 Nov 2022 05:51) (104/52 - 124/55)  BP(mean): --  RR: 18 (25 Nov 2022 05:51) (18 - 20)  SpO2: 98% (25 Nov 2022 05:51) (98% - 100%)    Parameters below as of 25 Nov 2022 05:51  Patient On (Oxygen Delivery Method): room air    PHYSICAL EXAM:  General: Ill appearance, alert and oriented x 3, verbal, in NAD    Palliative Performance Scale/Karnofsky Score: 40-50%  ECOG Performance: 2/3    HEENT: no abnormal lesion, dry mouth    Lungs: nonlabored in RA  CV: RRR, S1S2  GI: soft, non distended, non tender  : voiding freely  Musculoskeletal: weakness, ambulatory with assistance     Skin: warm, dry, poor skin turgor  Neuro: A &Oriented X3, motor strength 5/5 B/L upper and lower extremities  Oral intake ability: clear liquids  Psych: Calm, cooperative       LABS:                          9.3    2.36  )-----------( 64       ( 25 Nov 2022 07:54 )             28.1     11-25    137  |  103  |  13  ----------------------------<  114<H>  3.6   |  29  |  0.63    Ca    8.7      25 Nov 2022 07:54  Phos  3.2     11-24  Mg     2.3     11-24    TPro  5.2<L>  /  Alb  2.7<L>  /  TBili  1.2  /  DBili  x   /  AST  72<H>  /  ALT  290<H>  /  AlkPhos  443<H>  11-25        RADIOLOGY & ADDITIONAL STUDIES: follow up on:  complex medical decision making related to goals of care    [facility] Geriatric and Palliative Consult Service:  Dr. Ashely Farfan: cell ([phone])  Dr. Yadi Nagel: cell ([phone])  Lynnette Arciniega DNP: cell ([phone])  Benja Sofia NP: cell ([phone])   Laila Ballard NP: ENZO Novak LMSW: cell ([phone])     OVERNIGHT EVENTS: C/o of persistent with nausea and vomiting, unable to tolerate diet    Present Symptoms:   Pain: denies  Fatigue: severe  Nausea: severe nausea   Lack of Appetite: severe  SOB: mild  Depression: mild   Anxiety: mild    Review of Systems: denies fever, chills, dizziness, headache, SOB, palpitations, chest pain, abdominal pain, diarrhea, constipation     MEDICATIONS  (STANDING):  aMIOdarone    Tablet 100 milliGRAM(s) Oral daily  atorvastatin 20 milliGRAM(s) Oral at bedtime  dabigatran 150 milliGRAM(s) Oral every 12 hours  dexAMETHasone     Tablet 4 milliGRAM(s) Oral daily  diltiazem    milliGRAM(s) Oral daily  doxazosin 4 milliGRAM(s) Oral at bedtime  dronabinol 2.5 milliGRAM(s) Oral three times a day  fentaNYL   Patch  50 MICROgram(s)/Hr 1 Patch Transdermal every 72 hours  lactated ringers. 1000 milliLiter(s) (100 mL/Hr) IV Continuous <Continuous>  lactated ringers. 1000 milliLiter(s) (100 mL/Hr) IV Continuous <Continuous>  losartan 100 milliGRAM(s) Oral daily  pantoprazole    Tablet 40 milliGRAM(s) Oral before breakfast  senna 1 Tablet(s) Oral at bedtime    MEDICATIONS  (PRN):  acetaminophen     Tablet .. 650 milliGRAM(s) Oral every 6 hours PRN Temp greater or equal to 38C (100.4F), Mild Pain (1 - 3)  aluminum hydroxide/magnesium hydroxide/simethicone Suspension 30 milliLiter(s) Oral every 4 hours PRN Dyspepsia  HYDROmorphone   Tablet 2 milliGRAM(s) Oral every 4 hours PRN Severe Pain (7 - 10)  melatonin 3 milliGRAM(s) Oral at bedtime PRN Insomnia  metoclopramide Injectable 10 milliGRAM(s) IV Push every 8 hours PRN vomiting  naloxone 1 mG/mL Injection for Intranasal Use 4 milliGRAM(s) IntraNasal once PRN unresponsiveness  ondansetron Injectable 4 milliGRAM(s) IV Push every 6 hours PRN Nausea and/or Vomiting      PHYSICAL EXAM:  Vital Signs Last 24 Hrs  T(C): 36.8 (25 Nov 2022 05:51), Max: 37.2 (24 Nov 2022 14:04)  T(F): 98.2 (25 Nov 2022 05:51), Max: 98.9 (24 Nov 2022 14:04)  HR: 64 (25 Nov 2022 05:51) (61 - 70)  BP: 124/55 (25 Nov 2022 05:51) (104/52 - 124/55)  BP(mean): --  RR: 18 (25 Nov 2022 05:51) (18 - 20)  SpO2: 98% (25 Nov 2022 05:51) (98% - 100%)    Parameters below as of 25 Nov 2022 05:51  Patient On (Oxygen Delivery Method): room air    PHYSICAL EXAM:  General: Ill appearance, alert and oriented x 3, verbal, in NAD    Palliative Performance Scale/Karnofsky Score: 40-50%  ECOG Performance: 2/3    HEENT: no abnormal lesion, dry mouth    Lungs: nonlabored in RA  CV: RRR, S1S2, right Mediport  GI: soft, non distended, non tender  : voiding freely  Musculoskeletal: weakness, ambulatory with assistance     Skin: warm, dry, poor skin turgor  Neuro: A&Oriented X3, motor strength 5/5 B/L upper and lower extremities  Oral intake ability: clear liquids  Psych: Calm, cooperative    LABS                        9.3    2.36  )-----------( 64       ( 25 Nov 2022 07:54 )             28.1     11-25    137  |  103  |  13  ----------------------------<  114<H>  3.6   |  29  |  0.63    Ca    8.7      25 Nov 2022 07:54  Phos  3.2     11-24  Mg     2.3     11-24    TPro  5.2<L>  /  Alb  2.7<L>  /  TBili  1.2  /  DBili  x   /  AST  72<H>  /  ALT  290<H>  /  AlkPhos  443<H>  11-25    RADIOLOGY & ADDITIONAL STUDIES:  No new studies

## 2022-11-25 NOTE — PROGRESS NOTE ADULT - SUBJECTIVE AND OBJECTIVE BOX
Patient is a 72y old  Male who presents with a chief complaint of Intractable vomiting (25 Nov 2022 13:11)    INTERVAL HPI/OVERNIGHT EVENTS: pt persistent with nausea and vomiting, unable to tolerate diet    REVIEW OF SYSTEMS:  CONSTITUTIONAL: No fever, chills  ENMT:  No difficulty hearing, no change in vision  NECK: No pain or stiffness  RESPIRATORY: No cough, SOB  CARDIOVASCULAR: No chest pain, palpitations  GASTROINTESTINAL: No abdominal pain. c/o  nausea, vomiting  GENITOURINARY: No dysuria  NEUROLOGICAL: No HA  SKIN: No itching, burning, rashes, or lesions   LYMPH NODES: No enlarged glands  ENDOCRINE: No heat or cold intolerance; No hair loss  MUSCULOSKELETAL: No joint pain or swelling; No muscle, back, or extremity pain  PSYCHIATRIC: No depression, anxiety  HEME/LYMPH: No easy bruising, or bleeding gums    T(C): 36.8 (11-25-22 @ 05:51), Max: 37.2 (11-24-22 @ 14:04)  HR: 64 (11-25-22 @ 05:51) (61 - 70)  BP: 124/55 (11-25-22 @ 05:51) (104/52 - 124/55)  RR: 18 (11-25-22 @ 05:51) (18 - 20)  SpO2: 98% (11-25-22 @ 05:51) (98% - 100%)  Wt(kg): --Vital Signs Last 24 Hrs  T(C): 36.8 (25 Nov 2022 05:51), Max: 37.2 (24 Nov 2022 14:04)  T(F): 98.2 (25 Nov 2022 05:51), Max: 98.9 (24 Nov 2022 14:04)  HR: 64 (25 Nov 2022 05:51) (61 - 70)  BP: 124/55 (25 Nov 2022 05:51) (104/52 - 124/55)  BP(mean): --  RR: 18 (25 Nov 2022 05:51) (18 - 20)  SpO2: 98% (25 Nov 2022 05:51) (98% - 100%)    Parameters below as of 25 Nov 2022 05:51  Patient On (Oxygen Delivery Method): room air    MEDICATIONS  (STANDING):  aMIOdarone    Tablet 100 milliGRAM(s) Oral daily  atorvastatin 20 milliGRAM(s) Oral at bedtime  dabigatran 150 milliGRAM(s) Oral every 12 hours  dexAMETHasone     Tablet 4 milliGRAM(s) Oral daily  diltiazem    milliGRAM(s) Oral daily  doxazosin 4 milliGRAM(s) Oral at bedtime  dronabinol 2.5 milliGRAM(s) Oral three times a day  fentaNYL   Patch  50 MICROgram(s)/Hr 1 Patch Transdermal every 72 hours  lactated ringers. 1000 milliLiter(s) (100 mL/Hr) IV Continuous <Continuous>  lactated ringers. 1000 milliLiter(s) (100 mL/Hr) IV Continuous <Continuous>  losartan 100 milliGRAM(s) Oral daily  pantoprazole    Tablet 40 milliGRAM(s) Oral before breakfast  senna 1 Tablet(s) Oral at bedtime    MEDICATIONS  (PRN):  acetaminophen     Tablet .. 650 milliGRAM(s) Oral every 6 hours PRN Temp greater or equal to 38C (100.4F), Mild Pain (1 - 3)  aluminum hydroxide/magnesium hydroxide/simethicone Suspension 30 milliLiter(s) Oral every 4 hours PRN Dyspepsia  HYDROmorphone   Tablet 2 milliGRAM(s) Oral every 4 hours PRN Severe Pain (7 - 10)  melatonin 3 milliGRAM(s) Oral at bedtime PRN Insomnia  metoclopramide Injectable 10 milliGRAM(s) IV Push every 8 hours PRN vomiting  naloxone 1 mG/mL Injection for Intranasal Use 4 milliGRAM(s) IntraNasal once PRN unresponsiveness  ondansetron Injectable 4 milliGRAM(s) IV Push every 6 hours PRN Nausea and/or Vomiting    PHYSICAL EXAM:  GENERAL: NAD  EYES: clear conjunctiva; EOMI  ENMT: Moist mucous membranes  NECK: Supple, No JVD, Normal thyroid  CHEST/LUNG: Clear to auscultation bilaterally; R chest chemo port  HEART: S1, S2, Regular rate and rhythm  ABDOMEN: Soft, Nontender, Nondistended; Bowel sounds present  NEURO: Alert & Oriented X3  EXTREMITIES: No LE edema, no calf tenderness  LYMPH: No lymphadenopathy noted  SKIN: No rashes or lesions    Consultant(s) Notes Reviewed:  [x ] YES  [ ] NO  Care Discussed with Consultants/Other Providers [ x] YES  [ ] NO    LABS:                        9.3    2.36  )-----------( 64       ( 25 Nov 2022 07:54 )             28.1     11-25    137  |  103  |  13  ----------------------------<  114<H>  3.6   |  29  |  0.63    Ca    8.7      25 Nov 2022 07:54  Phos  3.2     11-24  Mg     2.3     11-24    TPro  5.2<L>  /  Alb  2.7<L>  /  TBili  1.2  /  DBili  x   /  AST  72<H>  /  ALT  290<H>  /  AlkPhos  443<H>  11-25      CAPILLARY BLOOD GLUCOSE    RADIOLOGY & ADDITIONAL TESTS:    Imaging Personally Reviewed:  [x ] YES  [ ] NO  < from: CT Head No Cont (11.23.22 @ 10:01) >    ACC: [id number] EXAM:  CT BRAIN                          PROCEDURE DATE:  11/23/2022          INTERPRETATION:  Noncontrast CT of the brain.    CLINICAL INDICATION:  Status post fall on anticoagulation, evaluate for   hemorrhage    TECHNIQUE : Axial CT scanning of the brain was obtained from the skull   base to the vertex without the administration of intravenous contrast.    COMPARISON: None available    FINDINGS:  No acute hemorrhage, hydrocephalus, midline shift or   extra-axial collections identified. Age-appropriate involutional changes   and mild microvascular ischemic changes are present.    No displaced calvarial fracture is visualized.    The orbits are not remarkable in appearance.    The visualized paranasal sinuses and tympanomastoid cavities are free of   acute disease.    IMPRESSION:    No acute hemorrhage, extra-axial collection or displaced calvarial   fracture.    --- End of Report ---            DAKOTAH WHITT MD; Attending Radiologist  This document has been electronically signed. Nov 23 2022 10:23AM    < end of copied text >  < from: Xray Chest 1 View- PORTABLE-Urgent (11.22.22 @ 14:12) >    ACC: [id number] EXAM:  XR CHEST PORTABLE URGENT 1V                          PROCEDURE DATE:  11/22/2022          INTERPRETATION:  CLINICAL STATEMENT: Chest Pain.    TECHNIQUE: AP view of the chest.    COMPARISON: None available.    FINDINGS/  IMPRESSION:  Right Mediport noted. No consolidation or pleural effusion    Heart size cannot be accurately assessed in this projection.    --- End of Report ---            RAMIRO PARRISH MD; Attending Radiologist  This document has been electronically signed. Nov 22 2022  6:11PM    < end of copied text >

## 2022-11-25 NOTE — PROGRESS NOTE ADULT - ASSESSMENT
· Assessment	  72 year old male with stage 4 pancreatic ca started chemo at [facility].  he presented with N/V.  He also has significant pain at upper abdomen.  No fever or chills    1. N/V, probably due to chemo  he had ondansetron, IVF, one dose decadron   has no appetite, feel nausea and retching after seeing or smelling food  will start marinol, which can help for nausea  pall care eval appreciated    2. abdominal pain from pancreatic ca  will increase fentanyl patch to 50, pain is much better now  he can have dilaudid 2 mg q4h prn  f/u pall care    3 pancreatic ca  f/u at [facility]    4. pancytopenia -- due to chemo, wbc improved, hgb and plts lower but still adequate  -- monitor    5. covid -- monitor clinically    will follow.

## 2022-11-25 NOTE — PROGRESS NOTE ADULT - PROBLEM SELECTOR PLAN 5
Patient with recently Dx with advanced pancreatic cancer, followed by [facility] Oncologist, Lulú Garcia MD, was on active chemo (11/18/2022)    Endorsed: DNR/DNI (See conversation above)    Recommendation:  C/w current medical treatments, goal for patient to recover clinically  Follow HemOnc Recommendations  Supportive care    Palliative Care will follow.

## 2022-11-26 LAB
-  AMPICILLIN: SIGNIFICANT CHANGE UP
-  CIPROFLOXACIN: SIGNIFICANT CHANGE UP
-  LEVOFLOXACIN: SIGNIFICANT CHANGE UP
-  NITROFURANTOIN: SIGNIFICANT CHANGE UP
-  TETRACYCLINE: SIGNIFICANT CHANGE UP
-  VANCOMYCIN: SIGNIFICANT CHANGE UP
ALBUMIN SERPL ELPH-MCNC: 2.5 G/DL — LOW (ref 3.5–5)
ALP SERPL-CCNC: 482 U/L — HIGH (ref 40–120)
ALT FLD-CCNC: 244 U/L DA — HIGH (ref 10–60)
ANION GAP SERPL CALC-SCNC: 7 MMOL/L — SIGNIFICANT CHANGE UP (ref 5–17)
AST SERPL-CCNC: 54 U/L — HIGH (ref 10–40)
BILIRUB SERPL-MCNC: 1.2 MG/DL — SIGNIFICANT CHANGE UP (ref 0.2–1.2)
BUN SERPL-MCNC: 9 MG/DL — SIGNIFICANT CHANGE UP (ref 7–18)
CALCIUM SERPL-MCNC: 8.3 MG/DL — LOW (ref 8.4–10.5)
CHLORIDE SERPL-SCNC: 103 MMOL/L — SIGNIFICANT CHANGE UP (ref 96–108)
CO2 SERPL-SCNC: 28 MMOL/L — SIGNIFICANT CHANGE UP (ref 22–31)
CREAT SERPL-MCNC: 0.57 MG/DL — SIGNIFICANT CHANGE UP (ref 0.5–1.3)
CULTURE RESULTS: SIGNIFICANT CHANGE UP
EGFR: 104 ML/MIN/1.73M2 — SIGNIFICANT CHANGE UP
GLUCOSE SERPL-MCNC: 153 MG/DL — HIGH (ref 70–99)
HCT VFR BLD CALC: 28.4 % — LOW (ref 39–50)
HGB BLD-MCNC: 9.2 G/DL — LOW (ref 13–17)
MCHC RBC-ENTMCNC: 31.8 PG — SIGNIFICANT CHANGE UP (ref 27–34)
MCHC RBC-ENTMCNC: 32.4 GM/DL — SIGNIFICANT CHANGE UP (ref 32–36)
MCV RBC AUTO: 98.3 FL — SIGNIFICANT CHANGE UP (ref 80–100)
METHOD TYPE: SIGNIFICANT CHANGE UP
NRBC # BLD: 0 /100 WBCS — SIGNIFICANT CHANGE UP (ref 0–0)
ORGANISM # SPEC MICROSCOPIC CNT: SIGNIFICANT CHANGE UP
PLATELET # BLD AUTO: 71 K/UL — LOW (ref 150–400)
POTASSIUM SERPL-MCNC: 3.6 MMOL/L — SIGNIFICANT CHANGE UP (ref 3.5–5.3)
POTASSIUM SERPL-SCNC: 3.6 MMOL/L — SIGNIFICANT CHANGE UP (ref 3.5–5.3)
PROT SERPL-MCNC: 5.3 G/DL — LOW (ref 6–8.3)
RBC # BLD: 2.89 M/UL — LOW (ref 4.2–5.8)
RBC # FLD: 12.4 % — SIGNIFICANT CHANGE UP (ref 10.3–14.5)
SODIUM SERPL-SCNC: 138 MMOL/L — SIGNIFICANT CHANGE UP (ref 135–145)
SPECIMEN SOURCE: SIGNIFICANT CHANGE UP
WBC # BLD: 1.98 K/UL — LOW (ref 3.8–10.5)
WBC # FLD AUTO: 1.98 K/UL — LOW (ref 3.8–10.5)

## 2022-11-26 RX ORDER — LIDOCAINE 4 G/100G
15 CREAM TOPICAL ONCE
Refills: 0 | Status: COMPLETED | OUTPATIENT
Start: 2022-11-26 | End: 2022-11-26

## 2022-11-26 RX ORDER — LIDOCAINE 4 G/100G
15 CREAM TOPICAL THREE TIMES A DAY
Refills: 0 | Status: COMPLETED | OUTPATIENT
Start: 2022-11-26 | End: 2022-11-26

## 2022-11-26 RX ORDER — SODIUM CHLORIDE 0.65 %
1 AEROSOL, SPRAY (ML) NASAL
Refills: 0 | Status: COMPLETED | OUTPATIENT
Start: 2022-11-26 | End: 2022-11-28

## 2022-11-26 RX ORDER — LIDOCAINE 4 G/100G
CREAM TOPICAL
Refills: 0 | Status: COMPLETED | OUTPATIENT
Start: 2022-11-26 | End: 2022-11-27

## 2022-11-26 RX ADMIN — LIDOCAINE 15 MILLILITER(S): 4 CREAM TOPICAL at 18:11

## 2022-11-26 RX ADMIN — HYDROMORPHONE HYDROCHLORIDE 2 MILLIGRAM(S): 2 INJECTION INTRAMUSCULAR; INTRAVENOUS; SUBCUTANEOUS at 23:08

## 2022-11-26 RX ADMIN — PANTOPRAZOLE SODIUM 40 MILLIGRAM(S): 20 TABLET, DELAYED RELEASE ORAL at 05:46

## 2022-11-26 RX ADMIN — HYDROMORPHONE HYDROCHLORIDE 2 MILLIGRAM(S): 2 INJECTION INTRAMUSCULAR; INTRAVENOUS; SUBCUTANEOUS at 22:30

## 2022-11-26 RX ADMIN — HYDROMORPHONE HYDROCHLORIDE 2 MILLIGRAM(S): 2 INJECTION INTRAMUSCULAR; INTRAVENOUS; SUBCUTANEOUS at 02:42

## 2022-11-26 RX ADMIN — HYDROMORPHONE HYDROCHLORIDE 2 MILLIGRAM(S): 2 INJECTION INTRAMUSCULAR; INTRAVENOUS; SUBCUTANEOUS at 02:12

## 2022-11-26 RX ADMIN — Medication 4 MILLIGRAM(S): at 21:27

## 2022-11-26 RX ADMIN — DABIGATRAN ETEXILATE MESYLATE 150 MILLIGRAM(S): 150 CAPSULE ORAL at 18:11

## 2022-11-26 RX ADMIN — LIDOCAINE 15 MILLILITER(S): 4 CREAM TOPICAL at 05:38

## 2022-11-26 RX ADMIN — DABIGATRAN ETEXILATE MESYLATE 150 MILLIGRAM(S): 150 CAPSULE ORAL at 06:53

## 2022-11-26 RX ADMIN — FENTANYL CITRATE 1 PATCH: 50 INJECTION INTRAVENOUS at 11:00

## 2022-11-26 RX ADMIN — Medication 2.5 MILLIGRAM(S): at 07:03

## 2022-11-26 RX ADMIN — LOSARTAN POTASSIUM 100 MILLIGRAM(S): 100 TABLET, FILM COATED ORAL at 05:46

## 2022-11-26 RX ADMIN — Medication 1 SPRAY(S): at 12:14

## 2022-11-26 RX ADMIN — AMIODARONE HYDROCHLORIDE 100 MILLIGRAM(S): 400 TABLET ORAL at 05:46

## 2022-11-26 RX ADMIN — HYDROMORPHONE HYDROCHLORIDE 2 MILLIGRAM(S): 2 INJECTION INTRAMUSCULAR; INTRAVENOUS; SUBCUTANEOUS at 11:55

## 2022-11-26 RX ADMIN — FENTANYL CITRATE 1 PATCH: 50 INJECTION INTRAVENOUS at 07:15

## 2022-11-26 RX ADMIN — Medication 2.5 MILLIGRAM(S): at 18:10

## 2022-11-26 RX ADMIN — FENTANYL CITRATE 1 PATCH: 50 INJECTION INTRAVENOUS at 13:57

## 2022-11-26 RX ADMIN — FENTANYL CITRATE 1 PATCH: 50 INJECTION INTRAVENOUS at 19:26

## 2022-11-26 RX ADMIN — Medication 120 MILLIGRAM(S): at 05:46

## 2022-11-26 RX ADMIN — Medication 4 MILLIGRAM(S): at 06:52

## 2022-11-26 RX ADMIN — Medication 2.5 MILLIGRAM(S): at 22:17

## 2022-11-26 RX ADMIN — ATORVASTATIN CALCIUM 20 MILLIGRAM(S): 80 TABLET, FILM COATED ORAL at 21:26

## 2022-11-26 RX ADMIN — SENNA PLUS 1 TABLET(S): 8.6 TABLET ORAL at 21:26

## 2022-11-26 RX ADMIN — Medication 1 SPRAY(S): at 02:12

## 2022-11-26 RX ADMIN — HYDROMORPHONE HYDROCHLORIDE 2 MILLIGRAM(S): 2 INJECTION INTRAMUSCULAR; INTRAVENOUS; SUBCUTANEOUS at 12:25

## 2022-11-26 NOTE — PROGRESS NOTE ADULT - SUBJECTIVE AND OBJECTIVE BOX
HPI:  72 male PMHx HTN, Afib (on pradaxa), and recently dx stage IV pancreatic cancer (inoperable, on chemo) presents with intractable nausea and vomiting for 2 days. Patient was diagnosed with pancreatic cancer at [facility] during a hospital admission for CVA. Pt was recently started on chemotherapy and received his last session this past friday 11/18. Since then patient has had severe nausea and multiple episodes of NBNB vomiting unrelieved by his compazine or prochlorperazine which he was prescribed by his oncologist. Patient has also had loss of appetite unrelated to his nausea and epigastric pain. Patient endorses dizziness as well and had a presyncopal episode this morning, denies head trauma or loss of consciousness Patient denies fever chills, headache, chest pain, palpitations, cough, SOB, diarrhea/constipation, dysuria or leg swelling. NKDA. Patient follows Dr. Lulú Garcia at [facility] (22 Nov 2022 15:29)      Patient is a 72y old  Male who presents with a chief complaint of Intractable vomiting (26 Nov 2022 14:40)      INTERVAL HPI/OVERNIGHT EVENTS:  T(C): 37.1 (11-26-22 @ 12:46), Max: 37.1 (11-26-22 @ 12:46)  HR: 67 (11-26-22 @ 12:46) (62 - 67)  BP: 115/55 (11-26-22 @ 12:46) (106/50 - 133/65)  RR: 17 (11-26-22 @ 12:46) (16 - 18)  SpO2: 100% (11-26-22 @ 12:46) (97% - 100%)  Wt(kg): --  I&O's Summary      REVIEW OF SYSTEMS: denies fever, chills, SOB, palpitations, chest pain, abdominal pain, nausea, vomitting, diarrhea, constipation, dizziness    MEDICATIONS  (STANDING):  aMIOdarone    Tablet 100 milliGRAM(s) Oral daily  atorvastatin 20 milliGRAM(s) Oral at bedtime  dabigatran 150 milliGRAM(s) Oral every 12 hours  dexAMETHasone     Tablet 4 milliGRAM(s) Oral daily  diltiazem    milliGRAM(s) Oral daily  doxazosin 4 milliGRAM(s) Oral at bedtime  dronabinol 2.5 milliGRAM(s) Oral three times a day  fentaNYL   Patch  50 MICROgram(s)/Hr 1 Patch Transdermal every 72 hours  lactated ringers. 1000 milliLiter(s) (100 mL/Hr) IV Continuous <Continuous>  lactated ringers. 1000 milliLiter(s) (100 mL/Hr) IV Continuous <Continuous>  lidocaine 2% Viscous      lidocaine 2% Viscous 15 milliLiter(s) Swish and Spit three times a day  losartan 100 milliGRAM(s) Oral daily  pantoprazole    Tablet 40 milliGRAM(s) Oral before breakfast  senna 1 Tablet(s) Oral at bedtime    MEDICATIONS  (PRN):  acetaminophen     Tablet .. 650 milliGRAM(s) Oral every 6 hours PRN Temp greater or equal to 38C (100.4F), Mild Pain (1 - 3)  aluminum hydroxide/magnesium hydroxide/simethicone Suspension 30 milliLiter(s) Oral every 4 hours PRN Dyspepsia  HYDROmorphone   Tablet 2 milliGRAM(s) Oral every 4 hours PRN Severe Pain (7 - 10)  melatonin 3 milliGRAM(s) Oral at bedtime PRN Insomnia  metoclopramide Injectable 10 milliGRAM(s) IV Push every 8 hours PRN vomiting  naloxone 1 mG/mL Injection for Intranasal Use 4 milliGRAM(s) IntraNasal once PRN unresponsiveness  ondansetron Injectable 4 milliGRAM(s) IV Push every 6 hours PRN Nausea and/or Vomiting  sodium chloride 0.65% Nasal 1 Spray(s) Both Nostrils two times a day PRN Nasal Congestion      PHYSICAL EXAM:  GENERAL: NAD, well-groomed, well-developed  HEAD:  Atraumatic, Normocephalic  EYES: EOMI, PERRLA, conjunctiva and sclera clear  ENMT: No tonsillar erythema, exudates, or enlargement; Moist mucous membranes, Good dentition, No lesions  NECK: Supple, No JVD, Normal thyroid  NERVOUS SYSTEM:  Alert & Oriented X3, Good concentration; Motor Strength 5/5 B/L upper and lower extremities; DTRs 2+ intact and symmetric  CHEST/LUNG: Clear to percussion bilaterally; No rales, rhonchi, wheezing, or rubs  HEART: Regular rate and rhythm; No murmurs, rubs, or gallops  ABDOMEN: Soft, Nontender, Nondistended; Bowel sounds present  EXTREMITIES:  2+ Peripheral Pulses, No clubbing, cyanosis, or edema  LYMPH: No lymphadenopathy noted  SKIN: No rashes or lesions  LABS:                        9.2    1.98  )-----------( 71       ( 26 Nov 2022 07:49 )             28.4     11-26    138  |  103  |  9   ----------------------------<  153<H>  3.6   |  28  |  0.57    Ca    8.3<L>      26 Nov 2022 07:49    TPro  5.3<L>  /  Alb  2.5<L>  /  TBili  1.2  /  DBili  x   /  AST  54<H>  /  ALT  244<H>  /  AlkPhos  482<H>  11-26        CAPILLARY BLOOD GLUCOSE

## 2022-11-26 NOTE — PROGRESS NOTE ADULT - ASSESSMENT
72 year old male with stage 4 pancreatic ca started chemo at [facility].  he presented with N/V.  He also has significant pain at upper abdomen.  No fever or chills    1. N/V, likely secondary to chemo s/p zofran and decadron  - Started on marinol.   - Pall care following as well appreciate input     2. Abdominal pain from pancreatic ca  Continue pain regimen  fentanyl patch and dilaudid 2 mg q4h prn  f/u pall care recs     3 pancreatic ca  f/u at [facility] [place]     4. Pancytopenia   - Secondary to chemotherapy  - Counts stable will continue to follow   - Please transfuse for Hgb <7 or platelets less than 10 or bleeding      5. covid -- monitor clinically    Rory Waggoner D.O  Hematology Oncology   [facility]  [phone] ( Office)   Evenings and weekends please call MD on call or office  72 year old male with stage 4 pancreatic ca started chemo at [facility].  he presented with N/V.  He also has significant pain at upper abdomen.  No fever or chills    1. N/V, likely secondary to chemo s/p zofran and decadron  - Started on marinol. Nausea under control  - Advance diet as tolerated.  - Pall care following as well appreciate input   - Follow up with [facility] oncology joan D/C     2. Abdominal pain from pancreatic ca  Continue pain regimen  fentanyl patch and dilaudid pain controlled   - ongoing f/u with palliative   3 pancreatic ca  f/u at [facility] upon discharge     4. Pancytopenia   - Secondary to chemotherapy  - Counts stable will continue to follow   - Please transfuse for Hgb <7 or platelets less than 10 or bleeding      5. COVID   -- Clinically doing well   - ongoing management per primary team. Appreciate care    6. Dispo: Updated wife on plan of care after discussion with primary team. All questions answered. She requested PT for him to ensure he can walk when he goes home.   PT eval when able     We will continue to follow along with you in collaboration with [facility].        Rory Waggoner D.O  Hematology Oncology   [facility]  [phone] ( Office)   Evenings and weekends please call MD on call or office  72 year old male with stage 4 pancreatic ca started chemo [place] [facility].  he presented with N/V.  He also has significant pain at upper abdomen.  No fever or chills    1. N/V, likely secondary to chemo s/p zofran and decadron  - Started on marinol. Nausea under control  - Advance diet as tolerated.  - Pall care following as well appreciate input   - Follow up with [facility] oncology joan D/C     2. Abdominal pain from pancreatic ca  Continue pain regimen  fentanyl patch and dilaudid pain controlled   - ongoing f/u with palliative   3 pancreatic ca  f/u at [facility] upon discharge     4. Pancytopenia.   - Secondary to chemotherapy  - Counts stable will continue to follow   - Please transfuse for Hgb <7 or platelets less than 10 or bleeding      5. COVID   -- Clinically doing well   - ongoing management per primary team. Appreciate care    6. Dispo: Updated wife on plan of care after discussion with primary team. All questions answered. She requested PT for him to ensure he can walk when he goes home.   PT eval when able     We will continue to follow along with you in collaboration with [facility].        Rory Waggoner D.O  Hematology Oncology   [facility]  [phone] ( Office)   Evenings and weekends please call MD on call or office

## 2022-11-26 NOTE — PROGRESS NOTE ADULT - ASSESSMENT
seen and examiend vsstable on bed comfortable  denies  SOB, COUGH, CP  NO FEVER    DENIES ABD PAIN VOMITING  LUNGS CLEAR ABD SOFT BS NML  NON TENDER  L  LABS NOTED wbc 1.98  lft better  ur cxs pos enteroccus [id number] colonies  hgb 9.3  a/p metastatic pancraetic ca   on pain meds stable  onc on board   urine cxs were [id number]  no fever  but as pt came with almost pancytopenic state  i texted ID  dylan see pt and and any antibiotics use  covid pos  but no cough  no sob   pulse ox ok   oob in chair  now DNR

## 2022-11-26 NOTE — PROGRESS NOTE ADULT - SUBJECTIVE AND OBJECTIVE BOX
Patient is a 72y old  Male who presents with a chief complaint of Intractable vomiting (25 Nov 2022 13:21)      MEDICATIONS  (STANDING):  aMIOdarone    Tablet 100 milliGRAM(s) Oral daily  atorvastatin 20 milliGRAM(s) Oral at bedtime  dabigatran 150 milliGRAM(s) Oral every 12 hours  dexAMETHasone     Tablet 4 milliGRAM(s) Oral daily  diltiazem    milliGRAM(s) Oral daily  doxazosin 4 milliGRAM(s) Oral at bedtime  dronabinol 2.5 milliGRAM(s) Oral three times a day  fentaNYL   Patch  50 MICROgram(s)/Hr 1 Patch Transdermal every 72 hours  lactated ringers. 1000 milliLiter(s) (100 mL/Hr) IV Continuous <Continuous>  lactated ringers. 1000 milliLiter(s) (100 mL/Hr) IV Continuous <Continuous>  lidocaine 2% Viscous      lidocaine 2% Viscous 15 milliLiter(s) Swish and Spit three times a day  losartan 100 milliGRAM(s) Oral daily  pantoprazole    Tablet 40 milliGRAM(s) Oral before breakfast  senna 1 Tablet(s) Oral at bedtime    MEDICATIONS  (PRN):  acetaminophen     Tablet .. 650 milliGRAM(s) Oral every 6 hours PRN Temp greater or equal to 38C (100.4F), Mild Pain (1 - 3)  aluminum hydroxide/magnesium hydroxide/simethicone Suspension 30 milliLiter(s) Oral every 4 hours PRN Dyspepsia  HYDROmorphone   Tablet 2 milliGRAM(s) Oral every 4 hours PRN Severe Pain (7 - 10)  melatonin 3 milliGRAM(s) Oral at bedtime PRN Insomnia  metoclopramide Injectable 10 milliGRAM(s) IV Push every 8 hours PRN vomiting  naloxone 1 mG/mL Injection for Intranasal Use 4 milliGRAM(s) IntraNasal once PRN unresponsiveness  ondansetron Injectable 4 milliGRAM(s) IV Push every 6 hours PRN Nausea and/or Vomiting  sodium chloride 0.65% Nasal 1 Spray(s) Both Nostrils two times a day PRN Nasal Congestion      ROS  No fever, sweats, chills  No epistaxis, HA, sore throat  No CP, SOB, cough, sputum  No n/v/d, abd pain, melena, hematochezia  No edema  No rash  No anxiety  No back pain, joint pain  No bleeding, bruising  No dysuria, hematuria    Vital Signs Last 24 Hrs  T(C): 37.1 (26 Nov 2022 12:46), Max: 37.1 (25 Nov 2022 14:43)  T(F): 98.8 (26 Nov 2022 12:46), Max: 98.8 (25 Nov 2022 14:43)  HR: 67 (26 Nov 2022 12:46) (62 - 67)  BP: 115/55 (26 Nov 2022 12:46) (106/50 - 133/65)  BP(mean): --  RR: 17 (26 Nov 2022 12:46) (16 - 18)  SpO2: 100% (26 Nov 2022 12:46) (97% - 100%)    Parameters below as of 26 Nov 2022 12:46  Patient On (Oxygen Delivery Method): room air        PE  NAD  Awake, alert  Anicteric, MMM  RRR  CTAB  Abd soft, NT, ND  No c/c/e  No rash grossly  FROM                          9.2    1.98  )-----------( 71       ( 26 Nov 2022 07:49 )             28.4       11-26    138  |  103  |  9   ----------------------------<  153<H>  3.6   |  28  |  0.57    Ca    8.3<L>      26 Nov 2022 07:49    TPro  5.3<L>  /  Alb  2.5<L>  /  TBili  1.2  /  DBili  x   /  AST  54<H>  /  ALT  244<H>  /  AlkPhos  482<H>  11-26       Patient is a 72y old  Male who presents with a chief complaint of Intractable vomiting (25 Nov 2022 13:21)    Patient seen in follow up today. No new issues overnight. Tolerating liquids. No new complaints. Pain controlled     MEDICATIONS  (STANDING):  aMIOdarone    Tablet 100 milliGRAM(s) Oral daily  atorvastatin 20 milliGRAM(s) Oral at bedtime  dabigatran 150 milliGRAM(s) Oral every 12 hours  dexAMETHasone     Tablet 4 milliGRAM(s) Oral daily  diltiazem    milliGRAM(s) Oral daily  doxazosin 4 milliGRAM(s) Oral at bedtime  dronabinol 2.5 milliGRAM(s) Oral three times a day  fentaNYL   Patch  50 MICROgram(s)/Hr 1 Patch Transdermal every 72 hours  lactated ringers. 1000 milliLiter(s) (100 mL/Hr) IV Continuous <Continuous>  lactated ringers. 1000 milliLiter(s) (100 mL/Hr) IV Continuous <Continuous>  lidocaine 2% Viscous      lidocaine 2% Viscous 15 milliLiter(s) Swish and Spit three times a day  losartan 100 milliGRAM(s) Oral daily  pantoprazole    Tablet 40 milliGRAM(s) Oral before breakfast  senna 1 Tablet(s) Oral at bedtime    MEDICATIONS  (PRN):  acetaminophen     Tablet .. 650 milliGRAM(s) Oral every 6 hours PRN Temp greater or equal to 38C (100.4F), Mild Pain (1 - 3)  aluminum hydroxide/magnesium hydroxide/simethicone Suspension 30 milliLiter(s) Oral every 4 hours PRN Dyspepsia  HYDROmorphone   Tablet 2 milliGRAM(s) Oral every 4 hours PRN Severe Pain (7 - 10)  melatonin 3 milliGRAM(s) Oral at bedtime PRN Insomnia  metoclopramide Injectable 10 milliGRAM(s) IV Push every 8 hours PRN vomiting  naloxone 1 mG/mL Injection for Intranasal Use 4 milliGRAM(s) IntraNasal once PRN unresponsiveness  ondansetron Injectable 4 milliGRAM(s) IV Push every 6 hours PRN Nausea and/or Vomiting  sodium chloride 0.65% Nasal 1 Spray(s) Both Nostrils two times a day PRN Nasal Congestion      ROS  No fever, sweats, chills  No epistaxis, HA, sore throat  No CP, SOB, cough, sputum  No n/v/d, abd pain, melena, hematochezia  No edema  No rash  No anxiety  No back pain, joint pain  No bleeding, bruising  No dysuria, hematuria    Vital Signs Last 24 Hrs  T(C): 37.1 (26 Nov 2022 12:46), Max: 37.1 (25 Nov 2022 14:43)  T(F): 98.8 (26 Nov 2022 12:46), Max: 98.8 (25 Nov 2022 14:43)  HR: 67 (26 Nov 2022 12:46) (62 - 67)  BP: 115/55 (26 Nov 2022 12:46) (106/50 - 133/65)  BP(mean): --  RR: 17 (26 Nov 2022 12:46) (16 - 18)  SpO2: 100% (26 Nov 2022 12:46) (97% - 100%)    Parameters below as of 26 Nov 2022 12:46  Patient On (Oxygen Delivery Method): room air        PE  NAD  Awake, alert  Anicteric, MMM  RRR  CTAB  Abd soft, NT, ND  No c/c/e  No rash grossly  FROM                          9.2    1.98  )-----------( 71       ( 26 Nov 2022 07:49 )             28.4       11-26    138  |  103  |  9   ----------------------------<  153<H>  3.6   |  28  |  0.57    Ca    8.3<L>      26 Nov 2022 07:49    TPro  5.3<L>  /  Alb  2.5<L>  /  TBili  1.2  /  DBili  x   /  AST  54<H>  /  ALT  244<H>  /  AlkPhos  482<H>  11-26

## 2022-11-27 LAB
ANION GAP SERPL CALC-SCNC: 7 MMOL/L — SIGNIFICANT CHANGE UP (ref 5–17)
BUN SERPL-MCNC: 9 MG/DL — SIGNIFICANT CHANGE UP (ref 7–18)
CALCIUM SERPL-MCNC: 8.7 MG/DL — SIGNIFICANT CHANGE UP (ref 8.4–10.5)
CHLORIDE SERPL-SCNC: 105 MMOL/L — SIGNIFICANT CHANGE UP (ref 96–108)
CO2 SERPL-SCNC: 28 MMOL/L — SIGNIFICANT CHANGE UP (ref 22–31)
CREAT SERPL-MCNC: 0.67 MG/DL — SIGNIFICANT CHANGE UP (ref 0.5–1.3)
EGFR: 99 ML/MIN/1.73M2 — SIGNIFICANT CHANGE UP
GLUCOSE SERPL-MCNC: 168 MG/DL — HIGH (ref 70–99)
HCT VFR BLD CALC: 28.7 % — LOW (ref 39–50)
HGB BLD-MCNC: 9.5 G/DL — LOW (ref 13–17)
MCHC RBC-ENTMCNC: 32.3 PG — SIGNIFICANT CHANGE UP (ref 27–34)
MCHC RBC-ENTMCNC: 33.1 GM/DL — SIGNIFICANT CHANGE UP (ref 32–36)
MCV RBC AUTO: 97.6 FL — SIGNIFICANT CHANGE UP (ref 80–100)
NRBC # BLD: 0 /100 WBCS — SIGNIFICANT CHANGE UP (ref 0–0)
PLATELET # BLD AUTO: 112 K/UL — LOW (ref 150–400)
POTASSIUM SERPL-MCNC: 3.2 MMOL/L — LOW (ref 3.5–5.3)
POTASSIUM SERPL-SCNC: 3.2 MMOL/L — LOW (ref 3.5–5.3)
RBC # BLD: 2.94 M/UL — LOW (ref 4.2–5.8)
RBC # FLD: 12.3 % — SIGNIFICANT CHANGE UP (ref 10.3–14.5)
SODIUM SERPL-SCNC: 140 MMOL/L — SIGNIFICANT CHANGE UP (ref 135–145)
WBC # BLD: 2.99 K/UL — LOW (ref 3.8–10.5)
WBC # FLD AUTO: 2.99 K/UL — LOW (ref 3.8–10.5)

## 2022-11-27 RX ORDER — POTASSIUM CHLORIDE 20 MEQ
40 PACKET (EA) ORAL ONCE
Refills: 0 | Status: COMPLETED | OUTPATIENT
Start: 2022-11-27 | End: 2022-11-27

## 2022-11-27 RX ADMIN — Medication 40 MILLIEQUIVALENT(S): at 10:53

## 2022-11-27 RX ADMIN — Medication 2.5 MILLIGRAM(S): at 14:40

## 2022-11-27 RX ADMIN — PANTOPRAZOLE SODIUM 40 MILLIGRAM(S): 20 TABLET, DELAYED RELEASE ORAL at 05:53

## 2022-11-27 RX ADMIN — Medication 30 MILLILITER(S): at 06:37

## 2022-11-27 RX ADMIN — DABIGATRAN ETEXILATE MESYLATE 150 MILLIGRAM(S): 150 CAPSULE ORAL at 17:49

## 2022-11-27 RX ADMIN — Medication 4 MILLIGRAM(S): at 22:32

## 2022-11-27 RX ADMIN — FENTANYL CITRATE 1 PATCH: 50 INJECTION INTRAVENOUS at 08:19

## 2022-11-27 RX ADMIN — LOSARTAN POTASSIUM 100 MILLIGRAM(S): 100 TABLET, FILM COATED ORAL at 05:54

## 2022-11-27 RX ADMIN — Medication 4 MILLIGRAM(S): at 05:54

## 2022-11-27 RX ADMIN — ATORVASTATIN CALCIUM 20 MILLIGRAM(S): 80 TABLET, FILM COATED ORAL at 22:31

## 2022-11-27 RX ADMIN — AMIODARONE HYDROCHLORIDE 100 MILLIGRAM(S): 400 TABLET ORAL at 05:53

## 2022-11-27 RX ADMIN — Medication 2.5 MILLIGRAM(S): at 05:53

## 2022-11-27 RX ADMIN — Medication 120 MILLIGRAM(S): at 05:53

## 2022-11-27 RX ADMIN — FENTANYL CITRATE 1 PATCH: 50 INJECTION INTRAVENOUS at 20:27

## 2022-11-27 RX ADMIN — DABIGATRAN ETEXILATE MESYLATE 150 MILLIGRAM(S): 150 CAPSULE ORAL at 05:54

## 2022-11-27 RX ADMIN — SENNA PLUS 1 TABLET(S): 8.6 TABLET ORAL at 22:32

## 2022-11-27 RX ADMIN — Medication 2.5 MILLIGRAM(S): at 22:32

## 2022-11-27 NOTE — CONSULT NOTE ADULT - GASTROINTESTINAL
soft/nondistended/normal active bowel sounds/no guarding/no rigidity/no organomegaly/no palpable cornelius/tender details…

## 2022-11-27 NOTE — PROGRESS NOTE ADULT - ASSESSMENT
72 year old male with stage 4 pancreatic ca started chemo at [facility] [place].  he presented with N/V.  He also has significant pain at upper abdomen.  No fever or chills    1. N/V, likely secondary to chemo: resolved   -- Pall care following as well appreciate input   - Follow up with [facility] oncology upon D/C     2. Abdominal pain from pancreatic ca  - Controlled with current pain regimen  fentanyl patch and dilaudid pain controlled   - ongoing f/u with palliative     3 pancreatic ca  f/u at [facility] upon discharge     4. Pancytopenia. - Secondary to chemotherapy  - Counts  stable to improved   - Please transfuse for Hgb <7 or platelets less than 10 or bleeding      5. COVID   -- Clinically doing well   - ongoing management per primary team. Appreciate care    oRry Waggoner D.O  Hematology Oncology   [facility]  [phone] ( Office)   Evenings and weekends please call MD on call or office  72 year old male with stage 4 pancreatic ca started chemo at [facility] [place].  he presented with N/V.  He also has significant pain at upper abdomen.  No fever or chills    1. N/V, likely secondary to chemo: resolved with antiemetics   - Advance diet as tolerated  -- Pall care following as well appreciate input   - Follow up with [facility] oncology upon D/C     2. Abdominal pain from pancreatic ca  - Controlled with current pain regimen  fentanyl patch and dilaudid pain controlled   - Palliative following    3 Pancreatic ca  - f/u at [facility] upon discharge   - He has chemo scheduled on Friday. Discussed with patient will depend on how he is feeling and will defer to his oncologist     4. Pancytopenia. - Secondary to chemotherapy  - Counts  stable to improved   - Please transfuse for Hgb <7 or platelets less than 10 or bleeding      5. COVID   -- Clinically doing well   - ongoing management per primary team. Appreciate care    Patient believes he may need some supportive services when he goes home.   - PT input and case management     We will continue to follow along with you in collaboration with [facility].     Rory Waggoner D.O  Hematology Oncology   [facility]  [phone] ( Office)   Evenings and weekends please call MD on call or office

## 2022-11-27 NOTE — CONSULT NOTE ADULT - SUBJECTIVE AND OBJECTIVE BOX
HPI:  72 male PMHx HTN, Afib (on pradaxa), and recently dx stage IV pancreatic cancer (inoperable, on chemo) presents with intractable nausea and vomiting for 2 days. Patient was diagnosed with pancreatic cancer at [facility] during a hospital admission for CVA. Pt was recently started on chemotherapy and received his last session this past friday 11/18. Since then patient has had severe nausea and multiple episodes of NBNB vomiting unrelieved by his compazine or prochlorperazine which he was prescribed by his oncologist. Patient has also had loss of appetite unrelated to his nausea and epigastric pain. Patient endorses dizziness as well and had a presyncopal episode this morning, denies head trauma or loss of consciousness Patient denies fever chills, headache, chest pain, palpitations, cough, SOB, diarrhea/constipation, dysuria or leg swelling. NKDA. [place]ul[place] [facility] [place] 15:29[place]      PAST MEDICAL & SURGICAL HISTORY:  HTN (hypertension)      History of TIAs      Atrial fibrillation      Hyperlipidemia      Pancreatic cancer      No significant past surgical history          No Known Allergies      Meds:  acetaminophen     Tablet .. 650 milliGRAM(s) Oral every 6 hours PRN  aluminum hydroxide/magnesium hydroxide/simethicone Suspension 30 milliLiter(s) Oral every 4 hours PRN  aMIOdarone    Tablet 100 milliGRAM(s) Oral daily  atorvastatin 20 milliGRAM(s) Oral at bedtime  dabigatran 150 milliGRAM(s) Oral every 12 hours  dexAMETHasone     Tablet 4 milliGRAM(s) Oral daily  diltiazem    milliGRAM(s) Oral daily  doxazosin 4 milliGRAM(s) Oral at bedtime  dronabinol 2.5 milliGRAM(s) Oral three times a day  fentaNYL   Patch  50 MICROgram(s)/Hr 1 Patch Transdermal every 72 hours  HYDROmorphone   Tablet 2 milliGRAM(s) Oral every 4 hours PRN  lactated ringers. 1000 milliLiter(s) IV Continuous <Continuous>  lactated ringers. 1000 milliLiter(s) IV Continuous <Continuous>  losartan 100 milliGRAM(s) Oral daily  melatonin 3 milliGRAM(s) Oral at bedtime PRN  metoclopramide Injectable 10 milliGRAM(s) IV Push every 8 hours PRN  naloxone 1 mG/mL Injection for Intranasal Use 4 milliGRAM(s) IntraNasal once PRN  ondansetron Injectable 4 milliGRAM(s) IV Push every 6 hours PRN  pantoprazole    Tablet 40 milliGRAM(s) Oral before breakfast  senna 1 Tablet(s) Oral at bedtime  sodium chloride 0.65% Nasal 1 Spray(s) Both Nostrils two times a day PRN      SOCIAL HISTORY:  Smoker:  YES / NO        PACK YEARS:                         WHEN QUIT?  ETOH use:  YES / NO               FREQUENCY / QUANTITY:  Ilicit Drug use:  YES / NO  Occupation:  Assisted device use (Cane / Walker):  Live with:    FAMILY HISTORY:  FH: colon cancer (Mother)    FH: lung cancer (Father)        VITALS:  Vital Signs Last 24 Hrs  T(C): 37.1 (27 Nov 2022 14:26), Max: 37.1 (27 Nov 2022 14:26)  T(F): 98.7 (27 Nov 2022 14:26), Max: 98.7 (27 Nov 2022 14:26)  HR: 75 (27 Nov 2022 14:26) (66 - 75)  BP: 103/58 (27 Nov 2022 14:26) (103/58 - 118/63)  BP(mean): --  RR: 17 (27 Nov 2022 14:26) (16 - 17)  SpO2: 98% (27 Nov 2022 14:26) (96% - 98%)    Parameters below as of 27 Nov 2022 14:26  Patient On (Oxygen Delivery Method): room air        LABS/DIAGNOSTIC TESTS:                          9.5    2.99  )-----------( 112      ( 27 Nov 2022 05:50 )             28.7     WBC Count: 2.99 K/uL (11-27 @ 05:50)  WBC Count: 1.98 K/uL (11-26 @ 07:49)  WBC Count: 2.36 K/uL (11-25 @ 07:54)      11-27    140  |  105  |  9   ----------------------------<  168<H>  3.2<L>   |  28  |  0.67    Ca    8.7      27 Nov 2022 05:50    TPro  5.3<L>  /  Alb  2.5<L>  /  TBili  1.2  /  DBili  x   /  AST  54<H>  /  ALT  244<H>  /  AlkPhos  482<H>  11-26          LIVER FUNCTIONS - ( 26 Nov 2022 07:49 )  Alb: 2.5 g/dL / Pro: 5.3 g/dL / ALK PHOS: 482 U/L / ALT: 244 U/L DA / AST: 54 U/L / GGT: x                 LACTATE:    ABG -     CULTURES:   .Blood Blood  11-23 @ 06:29   No growth to date.  --  --      Catheterized Catheterized  11-22 @ 22:00   10,000 - 49,000 CFU/mL Enterococcus faecalis  <10,000 CFU/ml Normal Urogenital blake present  --  Enterococcus faecalis          RADIOLOGY:< from: Xray Chest 1 View- PORTABLE-Urgent (11.22.22 @ 14:12) >  ACC: [id number] EXAM:  XR CHEST PORTABLE URGENT 1V                          PROCEDURE DATE:  11/22/2022          INTERPRETATION:  CLINICAL STATEMENT: Chest Pain.    TECHNIQUE: AP view of the chest.    COMPARISON: None available.    FINDINGS/  IMPRESSION:  Right Mediport noted. No consolidation or pleural effusion    Heart size cannot be accurately assessed in this projection.    --- End of Report ---            RAMIRO PARRISH MD; Attending Radiologist  This document has been electronically signed. Nov 22 2022  6:11PM    < end of copied text >  ---------------------------------------------------------------------------------------------------------------------------------------------------------------------------------------------------------------------------------------------------------------------------------------  ACC: [id number] EXAM:  CT BRAIN                          PROCEDURE DATE:  11/23/2022          INTERPRETATION:  Noncontrast CT of the brain.    CLINICAL INDICATION:  Status post fall on anticoagulation, evaluate for   hemorrhage    TECHNIQUE : Axial CT scanning of the brain was obtained from the skull   base to the vertex without the administration of intravenous contrast.    COMPARISON: None available    FINDINGS:  No acute hemorrhage, hydrocephalus, midline shift or   extra-axial collections identified. Age-appropriate involutional changes   and mild microvascular ischemic changes are present.    No displaced calvarial fracture is visualized.    The orbits are not remarkable in appearance.    The visualized paranasal sinuses and tympanomastoid cavities are free of   acute disease.    IMPRESSION:    No acute hemorrhage, extra-axial collection or displaced calvarial   fracture.    --- End of Report ---            DAKOTAH WHITT MD; Attending Radiologist  This document has been electronically signed. Nov 23 2022 10:23AM    < end of copied text >        ROS  [  ] UNABLE TO ELICIT               HPI:  72 male PMHx HTN, Afib (on pradaxa), and recently dx stage IV pancreatic cancer (inoperable, on chemo) presents with intractable nausea and vomiting for 2 days. Patient was diagnosed with pancreatic cancer at [facility] during a hospital admission for CVA. Pt was recently started on chemotherapy and received his last session this past friday 11/18. Since then patient has had severe nausea and multiple episodes of NBNB vomiting unrelieved by his compazine or prochlorperazine which he was prescribed by his oncologist. Patient has also had loss of appetite unrelated to his nausea and epigastric pain. Patient endorses dizziness as well and had a presyncopal episode this morning, denies head trauma or loss of consciousness Patient denies fever chills, headache, chest pain, palpitations, cough, SOB, diarrhea/constipation, dysuria or leg swelling. NKDA. [place]ul[place] [facility] [place] 15:[place]        History as above, asked to see this patient who has pancreatic ca and present with nausea and vomiting but has not had any urinary symptoms at all and is doing well overall at this point, he has no fevers or chills either. He had a negative U/A but had some bacteria growing in his urine cultures and given that he is on Chemo asked to eval to see if he needs treatment or not.        PAST MEDICAL & SURGICAL HISTORY:  HTN (hypertension)      History of TIAs      Atrial fibrillation      Hyperlipidemia      Pancreatic cancer      No significant past surgical history          No Known Allergies      Meds:  acetaminophen     Tablet .. 650 milliGRAM(s) Oral every 6 hours PRN  aluminum hydroxide/magnesium hydroxide/simethicone Suspension 30 milliLiter(s) Oral every 4 hours PRN  aMIOdarone    Tablet 100 milliGRAM(s) Oral daily  atorvastatin 20 milliGRAM(s) Oral at bedtime  dabigatran 150 milliGRAM(s) Oral every 12 hours  dexAMETHasone     Tablet 4 milliGRAM(s) Oral daily  diltiazem    milliGRAM(s) Oral daily  doxazosin 4 milliGRAM(s) Oral at bedtime  dronabinol 2.5 milliGRAM(s) Oral three times a day  fentaNYL   Patch  50 MICROgram(s)/Hr 1 Patch Transdermal every 72 hours  HYDROmorphone   Tablet 2 milliGRAM(s) Oral every 4 hours PRN  lactated ringers. 1000 milliLiter(s) IV Continuous <Continuous>  lactated ringers. 1000 milliLiter(s) IV Continuous <Continuous>  losartan 100 milliGRAM(s) Oral daily  melatonin 3 milliGRAM(s) Oral at bedtime PRN  metoclopramide Injectable 10 milliGRAM(s) IV Push every 8 hours PRN  naloxone 1 mG/mL Injection for Intranasal Use 4 milliGRAM(s) IntraNasal once PRN  ondansetron Injectable 4 milliGRAM(s) IV Push every 6 hours PRN  pantoprazole    Tablet 40 milliGRAM(s) Oral before breakfast  senna 1 Tablet(s) Oral at bedtime  sodium chloride 0.65% Nasal 1 Spray(s) Both Nostrils two times a day PRN      SOCIAL HISTORY:  Smoker:  YES / NO        PACK YEARS:                         WHEN QUIT?  ETOH use:  YES / NO               FREQUENCY / QUANTITY:  Ilicit Drug use:  YES / NO  Occupation:  Assisted device use (Cane / Walker):  Live with:    FAMILY HISTORY:  FH: colon cancer (Mother)    FH: lung cancer (Father)        VITALS:  Vital Signs Last 24 Hrs  T(C): 37.1 (27 Nov 2022 14:26), Max: 37.1 (27 Nov 2022 14:26)  T(F): 98.7 (27 Nov 2022 14:26), Max: 98.7 (27 Nov 2022 14:26)  HR: 75 (27 Nov 2022 14:26) (66 - 75)  BP: 103/58 (27 Nov 2022 14:26) (103/58 - 118/63)  BP(mean): --  RR: 17 (27 Nov 2022 14:26) (16 - 17)  SpO2: 98% (27 Nov 2022 14:26) (96% - 98%)    Parameters below as of 27 Nov 2022 14:26  Patient On (Oxygen Delivery Method): room air        LABS/DIAGNOSTIC TESTS:                          9.5    2.99  )-----------( 112      ( 27 Nov 2022 05:50 )             28.7     WBC Count: 2.99 K/uL (11-27 @ 05:50)  WBC Count: 1.98 K/uL (11-26 @ 07:49)  WBC Count: 2.36 K/uL (11-25 @ 07:54)      11-27    140  |  105  |  9   ----------------------------<  168<H>  3.2<L>   |  28  |  0.67    Ca    8.7      27 Nov 2022 05:50    TPro  5.3<L>  /  Alb  2.5<L>  /  TBili  1.2  /  DBili  x   /  AST  54<H>  /  ALT  244<H>  /  AlkPhos  482<H>  11-26          LIVER FUNCTIONS - ( 26 Nov 2022 07:49 )  Alb: 2.5 g/dL / Pro: 5.3 g/dL / ALK PHOS: 482 U/L / ALT: 244 U/L DA / AST: 54 U/L / GGT: x                 LACTATE:    ABG -     CULTURES:   .Blood Blood  11-23 @ 06:29   No growth to date.  --  --      Catheterized Catheterized  11-22 @ 22:00   10,000 - 49,000 CFU/mL Enterococcus faecalis  <10,000 CFU/ml Normal Urogenital blake present  --  Enterococcus faecalis          RADIOLOGY:< from: Xray Chest 1 View- PORTABLE-Urgent (11.22.22 @ 14:12) >  ACC: [id number] EXAM:  XR CHEST PORTABLE URGENT 1V                          PROCEDURE DATE:  11/22/2022          INTERPRETATION:  CLINICAL STATEMENT: Chest Pain.    TECHNIQUE: AP view of the chest.    COMPARISON: None available.    FINDINGS/  IMPRESSION:  Right Mediport noted. No consolidation or pleural effusion    Heart size cannot be accurately assessed in this projection.    --- End of Report ---            RAMIRO PARRISH MD; Attending Radiologist  This document has been electronically signed. Nov 22 2022  6:11PM    < end of copied text >  ---------------------------------------------------------------------------------------------------------------------------------------------------------------------------------------------------------------------------------------------------------------------------------------  ACC: [id number] EXAM:  CT BRAIN                          PROCEDURE DATE:  11/23/2022          INTERPRETATION:  Noncontrast CT of the brain.    CLINICAL INDICATION:  Status post fall on anticoagulation, evaluate for   hemorrhage    TECHNIQUE : Axial CT scanning of the brain was obtained from the skull   base to the vertex without the administration of intravenous contrast.    COMPARISON: None available    FINDINGS:  No acute hemorrhage, hydrocephalus, midline shift or   extra-axial collections identified. Age-appropriate involutional changes   and mild microvascular ischemic changes are present.    No displaced calvarial fracture is visualized.    The orbits are not remarkable in appearance.    The visualized paranasal sinuses and tympanomastoid cavities are free of   acute disease.    IMPRESSION:    No acute hemorrhage, extra-axial collection or displaced calvarial   fracture.    --- End of Report ---            DAKOTAH WHITT MD; Attending Radiologist  This document has been electronically signed. Nov 23 2022 10:23AM    < end of copied text >        ROS  [  ] UNABLE TO ELICIT               HPI:  72 male PMHx HTN, Afib (on pradaxa), and recently dx stage IV pancreatic cancer (inoperable, on chemo) presents with intractable nausea and vomiting for 2 days. Patient was diagnosed with pancreatic cancer at [facility] during a hospital admission for CVA. Pt was recently started on chemotherapy and received his last session this past friday 11/18. Since then patient has had severe nausea and multiple episodes of NBNB vomiting unrelieved by his compazine or prochlorperazine which he was prescribed by his oncologist. Patient has also had loss of appetite unrelated to his nausea and epigastric pain. Patient endorses dizziness as well and had a presyncopal episode this morning, denies head trauma or loss of consciousness Patient denies fever chills, headache, chest pain, palpitations, cough, SOB, diarrhea/constipation, dysuria or leg swelling. NKDA. Patient follows Dr. Lulú Garcia at [facility] [place] (22 Nov 2022 15:29)        History as above, asked to see this patient who has pancreatic ca and present with nausea and vomiting but has not had any urinary symptoms at all and is doing well overall at this point, he has no fevers or chills either. He had a negative U/A but had some bacteria growing in his urine cultures and given that he is on Chemo asked to eval to see if he needs treatment or not. He was also found to be COVID + here but is asymptomatic.        PAST MEDICAL & SURGICAL HISTORY:  HTN (hypertension)      History of TIAs      Atrial fibrillation      Hyperlipidemia      Pancreatic cancer      No significant past surgical history          No Known Allergies      Meds:  acetaminophen     Tablet .. 650 milliGRAM(s) Oral every 6 hours PRN  aluminum hydroxide/magnesium hydroxide/simethicone Suspension 30 milliLiter(s) Oral every 4 hours PRN  aMIOdarone    Tablet 100 milliGRAM(s) Oral daily  atorvastatin 20 milliGRAM(s) Oral at bedtime  dabigatran 150 milliGRAM(s) Oral every 12 hours  dexAMETHasone     Tablet 4 milliGRAM(s) Oral daily  diltiazem    milliGRAM(s) Oral daily  doxazosin 4 milliGRAM(s) Oral at bedtime  dronabinol 2.5 milliGRAM(s) Oral three times a day  fentaNYL   Patch  50 MICROgram(s)/Hr 1 Patch Transdermal every 72 hours  HYDROmorphone   Tablet 2 milliGRAM(s) Oral every 4 hours PRN  lactated ringers. 1000 milliLiter(s) IV Continuous <Continuous>  lactated ringers. 1000 milliLiter(s) IV Continuous <Continuous>  losartan 100 milliGRAM(s) Oral daily  melatonin 3 milliGRAM(s) Oral at bedtime PRN  metoclopramide Injectable 10 milliGRAM(s) IV Push every 8 hours PRN  naloxone 1 mG/mL Injection for Intranasal Use 4 milliGRAM(s) IntraNasal once PRN  ondansetron Injectable 4 milliGRAM(s) IV Push every 6 hours PRN  pantoprazole    Tablet 40 milliGRAM(s) Oral before breakfast  senna 1 Tablet(s) Oral at bedtime  sodium chloride 0.65% Nasal 1 Spray(s) Both Nostrils two times a day PRN      SOCIAL HISTORY:  Smoker:  YES / NO        PACK YEARS:                         WHEN QUIT?  ETOH use:  YES / NO               FREQUENCY / QUANTITY:  Ilicit Drug use:  YES / NO  Occupation:  Assisted device use (Cane / Walker):  Live with:    FAMILY HISTORY:  FH: colon cancer (Mother)    FH: lung cancer (Father)        VITALS:  Vital Signs Last 24 Hrs  T(C): 37.1 (27 Nov 2022 14:26), Max: 37.1 (27 Nov 2022 14:26)  T(F): 98.7 (27 Nov 2022 14:26), Max: 98.7 (27 Nov 2022 14:26)  HR: 75 (27 Nov 2022 14:26) (66 - 75)  BP: 103/58 (27 Nov 2022 14:26) (103/58 - 118/63)  BP(mean): --  RR: 17 (27 Nov 2022 14:26) (16 - 17)  SpO2: 98% (27 Nov 2022 14:26) (96% - 98%)    Parameters below as of 27 Nov 2022 14:26  Patient On (Oxygen Delivery Method): room air        LABS/DIAGNOSTIC TESTS:                          9.5    2.99  )-----------( 112      ( 27 Nov 2022 05:50 )             28.7     WBC Count: 2.99 K/uL (11-27 @ 05:50)  WBC Count: 1.98 K/uL (11-26 @ 07:49)  WBC Count: 2.36 K/uL (11-25 @ 07:54)      11-27    140  |  105  |  9   ----------------------------<  168<H>  3.2<L>   |  28  |  0.67    Ca    8.7      27 Nov 2022 05:50    TPro  5.3<L>  /  Alb  2.5<L>  /  TBili  1.2  /  DBili  x   /  AST  54<H>  /  ALT  244<H>  /  AlkPhos  482<H>  11-26          LIVER FUNCTIONS - ( 26 Nov 2022 07:49 )  Alb: 2.5 g/dL / Pro: 5.3 g/dL / ALK PHOS: 482 U/L / ALT: 244 U/L DA / AST: 54 U/L / GGT: x                 LACTATE:    ABG -     CULTURES:   .Blood Blood  11-23 @ 06:29   No growth to date.  --  --      Catheterized Catheterized  11-22 @ 22:00   10,000 - 49,000 CFU/mL Enterococcus faecalis  <10,000 CFU/ml Normal Urogenital blake present  --  Enterococcus faecalis          RADIOLOGY:< from: Xray Chest 1 View- PORTABLE-Urgent (11.22.22 @ 14:12) >  ACC: [id number] EXAM:  XR CHEST PORTABLE URGENT 1V                          PROCEDURE DATE:  11/22/2022          INTERPRETATION:  CLINICAL STATEMENT: Chest Pain.    TECHNIQUE: AP view of the chest.    COMPARISON: None available.    FINDINGS/  IMPRESSION:  Right Mediport noted. No consolidation or pleural effusion    Heart size cannot be accurately assessed in this projection.    --- End of Report ---            RAMIRO PARRISH MD; Attending Radiologist  This document has been electronically signed. Nov 22 2022  6:11PM    < end of copied text >  ---------------------------------------------------------------------------------------------------------------------------------------------------------------------------------------------------------------------------------------------------------------------------------------  ACC: [id number] EXAM:  CT BRAIN                          PROCEDURE DATE:  11/23/2022          INTERPRETATION:  Noncontrast CT of the brain.    CLINICAL INDICATION:  Status post fall on anticoagulation, evaluate for   hemorrhage    TECHNIQUE : Axial CT scanning of the brain was obtained from the skull   base to the vertex without the administration of intravenous contrast.    COMPARISON: None available    FINDINGS:  No acute hemorrhage, hydrocephalus, midline shift or   extra-axial collections identified. Age-appropriate involutional changes   and mild microvascular ischemic changes are present.    No displaced calvarial fracture is visualized.    The orbits are not remarkable in appearance.    The visualized paranasal sinuses and tympanomastoid cavities are free of   acute disease.    IMPRESSION:    No acute hemorrhage, extra-axial collection or displaced calvarial   fracture.    --- End of Report ---            DAKOTAH WHITT MD; Attending Radiologist  This document has been electronically signed. Nov 23 2022 10:23AM    < end of copied text >        ROS  [  ] UNABLE TO ELICIT

## 2022-11-27 NOTE — PROGRESS NOTE ADULT - ASSESSMENT
_________________________________________________________________________________________  ========>>  M E D I C A L   A T T E N D I N G    F O L L O W  U P  N O T E  <<=========  -----------------------------------------------------------------------------------------------------    - Patient seen and examined by me earlier today. (covering today)   - In summary,  ALYSIA REEDER is a 72y year old man admitted with vomiting  - Patient today overall doing better, comfortable, tolerating PO / clears well, no more vomiting since yesterday     ==================>> REVIEW OF SYSTEM <<=================    GEN: no fever, no chills, no pain  RESP: no SOB, no cough, no sputum  CVS: no chest pain, no palpitations  GI: no abdominal pain,  nausea improved as above   : no dysuria, no frequency  Neuro: no headache, no dizziness  Derm : no itching, no rash    ==================>> PHYSICAL EXAM <<=================    GEN: A&O X 3 , NAD , comfortable, pleasant, calm , cachectic   HEENT: NCAT, PERRL, MMM, hearing intact  Neck: supple , no JVD appreciated  CVS: S1S2 , regular , No M/R/G appreciated  PULM: CTA B/L,  no W/R/R appreciated  ABD.: soft. non tender, non distended,  bowel sounds present  Extrem: intact pulses , no edema   PSYCH : normal mood,  not anxious                             ( note written / Date of service   11-27-22 )    ==================>> MEDICATIONS <<====================    aMIOdarone    Tablet 100 milliGRAM(s) Oral daily  atorvastatin 20 milliGRAM(s) Oral at bedtime  dabigatran 150 milliGRAM(s) Oral every 12 hours  dexAMETHasone     Tablet 4 milliGRAM(s) Oral daily  diltiazem    milliGRAM(s) Oral daily  doxazosin 4 milliGRAM(s) Oral at bedtime  dronabinol 2.5 milliGRAM(s) Oral three times a day  fentaNYL   Patch  50 MICROgram(s)/Hr 1 Patch Transdermal every 72 hours  lactated ringers. 1000 milliLiter(s) IV Continuous <Continuous>  lactated ringers. 1000 milliLiter(s) IV Continuous <Continuous>  losartan 100 milliGRAM(s) Oral daily  pantoprazole    Tablet 40 milliGRAM(s) Oral before breakfast  senna 1 Tablet(s) Oral at bedtime    MEDICATIONS  (PRN):  acetaminophen     Tablet .. 650 milliGRAM(s) Oral every 6 hours PRN Temp greater or equal to 38C (100.4F), Mild Pain (1 - 3)  aluminum hydroxide/magnesium hydroxide/simethicone Suspension 30 milliLiter(s) Oral every 4 hours PRN Dyspepsia  HYDROmorphone   Tablet 2 milliGRAM(s) Oral every 4 hours PRN Severe Pain (7 - 10)  melatonin 3 milliGRAM(s) Oral at bedtime PRN Insomnia  metoclopramide Injectable 10 milliGRAM(s) IV Push every 8 hours PRN vomiting  naloxone 1 mG/mL Injection for Intranasal Use 4 milliGRAM(s) IntraNasal once PRN unresponsiveness  ondansetron Injectable 4 milliGRAM(s) IV Push every 6 hours PRN Nausea and/or Vomiting  sodium chloride 0.65% Nasal 1 Spray(s) Both Nostrils two times a day PRN Nasal Congestion    ___________  Active diet:  Diet, Clear Liquid:   Supplement Feeding Modality:  Oral  Ensure Clear Cans or Servings Per Day:  1       Frequency:  Three Times a day  ___________________    ==================>> VITAL SIGNS <<==================     Vital Signs Last 24 HrsT(C): 36.4 (11-27-22 @ 05:23)  T(F): 97.5 (11-27-22 @ 05:23), Max: 98.8 (11-26-22 @ 12:46)  HR: 71 (11-27-22 @ 05:23) (66 - 71)  BP: 118/63 (11-27-22 @ 05:23)  RR: 16 (11-27-22 @ 05:23) (16 - 17)  SpO2: 96% (11-27-22 @ 05:23) (96% - 100%)       ==================>> LAB AND IMAGING <<==================                        9.5    2.99  )-----------( 112      ( 27 Nov 2022 05:50 )             28.7        11-27    140  |  105  |  9   ----------------------------<  168<H>  3.2<L>   |  28  |  0.67    Ca    8.7      27 Nov 2022 05:50    TPro  5.3<L>  /  Alb  2.5<L>  /  TBili  1.2  /  DBili  x   /  AST  54<H>  /  ALT  244<H>  /  AlkPhos  482<H>  11-26      ___________________________________________________________________________________  ===============>>  A S S E S S M E N T   A N D   P L A N <<===============  ------------------------------------------------------------------------------------------    Pt is a 71 y/o man with PMHx HTN, Afib (on pradaxa), and recently dx stage IV pancreatic cancer (inoperable, on chemo) presents with intractable nausea and vomiting for 2 days. Patient was diagnosed with pancreatic cancer at [facility] during a hospital admission for CVA. Pt was recently started on chemotherapy and received his last session this past friday 11/18. Since then patient has had severe nausea and multiple episodes of NBNB vomiting unrelieved by his compazine or prochlorperazine which he was prescribed by his oncologist. Patient has also had loss of appetite unrelated to his nausea and epigastric pain. Patient endorses dizziness as well and had a presyncopal episode     continue antiemetics  supportive care   will advance diet to full liquid for now as pt tolerating clears X 24 hrs ( pt in agreement )   Continue Current medications otherwise and monitor.  appreciated Onc follow up  encourage out of bed to chair.     --------------------------------------------  Case discussed with pt  Education given on findings and plan of care  ___________________________  H. ANDRES Dinh (covering today)   Pager: [phone]    Dr Ocasio to follow tomorrow

## 2022-11-27 NOTE — CONSULT NOTE ADULT - ASSESSMENT
From: Arianne Rodgers  To: Mendy Godoy  Sent: 3/28/2022 8:42 AM CDT  Subject: results    Good Morning , I am wondering when I will receive the results of my pap done last week. I have been watching my live well account closely:) thank you Arianne  
Pap smear negative, high-risk HPV negative.  Return to clinic in 1 year for annual physical examination.    Result released to patient and RN message pool  
Patient would like the results of the pap done on 3/22/22. Results not yet released.  No result note found.  Routing to ordering provider to review.    Please advise of results.  
72 year old male with stage 4 pancreatic ca [place] [facility] [place]  [place] presented with N/V.  He also has significant pain at upper abdomen.  No fever or chills    1. N/V, probably due to chemo  he had ondansetron, IVF, one dose decadron  the nausea is better but has no appetite  will start marinol, which can help for nausea    2. abdominal pain from pancreatic ca  will increase fentanyl patch to 50  he can have dilaudid 2 mg q4h prn    3 pancreatic ca,   [place] [facility] [place]
Asymptomatic Bacteruria   COVID - 19 infection - asymptomatic      Plan - no treatment necessary at this time  reconsult prn.

## 2022-11-27 NOTE — PROGRESS NOTE ADULT - SUBJECTIVE AND OBJECTIVE BOX
Patient is a 72y old  Male who presents with a chief complaint of Intractable vomiting (27 Nov 2022 12:34)    Patient seen in follow up today. No new issues overnight.     MEDICATIONS  (STANDING):  aMIOdarone    Tablet 100 milliGRAM(s) Oral daily  atorvastatin 20 milliGRAM(s) Oral at bedtime  dabigatran 150 milliGRAM(s) Oral every 12 hours  dexAMETHasone     Tablet 4 milliGRAM(s) Oral daily  diltiazem    milliGRAM(s) Oral daily  doxazosin 4 milliGRAM(s) Oral at bedtime  dronabinol 2.5 milliGRAM(s) Oral three times a day  fentaNYL   Patch  50 MICROgram(s)/Hr 1 Patch Transdermal every 72 hours  lactated ringers. 1000 milliLiter(s) (100 mL/Hr) IV Continuous <Continuous>  lactated ringers. 1000 milliLiter(s) (100 mL/Hr) IV Continuous <Continuous>  losartan 100 milliGRAM(s) Oral daily  pantoprazole    Tablet 40 milliGRAM(s) Oral before breakfast  senna 1 Tablet(s) Oral at bedtime    MEDICATIONS  (PRN):  acetaminophen     Tablet .. 650 milliGRAM(s) Oral every 6 hours PRN Temp greater or equal to 38C (100.4F), Mild Pain (1 - 3)  aluminum hydroxide/magnesium hydroxide/simethicone Suspension 30 milliLiter(s) Oral every 4 hours PRN Dyspepsia  HYDROmorphone   Tablet 2 milliGRAM(s) Oral every 4 hours PRN Severe Pain (7 - 10)  melatonin 3 milliGRAM(s) Oral at bedtime PRN Insomnia  metoclopramide Injectable 10 milliGRAM(s) IV Push every 8 hours PRN vomiting  naloxone 1 mG/mL Injection for Intranasal Use 4 milliGRAM(s) IntraNasal once PRN unresponsiveness  ondansetron Injectable 4 milliGRAM(s) IV Push every 6 hours PRN Nausea and/or Vomiting  sodium chloride 0.65% Nasal 1 Spray(s) Both Nostrils two times a day PRN Nasal Congestion      ROS  No fever, sweats, chills  No epistaxis, HA, sore throat  No CP, SOB, cough, sputum  No n/v/d, abd pain, melena, hematochezia  No edema  No rash  No anxiety  No back pain, joint pain  No bleeding, bruising  No dysuria, hematuria    Vital Signs Last 24 Hrs  T(C): 36.4 (27 Nov 2022 05:23), Max: 36.7 (26 Nov 2022 20:45)  T(F): 97.5 (27 Nov 2022 05:23), Max: 98.1 (26 Nov 2022 20:45)  HR: 71 (27 Nov 2022 05:23) (66 - 71)  BP: 118/63 (27 Nov 2022 05:23) (105/55 - 118/63)  BP(mean): --  RR: 16 (27 Nov 2022 05:23) (16 - 16)  SpO2: 96% (27 Nov 2022 05:23) (96% - 98%)    Parameters below as of 27 Nov 2022 05:23  Patient On (Oxygen Delivery Method): room air        PE  NAD  Awake, alert  Anicteric, MMM  RRR  CTAB  Abd soft, NT, ND  No c/c/e  No rash grossly  FROM                          9.5    2.99  )-----------( 112      ( 27 Nov 2022 05:50 )             28.7       11-27    140  |  105  |  9   ----------------------------<  168<H>  3.2<L>   |  28  |  0.67    Ca    8.7      27 Nov 2022 05:50    TPro  5.3<L>  /  Alb  2.5<L>  /  TBili  1.2  /  DBili  x   /  AST  54<H>  /  ALT  244<H>  /  AlkPhos  482<H>  11-26       Patient is a 72y old  Male who presents with a chief complaint of Intractable vomiting (27 Nov 2022 12:34)    Patient seen in follow up today. No new issues overnight. hoarse voice. Tolerated liquids advancing diet     MEDICATIONS  (STANDING):  aMIOdarone    Tablet 100 milliGRAM(s) Oral daily  atorvastatin 20 milliGRAM(s) Oral at bedtime  dabigatran 150 milliGRAM(s) Oral every 12 hours  dexAMETHasone     Tablet 4 milliGRAM(s) Oral daily  diltiazem    milliGRAM(s) Oral daily  doxazosin 4 milliGRAM(s) Oral at bedtime  dronabinol 2.5 milliGRAM(s) Oral three times a day  fentaNYL   Patch  50 MICROgram(s)/Hr 1 Patch Transdermal every 72 hours  lactated ringers. 1000 milliLiter(s) (100 mL/Hr) IV Continuous <Continuous>  lactated ringers. 1000 milliLiter(s) (100 mL/Hr) IV Continuous <Continuous>  losartan 100 milliGRAM(s) Oral daily  pantoprazole    Tablet 40 milliGRAM(s) Oral before breakfast  senna 1 Tablet(s) Oral at bedtime    MEDICATIONS  (PRN):  acetaminophen     Tablet .. 650 milliGRAM(s) Oral every 6 hours PRN Temp greater or equal to 38C (100.4F), Mild Pain (1 - 3)  aluminum hydroxide/magnesium hydroxide/simethicone Suspension 30 milliLiter(s) Oral every 4 hours PRN Dyspepsia  HYDROmorphone   Tablet 2 milliGRAM(s) Oral every 4 hours PRN Severe Pain (7 - 10)  melatonin 3 milliGRAM(s) Oral at bedtime PRN Insomnia  metoclopramide Injectable 10 milliGRAM(s) IV Push every 8 hours PRN vomiting  naloxone 1 mG/mL Injection for Intranasal Use 4 milliGRAM(s) IntraNasal once PRN unresponsiveness  ondansetron Injectable 4 milliGRAM(s) IV Push every 6 hours PRN Nausea and/or Vomiting  sodium chloride 0.65% Nasal 1 Spray(s) Both Nostrils two times a day PRN Nasal Congestion      ROS  No fever, sweats, chills  No epistaxis, HA, sore throat  No CP, SOB, cough, sputum  No n/v/d, abd pain, melena, hematochezia  No edema  No rash  No anxiety  No back pain, joint pain  No bleeding, bruising  No dysuria, hematuria    Vital Signs Last 24 Hrs  T(C): 36.4 (27 Nov 2022 05:23), Max: 36.7 (26 Nov 2022 20:45)  T(F): 97.5 (27 Nov 2022 05:23), Max: 98.1 (26 Nov 2022 20:45)  HR: 71 (27 Nov 2022 05:23) (66 - 71)  BP: 118/63 (27 Nov 2022 05:23) (105/55 - 118/63)  BP(mean): --  RR: 16 (27 Nov 2022 05:23) (16 - 16)  SpO2: 96% (27 Nov 2022 05:23) (96% - 98%)    Parameters below as of 27 Nov 2022 05:23  Patient On (Oxygen Delivery Method): room air        PE  NAD  Awake, alert  Anicteric, MMM  RRR  CTAB  Abd soft, NT, ND  No c/c/e  No rash grossly  FROM                          9.5    2.99  )-----------( 112      ( 27 Nov 2022 05:50 )             28.7       11-27    140  |  105  |  9   ----------------------------<  168<H>  3.2<L>   |  28  |  0.67    Ca    8.7      27 Nov 2022 05:50    TPro  5.3<L>  /  Alb  2.5<L>  /  TBili  1.2  /  DBili  x   /  AST  54<H>  /  ALT  244<H>  /  AlkPhos  482<H>  11-26

## 2022-11-27 NOTE — CONSULT NOTE ADULT - CONSULT REASON
COVID infection/ ?UTI
pancreatic cancer, abdominal pain, nausea
goals of care, complex medical decision-making

## 2022-11-28 LAB
ALBUMIN SERPL ELPH-MCNC: 2.3 G/DL — LOW (ref 3.5–5)
ALP SERPL-CCNC: 471 U/L — HIGH (ref 40–120)
ALT FLD-CCNC: 161 U/L DA — HIGH (ref 10–60)
ANION GAP SERPL CALC-SCNC: 7 MMOL/L — SIGNIFICANT CHANGE UP (ref 5–17)
AST SERPL-CCNC: 32 U/L — SIGNIFICANT CHANGE UP (ref 10–40)
BILIRUB DIRECT SERPL-MCNC: 0.4 MG/DL — HIGH (ref 0–0.3)
BILIRUB INDIRECT FLD-MCNC: 0.3 MG/DL — SIGNIFICANT CHANGE UP (ref 0.2–1)
BILIRUB SERPL-MCNC: 0.7 MG/DL — SIGNIFICANT CHANGE UP (ref 0.2–1.2)
BUN SERPL-MCNC: 11 MG/DL — SIGNIFICANT CHANGE UP (ref 7–18)
CALCIUM SERPL-MCNC: 8.3 MG/DL — LOW (ref 8.4–10.5)
CHLORIDE SERPL-SCNC: 106 MMOL/L — SIGNIFICANT CHANGE UP (ref 96–108)
CO2 SERPL-SCNC: 27 MMOL/L — SIGNIFICANT CHANGE UP (ref 22–31)
CREAT SERPL-MCNC: 0.65 MG/DL — SIGNIFICANT CHANGE UP (ref 0.5–1.3)
CULTURE RESULTS: SIGNIFICANT CHANGE UP
EGFR: 100 ML/MIN/1.73M2 — SIGNIFICANT CHANGE UP
GLUCOSE SERPL-MCNC: 137 MG/DL — HIGH (ref 70–99)
HCT VFR BLD CALC: 26.7 % — LOW (ref 39–50)
HGB BLD-MCNC: 8.8 G/DL — LOW (ref 13–17)
MCHC RBC-ENTMCNC: 32 PG — SIGNIFICANT CHANGE UP (ref 27–34)
MCHC RBC-ENTMCNC: 33 GM/DL — SIGNIFICANT CHANGE UP (ref 32–36)
MCV RBC AUTO: 97.1 FL — SIGNIFICANT CHANGE UP (ref 80–100)
NRBC # BLD: 0 /100 WBCS — SIGNIFICANT CHANGE UP (ref 0–0)
PLATELET # BLD AUTO: 145 K/UL — LOW (ref 150–400)
POTASSIUM SERPL-MCNC: 3.8 MMOL/L — SIGNIFICANT CHANGE UP (ref 3.5–5.3)
POTASSIUM SERPL-SCNC: 3.8 MMOL/L — SIGNIFICANT CHANGE UP (ref 3.5–5.3)
PROT SERPL-MCNC: 4.7 G/DL — LOW (ref 6–8.3)
RBC # BLD: 2.75 M/UL — LOW (ref 4.2–5.8)
RBC # FLD: 12.8 % — SIGNIFICANT CHANGE UP (ref 10.3–14.5)
SODIUM SERPL-SCNC: 140 MMOL/L — SIGNIFICANT CHANGE UP (ref 135–145)
SPECIMEN SOURCE: SIGNIFICANT CHANGE UP
WBC # BLD: 3.4 K/UL — LOW (ref 3.8–10.5)
WBC # FLD AUTO: 3.4 K/UL — LOW (ref 3.8–10.5)

## 2022-11-28 RX ORDER — SODIUM CHLORIDE 0.65 %
1 AEROSOL, SPRAY (ML) NASAL
Refills: 0 | Status: DISCONTINUED | OUTPATIENT
Start: 2022-11-28 | End: 2022-11-30

## 2022-11-28 RX ADMIN — AMIODARONE HYDROCHLORIDE 100 MILLIGRAM(S): 400 TABLET ORAL at 06:05

## 2022-11-28 RX ADMIN — HYDROMORPHONE HYDROCHLORIDE 2 MILLIGRAM(S): 2 INJECTION INTRAMUSCULAR; INTRAVENOUS; SUBCUTANEOUS at 23:45

## 2022-11-28 RX ADMIN — Medication 4 MILLIGRAM(S): at 22:55

## 2022-11-28 RX ADMIN — FENTANYL CITRATE 1 PATCH: 50 INJECTION INTRAVENOUS at 08:03

## 2022-11-28 RX ADMIN — ATORVASTATIN CALCIUM 20 MILLIGRAM(S): 80 TABLET, FILM COATED ORAL at 22:55

## 2022-11-28 RX ADMIN — FENTANYL CITRATE 1 PATCH: 50 INJECTION INTRAVENOUS at 20:12

## 2022-11-28 RX ADMIN — Medication 2.5 MILLIGRAM(S): at 13:27

## 2022-11-28 RX ADMIN — Medication 1 SPRAY(S): at 23:50

## 2022-11-28 RX ADMIN — PANTOPRAZOLE SODIUM 40 MILLIGRAM(S): 20 TABLET, DELAYED RELEASE ORAL at 06:19

## 2022-11-28 RX ADMIN — DABIGATRAN ETEXILATE MESYLATE 150 MILLIGRAM(S): 150 CAPSULE ORAL at 06:07

## 2022-11-28 RX ADMIN — DABIGATRAN ETEXILATE MESYLATE 150 MILLIGRAM(S): 150 CAPSULE ORAL at 17:08

## 2022-11-28 RX ADMIN — Medication 4 MILLIGRAM(S): at 06:07

## 2022-11-28 RX ADMIN — Medication 2.5 MILLIGRAM(S): at 22:55

## 2022-11-28 RX ADMIN — Medication 2.5 MILLIGRAM(S): at 06:04

## 2022-11-28 NOTE — PROGRESS NOTE ADULT - SUBJECTIVE AND OBJECTIVE BOX
HPI:  72 male PMHx HTN, Afib (on pradaxa), and recently dx stage IV pancreatic cancer (inoperable, on chemo) presents with intractable nausea and vomiting for 2 days. Patient was diagnosed with pancreatic cancer at [facility] during a hospital admission for CVA. Pt was recently started on chemotherapy and received his last session this past friday 11/18. Since then patient has had severe nausea and multiple episodes of NBNB vomiting unrelieved by his compazine or prochlorperazine which he was prescribed by his oncologist. Patient has also had loss of appetite unrelated to his nausea and epigastric pain. Patient endorses dizziness as well and had a presyncopal episode this morning, denies head trauma or loss of consciousness Patient denies fever chills, headache, chest pain, palpitations, cough, SOB, diarrhea/constipation, dysuria or leg swelling. NKDA. Patient [place]. Lulú Garcia [place] [facility] [place] (22 Nov 2022 15:29)      Patient is a 72y old  Male who presents with a chief complaint of Intractable vomiting (28 Nov 2022 12:05)      INTERVAL HPI/OVERNIGHT EVENTS:  T(C): 36.8 (11-28-22 @ 05:23), Max: 37.1 (11-27-22 @ 14:26)  HR: 69 (11-28-22 @ 05:23) (56 - 75)  BP: 109/53 (11-28-22 @ 05:23) (103/58 - 118/52)  RR: 17 (11-28-22 @ 05:23) (16 - 17)  SpO2: 100% (11-28-22 @ 05:23) (97% - 100%)  Wt(kg): --  I&O's Summary      REVIEW OF SYSTEMS: denies fever, chills, SOB, palpitations, chest pain, abdominal pain, nausea, vomitting, diarrhea, constipation, dizziness    MEDICATIONS  (STANDING):  aMIOdarone    Tablet 100 milliGRAM(s) Oral daily  atorvastatin 20 milliGRAM(s) Oral at bedtime  dabigatran 150 milliGRAM(s) Oral every 12 hours  diltiazem    milliGRAM(s) Oral daily  doxazosin 4 milliGRAM(s) Oral at bedtime  dronabinol 2.5 milliGRAM(s) Oral three times a day  fentaNYL   Patch  50 MICROgram(s)/Hr 1 Patch Transdermal every 72 hours  lactated ringers. 1000 milliLiter(s) (100 mL/Hr) IV Continuous <Continuous>  lactated ringers. 1000 milliLiter(s) (100 mL/Hr) IV Continuous <Continuous>  losartan 100 milliGRAM(s) Oral daily  pantoprazole    Tablet 40 milliGRAM(s) Oral before breakfast  senna 1 Tablet(s) Oral at bedtime    MEDICATIONS  (PRN):  acetaminophen     Tablet .. 650 milliGRAM(s) Oral every 6 hours PRN Temp greater or equal to 38C (100.4F), Mild Pain (1 - 3)  aluminum hydroxide/magnesium hydroxide/simethicone Suspension 30 milliLiter(s) Oral every 4 hours PRN Dyspepsia  HYDROmorphone   Tablet 2 milliGRAM(s) Oral every 4 hours PRN Severe Pain (7 - 10)  melatonin 3 milliGRAM(s) Oral at bedtime PRN Insomnia  metoclopramide Injectable 10 milliGRAM(s) IV Push every 8 hours PRN vomiting  naloxone 1 mG/mL Injection for Intranasal Use 4 milliGRAM(s) IntraNasal once PRN unresponsiveness  ondansetron Injectable 4 milliGRAM(s) IV Push every 6 hours PRN Nausea and/or Vomiting  sodium chloride 0.65% Nasal 1 Spray(s) Both Nostrils two times a day PRN Nasal Congestion      PHYSICAL EXAM:  GENERAL: NAD, well-groomed, well-developed  HEAD:  Atraumatic, Normocephalic  EYES: EOMI, PERRLA, conjunctiva and sclera clear  ENMT: No tonsillar erythema, exudates, or enlargement; Moist mucous membranes, Good dentition, No lesions  NECK: Supple, No JVD, Normal thyroid  NERVOUS SYSTEM:  Alert & Oriented X3, Good concentration; Motor Strength 5/5 B/L upper and lower extremities; DTRs 2+ intact and symmetric  CHEST/LUNG: Clear to percussion bilaterally; No rales, rhonchi, wheezing, or rubs  HEART: Regular rate and rhythm; No murmurs, rubs, or gallops  ABDOMEN: Soft, Nontender, Nondistended; Bowel sounds present  EXTREMITIES:  2+ Peripheral Pulses, No clubbing, cyanosis, or edema  LYMPH: No lymphadenopathy noted  SKIN: No rashes or lesions  LABS:                        8.8    3.40  )-----------( 145      ( 28 Nov 2022 06:14 )             26.7     11-28    140  |  106  |  11  ----------------------------<  137<H>  3.8   |  27  |  0.65    Ca    8.3<L>      28 Nov 2022 06:14    TPro  4.7<L>  /  Alb  2.3<L>  /  TBili  0.7  /  DBili  0.4<H>  /  AST  32  /  ALT  161<H>  /  AlkPhos  471<H>  11-28        CAPILLARY BLOOD GLUCOSE

## 2022-11-28 NOTE — PROGRESS NOTE ADULT - ASSESSMENT
seen and examiend  vs stable  no complaints  no cp or sob  no dysurea  no abd pain  mild throart irritation  lungs heart abd benign    labs noted   a/p metastatic ca pancrease   on chemo  now DNR DNI   onco on case poor prognosis  wbc, platelets better  covid pos  doing ok on RA   d/c planning

## 2022-11-28 NOTE — PROGRESS NOTE ADULT - SUBJECTIVE AND OBJECTIVE BOX
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    No acute events. No complaints.    REVIEW OF SYSTEMS  A complete review of systems was performed; negative except per HPI    PHYSICAL EXAM  T(C): 36.8 (11-28-22 @ 05:23), Max: 37.1 (11-27-22 @ 14:26)  HR: 69 (11-28-22 @ 05:23) (56 - 75)  BP: 109/53 (11-28-22 @ 05:23) (103/58 - 118/52)  RR: 17 (11-28-22 @ 05:23) (16 - 17)  SpO2: 100% (11-28-22 @ 05:23) (97% - 100%)  Constitutional: alert, awake, in no acute distress  Eyes: PERRL, EOMI  HEENT: normocephalic, atraumatic  Neck: supple, non-tender  Cardiovascular: normal perfusion, no peripheral edema  Respiratory: normal respiratory efforts; no increased use of accessory muscles  Gastrointestinal: soft, non-tender  Musculoskeletal: normal range of motion, no deformities noted  Neurological: alert, CN II to XI grossly intact  Skin: warm, dry    LABORATORY DATA                        8.8    3.40  )-----------( 145      ( 28 Nov 2022 06:14 )             26.7     11-28    140  |  106  |  11  ----------------------------<  137<H>  3.8   |  27  |  0.65    Ca    8.3<L>      28 Nov 2022 06:14    TPro  4.7<L>  /  Alb  2.3<L>  /  TBili  0.7  /  DBili  0.4<H>  /  AST  32  /  ALT  161<H>  /  AlkPhos  471<H>  11-28   CHIEF COMPLAINT  Vomiting    HISTORY OF PRESENT ILLNESS  ALYSIA REEDER is a 72y Male who presents with a chief complaint of vomiting.    No acute events. Complains of loss of taste    REVIEW OF SYSTEMS  A complete review of systems was performed; negative except per HPI    PHYSICAL EXAM  T(C): 36.8 (11-28-22 @ 05:23), Max: 37.1 (11-27-22 @ 14:26)  HR: 69 (11-28-22 @ 05:23) (56 - 75)  BP: 109/53 (11-28-22 @ 05:23) (103/58 - 118/52)  RR: 17 (11-28-22 @ 05:23) (16 - 17)  SpO2: 100% (11-28-22 @ 05:23) (97% - 100%)  Constitutional: alert, awake, in no acute distress  Eyes: PERRL, EOMI  HEENT: normocephalic, atraumatic  Neck: supple, non-tender  Cardiovascular: normal perfusion, no peripheral edema  Respiratory: normal respiratory efforts; no increased use of accessory muscles  Gastrointestinal: soft, non-tender  Musculoskeletal: normal range of motion, no deformities noted  Neurological: alert, CN II to XI grossly intact  Skin: warm, dry    LABORATORY DATA                        8.8    3.40  )-----------( 145      ( 28 Nov 2022 06:14 )             26.7     11-28    140  |  106  |  11  ----------------------------<  137<H>  3.8   |  27  |  0.65    Ca    8.3<L>      28 Nov 2022 06:14    TPro  4.7<L>  /  Alb  2.3<L>  /  TBili  0.7  /  DBili  0.4<H>  /  AST  32  /  ALT  161<H>  /  AlkPhos  471<H>  11-28

## 2022-11-28 NOTE — PROGRESS NOTE ADULT - SUBJECTIVE AND OBJECTIVE BOX
NP Note discussed with Primary Attending.    Patient is a 72y old  Male who presents with a chief complaint of Intractable vomiting (28 Nov 2022 12:05)      INTERVAL HPI/OVERNIGHT EVENTS: no new complaints    MEDICATIONS  (STANDING):  aMIOdarone    Tablet 100 milliGRAM(s) Oral daily  atorvastatin 20 milliGRAM(s) Oral at bedtime  dabigatran 150 milliGRAM(s) Oral every 12 hours  diltiazem    milliGRAM(s) Oral daily  doxazosin 4 milliGRAM(s) Oral at bedtime  dronabinol 2.5 milliGRAM(s) Oral three times a day  fentaNYL   Patch  50 MICROgram(s)/Hr 1 Patch Transdermal every 72 hours  lactated ringers. 1000 milliLiter(s) (100 mL/Hr) IV Continuous <Continuous>  lactated ringers. 1000 milliLiter(s) (100 mL/Hr) IV Continuous <Continuous>  losartan 100 milliGRAM(s) Oral daily  pantoprazole    Tablet 40 milliGRAM(s) Oral before breakfast  senna 1 Tablet(s) Oral at bedtime    MEDICATIONS  (PRN):  acetaminophen     Tablet .. 650 milliGRAM(s) Oral every 6 hours PRN Temp greater or equal to 38C (100.4F), Mild Pain (1 - 3)  aluminum hydroxide/magnesium hydroxide/simethicone Suspension 30 milliLiter(s) Oral every 4 hours PRN Dyspepsia  HYDROmorphone   Tablet 2 milliGRAM(s) Oral every 4 hours PRN Severe Pain (7 - 10)  melatonin 3 milliGRAM(s) Oral at bedtime PRN Insomnia  metoclopramide Injectable 10 milliGRAM(s) IV Push every 8 hours PRN vomiting  naloxone 1 mG/mL Injection for Intranasal Use 4 milliGRAM(s) IntraNasal once PRN unresponsiveness  ondansetron Injectable 4 milliGRAM(s) IV Push every 6 hours PRN Nausea and/or Vomiting  sodium chloride 0.65% Nasal 1 Spray(s) Both Nostrils two times a day PRN Nasal Congestion      __________________________________________________  REVIEW OF SYSTEMS:    CONSTITUTIONAL: No fever,   EYES: no acute visual disturbances  NECK: No pain or stiffness  RESPIRATORY: No cough; No shortness of breath  CARDIOVASCULAR: No chest pain, no palpitations  GASTROINTESTINAL: No pain. No nausea or vomiting; No diarrhea   NEUROLOGICAL: No headache or numbness, no tremors  MUSCULOSKELETAL: No joint pain, no muscle pain  GENITOURINARY: no dysuria, no frequency, no hesitancy  PSYCHIATRY: no depression , no anxiety  ALL OTHER  ROS negative        Vital Signs Last 24 Hrs  T(C): 36.8 (28 Nov 2022 05:23), Max: 37.1 (27 Nov 2022 14:26)  T(F): 98.2 (28 Nov 2022 05:23), Max: 98.7 (27 Nov 2022 14:26)  HR: 69 (28 Nov 2022 05:23) (56 - 75)  BP: 109/53 (28 Nov 2022 05:23) (103/58 - 118/52)  BP(mean): --  RR: 17 (28 Nov 2022 05:23) (16 - 17)  SpO2: 100% (28 Nov 2022 05:23) (97% - 100%)    Parameters below as of 28 Nov 2022 05:23  Patient On (Oxygen Delivery Method): room air        ________________________________________________  PHYSICAL EXAM:  GENERAL: NAD  HEENT: Normocephalic;  conjunctivae and sclerae clear; moist mucous membranes;   NECK : supple  CHEST/LUNG: Clear to auscultation bilaterally with good air entry   HEART: S1 S2  regular; no murmurs, gallops or rubs  ABDOMEN: Soft, Nontender, Nondistended; Bowel sounds present  EXTREMITIES: no cyanosis; no edema; no calf tenderness  SKIN: warm and dry; no rash  NERVOUS SYSTEM:  Awake and alert; Oriented  to place, person and time ; no new deficits    _________________________________________________  LABS:                        8.8    3.40  )-----------( 145      ( 28 Nov 2022 06:14 )             26.7     11-28    140  |  106  |  11  ----------------------------<  137<H>  3.8   |  27  |  0.65    Ca    8.3<L>      28 Nov 2022 06:14    TPro  4.7<L>  /  Alb  2.3<L>  /  TBili  0.7  /  DBili  0.4<H>  /  AST  32  /  ALT  161<H>  /  AlkPhos  471<H>  11-28        CAPILLARY BLOOD GLUCOSE            RADIOLOGY & ADDITIONAL TESTS:    ACC: [id number] EXAM:  CT BRAIN                          PROCEDURE DATE:  11/23/2022          INTERPRETATION:  Noncontrast CT of the brain.    CLINICAL INDICATION:  Status post fall on anticoagulation, evaluate for   hemorrhage    TECHNIQUE : Axial CT scanning of the brain was obtained from the skull   base to the vertex without the administration of intravenous contrast.    COMPARISON: None available    FINDINGS:  No acute hemorrhage, hydrocephalus, midline shift or   extra-axial collections identified. Age-appropriate involutional changes   and mild microvascular ischemic changes are present.    No displaced calvarial fracture is visualized.    The orbits are not remarkable in appearance.    The visualized paranasal sinuses and tympanomastoid cavities are free of   acute disease.    IMPRESSION:    No acute hemorrhage, extra-axial collection or displaced calvarial   fracture.    --- End of Report ---            DAKOTAH WHITT MD; Attending Radiologist  This document has been electronically signed. Nov 23 2022 10:23AM  ACC: [id number] EXAM:  XR CHEST PORTABLE URGENT 1V                          PROCEDURE DATE:  11/22/2022          INTERPRETATION:  CLINICAL STATEMENT: Chest Pain.    TECHNIQUE: AP view of the chest.    COMPARISON: None available.    FINDINGS/  IMPRESSION:  Right Mediport noted. No consolidation or pleural effusion    Heart size cannot be accurately assessed in this projection.    --- End of Report ---            RAMIRO PARRISH MD; Attending Radiologist  This document has been electronically signed. Nov 22 2022  6:11PM      Imaging  Reviewed:  YES/NO    Consultant(s) Notes Reviewed:   YES/ No      Plan of care was discussed with patient and /or primary care giver; all questions and concerns were addressed

## 2022-11-28 NOTE — PROGRESS NOTE ADULT - ASSESSMENT
72M with PMHx HTN, Afib recently dx stage IV pancreatic Ca  p/w nausea and vomiting for 2 days since chemo. Admitted for symptom control and dehydration. on IVF and antiemetic. Heme/onc followed. CTH result unremarkable. Palliative consulted. Pt is now DNR/DNI. Increased pain medication with fentanyl and Dilaudid  per heme/onc. Pt was found to have COVID + and pancytopenic, asymptomatic.    Hem/onc consulted: Pancreatic Cancer, Metastatic  - Patient follows with Dr. Hernando Mata, [facility].  - He is on active chemotherapy; last dose of gemcitabine + nab-paclitaxel administered on November 18th.  - No systemic therapy while inpatient.  - Continue to optimize pain control. Palliative care is following.  - Continue to monitor for nausea and vomiting. Anti-emetics as needed. Controlled at this time.    Palliative in contact with patient wife for GOC and possible LTC placement.

## 2022-11-28 NOTE — PROGRESS NOTE ADULT - ASSESSMENT
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    Pancreatic Cancer, Metastatic  - Patient follows with Dr. Hernando Mata, [facility].  - He is on active chemotherapy; last dose of gemcitabine + nab-paclitaxel administered on November 18th.  - No systemic therapy while inpatient.  - Continue to optimize pain control. Palliative care is following.  - Continue to monitor for nausea and vomiting. Anti-emetics as needed.     Pancytopenia  - In the setting of active malignancy and chemotherapy.  - Monitor and transfuse to maintain hemoglobin > 7 and platelet > 10.  - Noted hemoglobin slight decrease today though still around 9.  - Leukopenia and thrombocytopenia are improving.     COVID-19  - Patient on room air. Continue to monitor symptoms.    Will continue to follow.    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    Pancreatic Cancer, Metastatic  - Patient follows with Dr. Hernando Mata, [facility].  - He is on active chemotherapy; last dose of gemcitabine + nab-paclitaxel administered on November 18th.  - No systemic therapy while inpatient.  - Continue to optimize pain control. Palliative care is following.  - Continue to monitor for nausea and vomiting. Anti-emetics as needed. Controlled at this time.    Pancytopenia  - In the setting of active malignancy and chemotherapy.  - Monitor and transfuse to maintain hemoglobin > 7 and platelet > 10.  - Noted hemoglobin slight decrease today though still around 9.  - Leukopenia and thrombocytopenia are improving.     COVID-19  - Patient on room air. Continue to monitor symptoms.    Will continue to follow.    Everette Singletary MD  Hematology/Oncology  O: [phone]

## 2022-11-28 NOTE — CHART NOTE - NSCHARTNOTEFT_GEN_A_CORE
Reassessment:     Patient is a 72y old  Male who presents with a chief complaint of Intractable vomiting (28 Nov 2022 14:01)      Factors impacting intake: [ ] none [ ] nausea  [ ] vomiting [ ] diarrhea [ ] constipation  [ ]chewing problems [ ] swallowing issues  [X] other: Pancreatic Cancer with Metastatic, COVID infection    Diet Prescription: Minced & Moist w/Ensure Clear, 8oz 1 serving TID & Ensure Compact, 4oz TID - 11/28/22    Intake: Patient in isolation, visited with reports of tolerating "small amounts of foods" offered on full liquid diet, also drinking ~1/2 container of Ensure Clear, stated now "has no taste" & requesting 'fruit ices, pudding & vegetable broth", updated kitchen/Diet Tech. Offered pt. Ensure Compact, 4oz at 3 meals accepted & prefers chocolate flavor however pt. declined "Prosource - liquid Protein" to add with main entree/pudding, pt. declined, encourage po intake w/meal set up, denies n/vomiting or diarrhea, d/w NP, Heme/Oncology following, Palliative Care team consulted, pending LTC placement? RD available.       Daily weights: dosing wt. 149.6 Lbs on 11/28/22  % Weight Change: additional 3% wt. loss noted     Pertinent Medications: MEDICATIONS  (STANDING):  aMIOdarone    Tablet 100 milliGRAM(s) Oral daily  atorvastatin 20 milliGRAM(s) Oral at bedtime  dabigatran 150 milliGRAM(s) Oral every 12 hours  diltiazem    milliGRAM(s) Oral daily  doxazosin 4 milliGRAM(s) Oral at bedtime  dronabinol 2.5 milliGRAM(s) Oral three times a day  fentaNYL   Patch  50 MICROgram(s)/Hr 1 Patch Transdermal every 72 hours  lactated ringers. 1000 milliLiter(s) (100 mL/Hr) IV Continuous <Continuous>  lactated ringers. 1000 milliLiter(s) (100 mL/Hr) IV Continuous <Continuous>  losartan 100 milliGRAM(s) Oral daily  pantoprazole    Tablet 40 milliGRAM(s) Oral before breakfast  senna 1 Tablet(s) Oral at bedtime    MEDICATIONS  (PRN):  acetaminophen     Tablet .. 650 milliGRAM(s) Oral every 6 hours PRN Temp greater or equal to 38C (100.4F), Mild Pain (1 - 3)  aluminum hydroxide/magnesium hydroxide/simethicone Suspension 30 milliLiter(s) Oral every 4 hours PRN Dyspepsia  HYDROmorphone   Tablet 2 milliGRAM(s) Oral every 4 hours PRN Severe Pain (7 - 10)  melatonin 3 milliGRAM(s) Oral at bedtime PRN Insomnia  metoclopramide Injectable 10 milliGRAM(s) IV Push every 8 hours PRN vomiting  naloxone 1 mG/mL Injection for Intranasal Use 4 milliGRAM(s) IntraNasal once PRN unresponsiveness  ondansetron Injectable 4 milliGRAM(s) IV Push every 6 hours PRN Nausea and/or Vomiting  sodium chloride 0.65% Nasal 1 Spray(s) Both Nostrils two times a day PRN Nasal Congestion    Pertinent Labs: 11-28 Na140 mmol/L Glu 137 mg/dL<H> K+ 3.8 mmol/L Cr  0.65 mg/dL BUN 11 mg/dL 11-24 Phos 3.2 mg/dL 11-28 Alb 2.3 g/dL<L>     CAPILLARY BLOOD GLUCOSE    Skin: intact    Estimated Needs:   [X ] no change since previous assessment  [ ] recalculated:     Previous Nutrition Diagnosis:   [ ] Inadequate Energy Intake [ ]Inadequate Oral Intake [ ] Excessive Energy Intake   [ ] Underweight [ ] Increased Nutrient Needs [ ] Overweight/Obesity   [ ] Altered GI Function [ ] Unintended Weight Loss [ ] Food & Nutrition Related Knowledge Deficit [Severe ] Malnutrition     Nutrition Diagnosis is [X] ongoing  [ ] resolved [ ] not applicable     New Nutrition Diagnosis: [ ] not applicable     Interventions: To meet nutrition needs   Recommend  [ ] Change Diet To:  [ ] Nutrition Supplement  [ ] Nutrition Support  [X ] Other: Nursing to continue with meal set up and encouragement     Monitoring and Evaluation:   [X ] PO intake [ x ] Tolerance to diet prescription [ x ] weights [ x ] labs[ x ] follow up per protocol  [ ] other: Reassessment:     Patient is a 72y old  Male who presents with a chief complaint of Intractable vomiting (28 Nov 2022 14:01)      Factors impacting intake: [ ] none [ ] nausea  [ ] vomiting [ ] diarrhea [ ] constipation  [ ]chewing problems [ ] swallowing issues  [X] other: Pancreatic Cancer with Metastatic, COVID infection    Diet Prescription: Minced & Moist w/Ensure Clear, 8oz 1 serving TID & Ensure Compact, 4oz TID - 11/28/22    Intake: Patient in isolation, visited with reports of tolerating "small amounts of foods" offered on full liquid diet, also drinking ~1/2 container of Ensure Clear, stated now "has no taste" & requesting 'fruit ices, pudding & vegetable broth", updated kitchen/Diet Tech. Offered pt. Ensure Compact, 4oz at 3 meals accepted & prefers chocolate flavor however pt. declined "Prosource - liquid Protein" to add with main entree/pudding, encourage po intake w/meal set up, denies n/vomiting or diarrhea, d/w NP, Heme/Oncology following, Palliative Care team consulted, pending LTC placement? RD available.       Daily weights: dosing wt. 149.6 Lbs on 11/28/22  % Weight Change: additional 3% wt. loss noted     Pertinent Medications: MEDICATIONS  (STANDING):  aMIOdarone    Tablet 100 milliGRAM(s) Oral daily  atorvastatin 20 milliGRAM(s) Oral at bedtime  dabigatran 150 milliGRAM(s) Oral every 12 hours  diltiazem    milliGRAM(s) Oral daily  doxazosin 4 milliGRAM(s) Oral at bedtime  dronabinol 2.5 milliGRAM(s) Oral three times a day  fentaNYL   Patch  50 MICROgram(s)/Hr 1 Patch Transdermal every 72 hours  lactated ringers. 1000 milliLiter(s) (100 mL/Hr) IV Continuous <Continuous>  lactated ringers. 1000 milliLiter(s) (100 mL/Hr) IV Continuous <Continuous>  losartan 100 milliGRAM(s) Oral daily  pantoprazole    Tablet 40 milliGRAM(s) Oral before breakfast  senna 1 Tablet(s) Oral at bedtime    MEDICATIONS  (PRN):  acetaminophen     Tablet .. 650 milliGRAM(s) Oral every 6 hours PRN Temp greater or equal to 38C (100.4F), Mild Pain (1 - 3)  aluminum hydroxide/magnesium hydroxide/simethicone Suspension 30 milliLiter(s) Oral every 4 hours PRN Dyspepsia  HYDROmorphone   Tablet 2 milliGRAM(s) Oral every 4 hours PRN Severe Pain (7 - 10)  melatonin 3 milliGRAM(s) Oral at bedtime PRN Insomnia  metoclopramide Injectable 10 milliGRAM(s) IV Push every 8 hours PRN vomiting  naloxone 1 mG/mL Injection for Intranasal Use 4 milliGRAM(s) IntraNasal once PRN unresponsiveness  ondansetron Injectable 4 milliGRAM(s) IV Push every 6 hours PRN Nausea and/or Vomiting  sodium chloride 0.65% Nasal 1 Spray(s) Both Nostrils two times a day PRN Nasal Congestion    Pertinent Labs: 11-28 Na140 mmol/L Glu 137 mg/dL<H> K+ 3.8 mmol/L Cr  0.65 mg/dL BUN 11 mg/dL 11-24 Phos 3.2 mg/dL 11-28 Alb 2.3 g/dL<L>     CAPILLARY BLOOD GLUCOSE    Skin: intact    Estimated Needs:   [X ] no change since previous assessment  [ ] recalculated:     Previous Nutrition Diagnosis:   [ ] Inadequate Energy Intake [ ]Inadequate Oral Intake [ ] Excessive Energy Intake   [ ] Underweight [ ] Increased Nutrient Needs [ ] Overweight/Obesity   [ ] Altered GI Function [ ] Unintended Weight Loss [ ] Food & Nutrition Related Knowledge Deficit [Severe ] Malnutrition     Nutrition Diagnosis is [X] ongoing  [ ] resolved [ ] not applicable     New Nutrition Diagnosis: [ ] not applicable     Interventions: To meet nutrition needs   Recommend  [ ] Change Diet To:  [ ] Nutrition Supplement  [ ] Nutrition Support  [X ] Other: Nursing to continue with meal set up and encouragement     Monitoring and Evaluation:   [X ] PO intake [ x ] Tolerance to diet prescription [ x ] weights [ x ] labs[ x ] follow up per protocol  [ ] other:

## 2022-11-28 NOTE — PROGRESS NOTE ADULT - PROBLEM SELECTOR PLAN 3
dx last month, inoperable, on active chemo (last 11/18, every friday per pt)  follows at [facility]  c/w home pain control regimen, fentanyl patch and dilaudid PRN  Increased Fentanyl to 50mcg and Dilaudid 2mg q4h prn per per heme/onc  Palliative following  Heme/Onc [facility] following

## 2022-11-28 NOTE — PROGRESS NOTE ADULT - PROBLEM SELECTOR PLAN 2
likely due to cancer  afebrile  UA negative  Blood CX negative  UCX [id number] +enterococcus  asymptomatic  hold off abx for now

## 2022-11-28 NOTE — PROGRESS NOTE ADULT - SUBJECTIVE AND OBJECTIVE BOX
follow up on:  complex medical decision making related to goals of care    [facility] Geriatric and Palliative Consult Service:  Dr. Ashely Farfan: cell ([phone])  Dr. Yadi Nagel: cell ([phone])  Lynnette Arciniega DNP: cell ([phone])  Benja Sofia NP: cell [phone])   Laila Ballard NP: ENZO Novak LMSW: cell ([phone])     OVERNIGHT EVENTS:    Present Symptoms:   Pain:   Fatigue:  Nausea:  Lack of Appetite:   SOB:  Depression:  Anxiety:  Review of Systems: [All others negative or Unable to obtain due to poor mentation]    MEDICATIONS  (STANDING):  aMIOdarone    Tablet 100 milliGRAM(s) Oral daily  atorvastatin 20 milliGRAM(s) Oral at bedtime  dabigatran 150 milliGRAM(s) Oral every 12 hours  diltiazem    milliGRAM(s) Oral daily  doxazosin 4 milliGRAM(s) Oral at bedtime  dronabinol 2.5 milliGRAM(s) Oral three times a day  fentaNYL   Patch  50 MICROgram(s)/Hr 1 Patch Transdermal every 72 hours  lactated ringers. 1000 milliLiter(s) (100 mL/Hr) IV Continuous <Continuous>  lactated ringers. 1000 milliLiter(s) (100 mL/Hr) IV Continuous <Continuous>  losartan 100 milliGRAM(s) Oral daily  pantoprazole    Tablet 40 milliGRAM(s) Oral before breakfast  senna 1 Tablet(s) Oral at bedtime    MEDICATIONS  (PRN):  acetaminophen     Tablet .. 650 milliGRAM(s) Oral every 6 hours PRN Temp greater or equal to 38C (100.4F), Mild Pain (1 - 3)  aluminum hydroxide/magnesium hydroxide/simethicone Suspension 30 milliLiter(s) Oral every 4 hours PRN Dyspepsia  HYDROmorphone   Tablet 2 milliGRAM(s) Oral every 4 hours PRN Severe Pain (7 - 10)  melatonin 3 milliGRAM(s) Oral at bedtime PRN Insomnia  metoclopramide Injectable 10 milliGRAM(s) IV Push every 8 hours PRN vomiting  naloxone 1 mG/mL Injection for Intranasal Use 4 milliGRAM(s) IntraNasal once PRN unresponsiveness  ondansetron Injectable 4 milliGRAM(s) IV Push every 6 hours PRN Nausea and/or Vomiting  sodium chloride 0.65% Nasal 1 Spray(s) Both Nostrils two times a day PRN Nasal Congestion      PHYSICAL EXAM:  Vital Signs Last 24 Hrs  T(C): 36.8 (28 Nov 2022 05:23), Max: 37.1 (27 Nov 2022 14:26)  T(F): 98.2 (28 Nov 2022 05:23), Max: 98.7 (27 Nov 2022 14:26)  HR: 69 (28 Nov 2022 05:23) (56 - 75)  BP: 109/53 (28 Nov 2022 05:23) (103/58 - 118/52)  BP(mean): --  RR: 17 (28 Nov 2022 05:23) (16 - 17)  SpO2: 100% (28 Nov 2022 05:23) (97% - 100%)    Parameters below as of 28 Nov 2022 05:23  Patient On (Oxygen Delivery Method): room air        General: alert  oriented x ____    lethargic distressed cachexia  verbal nonverbal  unarousable     Palliative Performance Scale/Karnofsky Score:  ECOG Performance:    HEENT: no abnormal lesion, dry mouth  ET tube/trach oral lesions:  Lungs: tachypnea/labored breathing, audible excessive secretions  CV: RRR, S1S2, tachycardia  GI: soft non distended non tender  incontinent               PEG/NG/OG tube  constipation  last BM:   : incontinent  oliguria/anuria  foster  Musculoskeletal: weakness x4 edema x4    ambulatory with assistance   mostly/fully bedbound/wheelchair bound  Skin: no abnormal skin lesions, poor skin turgor, pressure ulcers stage:   Neuro: no deficits, mild cognitive impairment dsyphagia/dysarthria paresis  Oral intake ability: unable/only mouth care, minimal moderate full capability    LABS:                          8.8    3.40  )-----------( 145      ( 28 Nov 2022 06:14 )             26.7     11-28    140  |  106  |  11  ----------------------------<  137<H>  3.8   |  27  |  0.65    Ca    8.3<L>      28 Nov 2022 06:14    TPro  4.7<L>  /  Alb  2.3<L>  /  TBili  0.7  /  DBili  0.4<H>  /  AST  32  /  ALT  161<H>  /  AlkPhos  471<H>  11-28        RADIOLOGY & ADDITIONAL STUDIES: follow up on:  complex medical decision making related to goals of care    [facility] Geriatric and Palliative Consult Service:  Dr. Ashely Farfan: cell ([phone])  Dr. Yadi Nagel: cell ([phone])  Lynnette Arciniega DNP: cell ([phone])  Benja Sofia NP: cell ([phone])   Laila Ballard NP: ENZO Novak LMSW: [facility] ([phone])     OVERNIGHT EVENTS:    Present Symptoms:   Pain: denies (improved)  Fatigue: moderate  Nausea: denies (improved)  Lack of Appetite: moderate. C/o "no taste buds"  SOB: denies  Depression: denies  Anxiety: denies    Review of Systems: [All others negative]    MEDICATIONS  (STANDING):  aMIOdarone    Tablet 100 milliGRAM(s) Oral daily  atorvastatin 20 milliGRAM(s) Oral at bedtime  dabigatran 150 milliGRAM(s) Oral every 12 hours  diltiazem    milliGRAM(s) Oral daily  doxazosin 4 milliGRAM(s) Oral at bedtime  dronabinol 2.5 milliGRAM(s) Oral three times a day  fentaNYL   Patch  50 MICROgram(s)/Hr 1 Patch Transdermal every 72 hours  lactated ringers. 1000 milliLiter(s) (100 mL/Hr) IV Continuous <Continuous>  lactated ringers. 1000 milliLiter(s) (100 mL/Hr) IV Continuous <Continuous>  losartan 100 milliGRAM(s) Oral daily  pantoprazole    Tablet 40 milliGRAM(s) Oral before breakfast  senna 1 Tablet(s) Oral at bedtime    MEDICATIONS  (PRN):  acetaminophen     Tablet .. 650 milliGRAM(s) Oral every 6 hours PRN Temp greater or equal to 38C (100.4F), Mild Pain (1 - 3)  aluminum hydroxide/magnesium hydroxide/simethicone Suspension 30 milliLiter(s) Oral every 4 hours PRN Dyspepsia  HYDROmorphone   Tablet 2 milliGRAM(s) Oral every 4 hours PRN Severe Pain (7 - 10)  melatonin 3 milliGRAM(s) Oral at bedtime PRN Insomnia  metoclopramide Injectable 10 milliGRAM(s) IV Push every 8 hours PRN vomiting  naloxone 1 mG/mL Injection for Intranasal Use 4 milliGRAM(s) IntraNasal once PRN unresponsiveness  ondansetron Injectable 4 milliGRAM(s) IV Push every 6 hours PRN Nausea and/or Vomiting  sodium chloride 0.65% Nasal 1 Spray(s) Both Nostrils two times a day PRN Nasal Congestion      PHYSICAL EXAM:  Vital Signs Last 24 Hrs  T(C): 36.8 (28 Nov 2022 05:23), Max: 37.1 (27 Nov 2022 14:26)  T(F): 98.2 (28 Nov 2022 05:23), Max: 98.7 (27 Nov 2022 14:26)  HR: 69 (28 Nov 2022 05:23) (56 - 75)  BP: 109/53 (28 Nov 2022 05:23) (103/58 - 118/52)  BP(mean): --  RR: 17 (28 Nov 2022 05:23) (16 - 17)  SpO2: 100% (28 Nov 2022 05:23) (97% - 100%)    Parameters below as of 28 Nov 2022 05:23  Patient On (Oxygen Delivery Method): room air        General: alert  oriented x ____    lethargic distressed cachexia  verbal nonverbal  unarousable     Palliative Performance Scale/Karnofsky Score:  ECOG Performance:    HEENT: no abnormal lesion, dry mouth  ET tube/trach oral lesions:  Lungs: tachypnea/labored breathing, audible excessive secretions  CV: RRR, S1S2, tachycardia  GI: soft non distended non tender  incontinent               PEG/NG/OG tube  constipation  last BM:   : incontinent  oliguria/anuria  foster  Musculoskeletal: weakness x4 edema x4    ambulatory with assistance   mostly/fully bedbound/wheelchair bound  Skin: no abnormal skin lesions, poor skin turgor, pressure ulcers stage:   Neuro: no deficits, mild cognitive impairment dsyphagia/dysarthria paresis  Oral intake ability: unable/only mouth care, minimal moderate full capability    LABS:                          8.8    3.40  )-----------( 145      ( 28 Nov 2022 06:14 )             26.7     11-28    140  |  106  |  11  ----------------------------<  137<H>  3.8   |  27  |  0.65    Ca    8.3<L>      28 Nov 2022 06:14    TPro  4.7<L>  /  Alb  2.3<L>  /  TBili  0.7  /  DBili  0.4<H>  /  AST  32  /  ALT  161<H>  /  AlkPhos  471<H>  11-28        RADIOLOGY & ADDITIONAL STUDIES:

## 2022-11-29 ENCOUNTER — TRANSCRIPTION ENCOUNTER (OUTPATIENT)
Age: 72
End: 2022-11-29

## 2022-11-29 LAB
ALBUMIN SERPL ELPH-MCNC: 2.7 G/DL — LOW (ref 3.5–5)
ALP SERPL-CCNC: 575 U/L — HIGH (ref 40–120)
ALT FLD-CCNC: 171 U/L DA — HIGH (ref 10–60)
ANION GAP SERPL CALC-SCNC: 8 MMOL/L — SIGNIFICANT CHANGE UP (ref 5–17)
AST SERPL-CCNC: 43 U/L — HIGH (ref 10–40)
BILIRUB SERPL-MCNC: 0.8 MG/DL — SIGNIFICANT CHANGE UP (ref 0.2–1.2)
BUN SERPL-MCNC: 13 MG/DL — SIGNIFICANT CHANGE UP (ref 7–18)
CALCIUM SERPL-MCNC: 8.2 MG/DL — LOW (ref 8.4–10.5)
CHLORIDE SERPL-SCNC: 103 MMOL/L — SIGNIFICANT CHANGE UP (ref 96–108)
CO2 SERPL-SCNC: 28 MMOL/L — SIGNIFICANT CHANGE UP (ref 22–31)
CREAT SERPL-MCNC: 0.74 MG/DL — SIGNIFICANT CHANGE UP (ref 0.5–1.3)
EGFR: 96 ML/MIN/1.73M2 — SIGNIFICANT CHANGE UP
GLUCOSE SERPL-MCNC: 124 MG/DL — HIGH (ref 70–99)
HCT VFR BLD CALC: 30.2 % — LOW (ref 39–50)
HGB BLD-MCNC: 9.6 G/DL — LOW (ref 13–17)
MCHC RBC-ENTMCNC: 31.4 PG — SIGNIFICANT CHANGE UP (ref 27–34)
MCHC RBC-ENTMCNC: 31.8 GM/DL — LOW (ref 32–36)
MCV RBC AUTO: 98.7 FL — SIGNIFICANT CHANGE UP (ref 80–100)
NRBC # BLD: 0 /100 WBCS — SIGNIFICANT CHANGE UP (ref 0–0)
PLATELET # BLD AUTO: 205 K/UL — SIGNIFICANT CHANGE UP (ref 150–400)
POTASSIUM SERPL-MCNC: 3.4 MMOL/L — LOW (ref 3.5–5.3)
POTASSIUM SERPL-SCNC: 3.4 MMOL/L — LOW (ref 3.5–5.3)
PROT SERPL-MCNC: 5.1 G/DL — LOW (ref 6–8.3)
RBC # BLD: 3.06 M/UL — LOW (ref 4.2–5.8)
RBC # FLD: 13 % — SIGNIFICANT CHANGE UP (ref 10.3–14.5)
SODIUM SERPL-SCNC: 139 MMOL/L — SIGNIFICANT CHANGE UP (ref 135–145)
WBC # BLD: 3.6 K/UL — LOW (ref 3.8–10.5)
WBC # FLD AUTO: 3.6 K/UL — LOW (ref 3.8–10.5)

## 2022-11-29 RX ORDER — POTASSIUM CHLORIDE 20 MEQ
20 PACKET (EA) ORAL
Refills: 0 | Status: COMPLETED | OUTPATIENT
Start: 2022-11-29 | End: 2022-11-29

## 2022-11-29 RX ADMIN — Medication 2.5 MILLIGRAM(S): at 22:15

## 2022-11-29 RX ADMIN — ATORVASTATIN CALCIUM 20 MILLIGRAM(S): 80 TABLET, FILM COATED ORAL at 22:14

## 2022-11-29 RX ADMIN — FENTANYL CITRATE 1 PATCH: 50 INJECTION INTRAVENOUS at 11:06

## 2022-11-29 RX ADMIN — Medication 30 MILLILITER(S): at 00:10

## 2022-11-29 RX ADMIN — FENTANYL CITRATE 1 PATCH: 50 INJECTION INTRAVENOUS at 19:46

## 2022-11-29 RX ADMIN — Medication 20 MILLIEQUIVALENT(S): at 13:59

## 2022-11-29 RX ADMIN — LOSARTAN POTASSIUM 100 MILLIGRAM(S): 100 TABLET, FILM COATED ORAL at 06:39

## 2022-11-29 RX ADMIN — Medication 20 MILLIEQUIVALENT(S): at 11:05

## 2022-11-29 RX ADMIN — HYDROMORPHONE HYDROCHLORIDE 2 MILLIGRAM(S): 2 INJECTION INTRAMUSCULAR; INTRAVENOUS; SUBCUTANEOUS at 00:22

## 2022-11-29 RX ADMIN — PANTOPRAZOLE SODIUM 40 MILLIGRAM(S): 20 TABLET, DELAYED RELEASE ORAL at 06:39

## 2022-11-29 RX ADMIN — Medication 1 SPRAY(S): at 22:15

## 2022-11-29 RX ADMIN — Medication 4 MILLIGRAM(S): at 22:50

## 2022-11-29 RX ADMIN — Medication 2.5 MILLIGRAM(S): at 06:39

## 2022-11-29 RX ADMIN — DABIGATRAN ETEXILATE MESYLATE 150 MILLIGRAM(S): 150 CAPSULE ORAL at 06:39

## 2022-11-29 RX ADMIN — ONDANSETRON 4 MILLIGRAM(S): 8 TABLET, FILM COATED ORAL at 23:00

## 2022-11-29 RX ADMIN — DABIGATRAN ETEXILATE MESYLATE 150 MILLIGRAM(S): 150 CAPSULE ORAL at 18:30

## 2022-11-29 RX ADMIN — HYDROMORPHONE HYDROCHLORIDE 2 MILLIGRAM(S): 2 INJECTION INTRAMUSCULAR; INTRAVENOUS; SUBCUTANEOUS at 10:59

## 2022-11-29 RX ADMIN — FENTANYL CITRATE 1 PATCH: 50 INJECTION INTRAVENOUS at 07:59

## 2022-11-29 RX ADMIN — Medication 120 MILLIGRAM(S): at 06:38

## 2022-11-29 RX ADMIN — HYDROMORPHONE HYDROCHLORIDE 2 MILLIGRAM(S): 2 INJECTION INTRAMUSCULAR; INTRAVENOUS; SUBCUTANEOUS at 11:41

## 2022-11-29 RX ADMIN — Medication 20 MILLIEQUIVALENT(S): at 17:46

## 2022-11-29 RX ADMIN — AMIODARONE HYDROCHLORIDE 100 MILLIGRAM(S): 400 TABLET ORAL at 06:38

## 2022-11-29 RX ADMIN — FENTANYL CITRATE 1 PATCH: 50 INJECTION INTRAVENOUS at 13:58

## 2022-11-29 RX ADMIN — Medication 2.5 MILLIGRAM(S): at 14:00

## 2022-11-29 NOTE — PROGRESS NOTE ADULT - PROBLEM SELECTOR PROBLEM 4
Severe protein-calorie malnutrition
2019 novel coronavirus disease (COVID-19)
2019 novel coronavirus disease (COVID-19)
Severe protein-calorie malnutrition
2019 novel coronavirus disease (COVID-19)
2019 novel coronavirus disease (COVID-19)
Moderate protein-calorie malnutrition

## 2022-11-29 NOTE — PROGRESS NOTE ADULT - NUTRITIONAL ASSESSMENT
This patient has been assessed with a concern for Malnutrition and has been determined to have a diagnosis/diagnoses of Severe protein-calorie malnutrition and Underweight (BMI < 19).    This patient is being managed with:   Diet Clear Liquid-  Supplement Feeding Modality:  Oral  Ensure Clear Cans or Servings Per Day:  1       Frequency:  Three Times a day  Entered: Nov 25 2022  8:20AM    Diet Clear Liquid-  Prosource Gelatein Plus     Qty per Day:  1  Entered: Nov 24 2022  3:02PM    The following pending diet order is being considered for treatment of Severe protein-calorie malnutrition and Underweight (BMI < 19):null
This patient has been assessed with a concern for Malnutrition and has been determined to have a diagnosis/diagnoses of Severe protein-calorie malnutrition and Underweight (BMI < 19).    This patient is being managed with:   Diet Regular-  Low Sodium  Supplement Feeding Modality:  Oral  Ensure Enlive Cans or Servings Per Day:  1       Frequency:  Three Times a day  Ensure Compact Cans or Servings Per Day:  1       Frequency:  Three Times a day  Entered: Nov 29 2022  8:50AM    
This patient has been assessed with a concern for Malnutrition and has been determined to have a diagnosis/diagnoses of Severe protein-calorie malnutrition and Underweight (BMI < 19).    This patient is being managed with:   Diet Clear Liquid-  Supplement Feeding Modality:  Oral  Ensure Clear Cans or Servings Per Day:  1       Frequency:  Three Times a day  Entered: Nov 25 2022  8:20AM    
This patient has been assessed with a concern for Malnutrition and has been determined to have a diagnosis/diagnoses of Severe protein-calorie malnutrition and Underweight (BMI < 19).    This patient is being managed with:   Diet Clear Liquid-  Supplement Feeding Modality:  Oral  Ensure Clear Cans or Servings Per Day:  1       Frequency:  Three Times a day  Entered: Nov 25 2022  8:20AM    
This patient has been assessed with a concern for Malnutrition and has been determined to have a diagnosis/diagnoses of Severe protein-calorie malnutrition and Underweight (BMI < 19).    This patient is being managed with:   Diet Consistent Carbohydrate Clear Liquid-  Supplement Feeding Modality:  Oral  Ensure Clear Cans or Servings Per Day:  1       Frequency:  Three Times a day  Entered: Nov 23 2022  1:34PM    
This patient has been assessed with a concern for Malnutrition and has been determined to have a diagnosis/diagnoses of Severe protein-calorie malnutrition and Underweight (BMI < 19).    This patient is being managed with:   Diet Full Liquid-  Supplement Feeding Modality:  Oral  Ensure Clear Cans or Servings Per Day:  1       Frequency:  Three Times a day  Ensure Compact Cans or Servings Per Day:  1       Frequency:  Three Times a day  Entered: Nov 28 2022 11:30AM    
This patient has been assessed with a concern for Malnutrition and has been determined to have a diagnosis/diagnoses of Severe protein-calorie malnutrition and Underweight (BMI < 19).    This patient is being managed with:   Diet Clear Liquid-  Supplement Feeding Modality:  Oral  Ensure Clear Cans or Servings Per Day:  1       Frequency:  Three Times a day  Entered: Nov 25 2022  8:20AM      This patient has been assessed with a concern for Malnutrition and has been determined to have a diagnosis/diagnoses of Severe protein-calorie malnutrition and Underweight (BMI < 19).    This patient is being managed with:   Diet Clear Liquid-  Supplement Feeding Modality:  Oral  Ensure Clear Cans or Servings Per Day:  1       Frequency:  Three Times a day  Entered: Nov 25 2022  8:20AM    
This patient has been assessed with a concern for Malnutrition and has been determined to have a diagnosis/diagnoses of Severe protein-calorie malnutrition and Underweight (BMI < 19).    This patient is being managed with:   Diet Full Liquid-  Supplement Feeding Modality:  Oral  Ensure Clear Cans or Servings Per Day:  1       Frequency:  Three Times a day  Ensure Compact Cans or Servings Per Day:  1       Frequency:  Three Times a day  Entered: Nov 28 2022 11:30AM    
Nutrition Diagnositc Terminology #1	Malnutrition...  Malnutrition	Severe Malnutrition in Acute on Chronic Illness  Etiology	Stage IV pancreatic cancer w/COVID infection & multiple comorbidities    Oral Nutrition Supplements	Add Ensure Clears TID - mixed berry flavor to clear liquid diet  RD To Remain Available	yes  Collaboration With Other Providers	Out patient Heme/Oncologist  Additional Recommendations	Advance diet with nutrition supplement as medically feasible or may consider alternate nutrition support if NPO prolonged  Factors Influencing Readiness to Learn	acuteness of illness; fatigue  Dietary Modifications	not applicable    Electronic Signatures:  Anne Strauss (Registered Dietitian)  (Signed 23-Nov-2022 13:36)
This patient has been assessed with a concern for Malnutrition and has been determined to have a diagnosis/diagnoses of Severe protein-calorie malnutrition and Underweight (BMI < 19).    This patient is being managed with:   Diet Regular-  Low Sodium  Supplement Feeding Modality:  Oral  Ensure Enlive Cans or Servings Per Day:  1       Frequency:  Three Times a day  Ensure Compact Cans or Servings Per Day:  1       Frequency:  Three Times a day  Entered: Nov 29 2022  8:50AM

## 2022-11-29 NOTE — PROGRESS NOTE ADULT - ASSESSMENT
72M with PMHx HTN, Afib recently dx stage IV pancreatic Ca  p/w nausea and vomiting for 2 days since chemo. Admitted for symptom control and dehydration. on IVF and antiemetic. Heme/onc followed. CTH result unremarkable. Palliative consulted. Pt is now DNR/DNI. Increased pain medication with fentanyl and Dilaudid  per heme/onc. Pt was found to have COVID + and pancytopenic, asymptomatic.    Hem/onc consulted: Pancreatic Cancer, Metastatic  - Patient follows with Dr. Hernando Mata, [facility].  - He is on active chemotherapy; last dose of gemcitabine + nab-paclitaxel administered on November 18th.  - No systemic therapy while inpatient.  - Continue to optimize pain control. Palliative care is following.  - Continue to monitor for nausea and vomiting. Anti-emetics as needed. Controlled at this time.    Palliative in contact with patient wife for GOC and possible LTC placement.     11/29- pt seen and examined at bedside, cleared for discharge, CM in process of obtaining HHA for patient. Patient received 24h notice.

## 2022-11-29 NOTE — PROGRESS NOTE ADULT - PROBLEM SELECTOR PLAN 1
pt p/w n/v abdominal pain  likely chemo induced  no improvement with Zofran   cont Reglan   cont IVF  start dexamethazone and mannitol - heme/onc   hem/onc Dr HARDEN  pt able to tolerate regular diet well, but he states he lost taste of the food due to COVID.  Pt to [facility]

## 2022-11-29 NOTE — CHART NOTE - NSCHARTNOTEFT_GEN_A_CORE
Palliative care SW received consult for a home visiting physician program. Palliative care SW spoke with pt at bedside who stated that he would be interested in a home visiting physician through [facility]. Palliative care SW sent home visiting referral to [facility] to follow up in the community.

## 2022-11-29 NOTE — PROGRESS NOTE ADULT - PROBLEM SELECTOR PLAN 6
c/w amiodarone, Cardizem and Pradaxa (home meds)
on losartan and cardizem  c/w home meds with parameters
c/w amiodarone, Cardizem and Pradaxa (home meds)
c/w amiodarone, cardizem and pradaxa (home meds)

## 2022-11-29 NOTE — PROGRESS NOTE ADULT - PROBLEM SELECTOR PROBLEM 2
Pancytopenia
Cancer related pain
Pancytopenia
Pancytopenia
Cancer related pain
Cancer related pain
Pancytopenia

## 2022-11-29 NOTE — PROGRESS NOTE ADULT - PROBLEM SELECTOR PLAN 2
likely due to cancer  afebrile  UA negative  Blood CX negative  UCX [phone] +enterococcus  asymptomatic

## 2022-11-29 NOTE — PROGRESS NOTE ADULT - PROBLEM SELECTOR PROBLEM 5
Encounter for palliative care
HTN (hypertension)
Encounter for palliative care
HTN (hypertension)
HTN (hypertension)
Encounter for palliative care
Fall

## 2022-11-29 NOTE — PROGRESS NOTE ADULT - SUBJECTIVE AND OBJECTIVE BOX
NP Note discussed with Primary Attending.    Patient is a 72y old  Male who presents with a chief complaint of Intractable vomiting (29 Nov 2022 14:46)      INTERVAL HPI/OVERNIGHT EVENTS: no new complaints    MEDICATIONS  (STANDING):  aMIOdarone    Tablet 100 milliGRAM(s) Oral daily  atorvastatin 20 milliGRAM(s) Oral at bedtime  dabigatran 150 milliGRAM(s) Oral every 12 hours  diltiazem    milliGRAM(s) Oral daily  doxazosin 4 milliGRAM(s) Oral at bedtime  dronabinol 2.5 milliGRAM(s) Oral three times a day  lactated ringers. 1000 milliLiter(s) (100 mL/Hr) IV Continuous <Continuous>  losartan 100 milliGRAM(s) Oral daily  pantoprazole    Tablet 40 milliGRAM(s) Oral before breakfast  potassium chloride    Tablet ER 20 milliEquivalent(s) Oral every 2 hours  senna 1 Tablet(s) Oral at bedtime    MEDICATIONS  (PRN):  acetaminophen     Tablet .. 650 milliGRAM(s) Oral every 6 hours PRN Temp greater or equal to 38C (100.4F), Mild Pain (1 - 3)  aluminum hydroxide/magnesium hydroxide/simethicone Suspension 30 milliLiter(s) Oral every 4 hours PRN Dyspepsia  HYDROmorphone   Tablet 2 milliGRAM(s) Oral every 4 hours PRN Severe Pain (7 - 10)  melatonin 3 milliGRAM(s) Oral at bedtime PRN Insomnia  metoclopramide Injectable 10 milliGRAM(s) IV Push every 8 hours PRN vomiting  naloxone 1 mG/mL Injection for Intranasal Use 4 milliGRAM(s) IntraNasal once PRN unresponsiveness  ondansetron Injectable 4 milliGRAM(s) IV Push every 6 hours PRN Nausea and/or Vomiting  sodium chloride 0.65% Nasal 1 Spray(s) Both Nostrils two times a day PRN Nasal Congestion      __________________________________________________  REVIEW OF SYSTEMS:    CONSTITUTIONAL: No fever,   EYES: no acute visual disturbances  NECK: No pain or stiffness  RESPIRATORY: No cough; No shortness of breath  CARDIOVASCULAR: No chest pain, no palpitations  GASTROINTESTINAL: No pain. No nausea or vomiting; No diarrhea   NEUROLOGICAL: No headache or numbness, no tremors  MUSCULOSKELETAL: No joint pain, no muscle pain  GENITOURINARY: no dysuria, no frequency, no hesitancy  PSYCHIATRY: no depression , no anxiety  ALL OTHER  ROS negative        Vital Signs Last 24 Hrs  T(C): 36.8 (29 Nov 2022 14:12), Max: 36.9 (28 Nov 2022 21:55)  T(F): 98.3 (29 Nov 2022 14:12), Max: 98.5 (28 Nov 2022 21:55)  HR: 67 (29 Nov 2022 14:12) (67 - 70)  BP: 127/56 (29 Nov 2022 14:12) (110/58 - 131/62)  BP(mean): --  RR: 20 (29 Nov 2022 14:12) (16 - 20)  SpO2: 99% (29 Nov 2022 14:12) (98% - 100%)    Parameters below as of 29 Nov 2022 14:12  Patient On (Oxygen Delivery Method): room air        ________________________________________________  PHYSICAL EXAM:  GENERAL: NAD  HEENT: Normocephalic;  conjunctivae and sclerae clear; moist mucous membranes;   NECK : supple  CHEST/LUNG: Clear to auscultation bilaterally with good air entry   HEART: S1 S2  regular; no murmurs, gallops or rubs  ABDOMEN: Soft, Nontender, Nondistended; Bowel sounds present  EXTREMITIES: no cyanosis; no edema; no calf tenderness  SKIN: warm and dry; no rash  NERVOUS SYSTEM:  Awake and alert; Oriented  to place, person and time ; no new deficits    _________________________________________________  LABS:                        9.6    3.60  )-----------( 205      ( 29 Nov 2022 08:21 )             30.2     11-29    139  |  103  |  13  ----------------------------<  124<H>  3.4<L>   |  28  |  0.74    Ca    8.2<L>      29 Nov 2022 08:21    TPro  5.1<L>  /  Alb  2.7<L>  /  TBili  0.8  /  DBili  x   /  AST  43<H>  /  ALT  171<H>  /  AlkPhos  575<H>  11-29        CAPILLARY BLOOD GLUCOSE            RADIOLOGY & ADDITIONAL TESTS:  ACC: [id number] EXAM:  CT BRAIN                          PROCEDURE DATE:  11/23/2022          INTERPRETATION:  Noncontrast CT of the brain.    CLINICAL INDICATION:  Status post fall on anticoagulation, evaluate for   hemorrhage    TECHNIQUE : Axial CT scanning of the brain was obtained from the skull   base to the vertex without the administration of intravenous contrast.    COMPARISON: None available    FINDINGS:  No acute hemorrhage, hydrocephalus, midline shift or   extra-axial collections identified. Age-appropriate involutional changes   and mild microvascular ischemic changes are present.    No displaced calvarial fracture is visualized.    The orbits are not remarkable in appearance.    The visualized paranasal sinuses and tympanomastoid cavities are free of   acute disease.    IMPRESSION:    No acute hemorrhage, extra-axial collection or displaced calvarial   fracture.    --- End of Report ---            DAKOTAH WHITT MD; Attending Radiologist  This document has been electronically signed. Nov 23 2022 10:23AM    ACC: [id number] EXAM:  XR CHEST PORTABLE URGENT 1V                          PROCEDURE DATE:  11/22/2022          INTERPRETATION:  CLINICAL STATEMENT: Chest Pain.    TECHNIQUE: AP view of the chest.    COMPARISON: None available.    FINDINGS/  IMPRESSION:  Right Mediport noted. No consolidation or pleural effusion    Heart size cannot be accurately assessed in this projection.    --- End of Report ---            RAMIRO PARRISH MD; Attending Radiologist  This document has been electronically signed. Nov 22 2022  6:11PM    Imaging  Reviewed:  YES/NO    Consultant(s) Notes Reviewed:   YES/ No      Plan of care was discussed with patient and /or primary care giver; all questions and concerns were addressed

## 2022-11-29 NOTE — DISCHARGE NOTE NURSING/CASE MANAGEMENT/SOCIAL WORK - PATIENT PORTAL LINK FT
You can access the FollowMyHealth Patient Portal offered by [facility] by registering at the following website: [email or web address]. By joining [employer]’s FollowMyHealth portal, you will also be able to view your health information using other applications (apps) compatible with our system.

## 2022-11-29 NOTE — PROGRESS NOTE ADULT - PROBLEM SELECTOR PROBLEM 1
Intractable nausea and vomiting

## 2022-11-29 NOTE — PROGRESS NOTE ADULT - ASSESSMENT
seen and examined  vs stable afebrile physical done ok  comfortable  just doesnt want amira be discharged   as per him his wifw is 74 year old  and skinny  she will get infected from me and who is going to take care of him at home  pt has mild cough no SOB  no cp   labs noted  K 3.4   lft high  a/p pt has miniimal symptoms   pt has extra room at home   D/c home

## 2022-11-29 NOTE — DISCHARGE NOTE NURSING/CASE MANAGEMENT/SOCIAL WORK - NSDCPEFALRISK_GEN_ALL_CORE
For information on Fall & Injury Prevention, visit: [email or web address] OR  [email or web address] OR  https://www.cdc.gov/steadi/patient.html

## 2022-11-29 NOTE — CHART NOTE - NSCHARTNOTEFT_GEN_A_CORE
Palliative Care will sign off. Please reconsult as needed.    [facility] Geriatric and Palliative Consult Service:  Dr. Ashely Farfan: cell ([phone])  Dr. Yadi Nagel: cell ([phone])  Lynnette Arciniega DNP: cell ([phone])  Benja Sofia NP: cell ([phone])   Laila Ballard NP: ENZO Novak LMSW: cell ([phone])

## 2022-11-29 NOTE — PROGRESS NOTE ADULT - SUBJECTIVE AND OBJECTIVE BOX
HPI:  72 male PMHx HTN, Afib (on pradaxa), and recently dx stage IV pancreatic cancer (inoperable, on chemo) presents with intractable nausea and vomiting for 2 days. Patient was diagnosed with pancreatic cancer at [facility] during a hospital admission for CVA. Pt was recently started on chemotherapy and received his last session this past friday 11/18. Since then patient has had severe nausea and multiple episodes of NBNB vomiting unrelieved by his compazine or prochlorperazine which he was prescribed by his oncologist. Patient has also had loss of appetite unrelated to his nausea and epigastric pain. Patient endorses dizziness as well and had a presyncopal episode this morning, denies head trauma or loss of consciousness Patient denies fever chills, headache, chest pain, palpitations, cough, SOB, diarrhea/constipation, dysuria or leg swelling. NKDA. Patient follows Dr. Lulú Garcia at [facility] (22 Nov 2022 15:29)      Patient is a 72y old  Male who presents with a chief complaint of Intractable vomiting (29 Nov 2022 07:37)      INTERVAL HPI/OVERNIGHT EVENTS:  T(C): 36.8 (11-29-22 @ 14:12), Max: 36.9 (11-28-22 @ 21:55)  HR: 67 (11-29-22 @ 14:12) (67 - 70)  BP: 127/56 (11-29-22 @ 14:12) (110/58 - 131/62)  RR: 20 (11-29-22 @ 14:12) (16 - 20)  SpO2: 99% (11-29-22 @ 14:12) (98% - 100%)  Wt(kg): --  I&O's Summary      REVIEW OF SYSTEMS: denies fever, chills, SOB, palpitations, chest pain, abdominal pain, nausea, vomitting, diarrhea, constipation, dizziness    MEDICATIONS  (STANDING):  aMIOdarone    Tablet 100 milliGRAM(s) Oral daily  atorvastatin 20 milliGRAM(s) Oral at bedtime  dabigatran 150 milliGRAM(s) Oral every 12 hours  diltiazem    milliGRAM(s) Oral daily  doxazosin 4 milliGRAM(s) Oral at bedtime  dronabinol 2.5 milliGRAM(s) Oral three times a day  lactated ringers. 1000 milliLiter(s) (100 mL/Hr) IV Continuous <Continuous>  lactated ringers. 1000 milliLiter(s) (100 mL/Hr) IV Continuous <Continuous>  losartan 100 milliGRAM(s) Oral daily  pantoprazole    Tablet 40 milliGRAM(s) Oral before breakfast  potassium chloride    Tablet ER 20 milliEquivalent(s) Oral every 2 hours  senna 1 Tablet(s) Oral at bedtime    MEDICATIONS  (PRN):  acetaminophen     Tablet .. 650 milliGRAM(s) Oral every 6 hours PRN Temp greater or equal to 38C (100.4F), Mild Pain (1 - 3)  aluminum hydroxide/magnesium hydroxide/simethicone Suspension 30 milliLiter(s) Oral every 4 hours PRN Dyspepsia  HYDROmorphone   Tablet 2 milliGRAM(s) Oral every 4 hours PRN Severe Pain (7 - 10)  melatonin 3 milliGRAM(s) Oral at bedtime PRN Insomnia  metoclopramide Injectable 10 milliGRAM(s) IV Push every 8 hours PRN vomiting  naloxone 1 mG/mL Injection for Intranasal Use 4 milliGRAM(s) IntraNasal once PRN unresponsiveness  ondansetron Injectable 4 milliGRAM(s) IV Push every 6 hours PRN Nausea and/or Vomiting  sodium chloride 0.65% Nasal 1 Spray(s) Both Nostrils two times a day PRN Nasal Congestion      PHYSICAL EXAM:  GENERAL: NAD, well-groomed, well-developed  HEAD:  Atraumatic, Normocephalic  EYES: EOMI, PERRLA, conjunctiva and sclera clear  ENMT: No tonsillar erythema, exudates, or enlargement; Moist mucous membranes, Good dentition, No lesions  NECK: Supple, No JVD, Normal thyroid  NERVOUS SYSTEM:  Alert & Oriented X3, Good concentration; Motor Strength 5/5 B/L upper and lower extremities; DTRs 2+ intact and symmetric  CHEST/LUNG: Clear to percussion bilaterally; No rales, rhonchi, wheezing, or rubs  HEART: Regular rate and rhythm; No murmurs, rubs, or gallops  ABDOMEN: Soft, Nontender, Nondistended; Bowel sounds present  EXTREMITIES:  2+ Peripheral Pulses, No clubbing, cyanosis, or edema  LYMPH: No lymphadenopathy noted  SKIN: No rashes or lesions  LABS:                        9.6    3.60  )-----------( 205      ( 29 Nov 2022 08:21 )             30.2     11-29    139  |  103  |  13  ----------------------------<  124<H>  3.4<L>   |  28  |  0.74    Ca    8.2<L>      29 Nov 2022 08:21    TPro  5.1<L>  /  Alb  2.7<L>  /  TBili  0.8  /  DBili  x   /  AST  43<H>  /  ALT  171<H>  /  AlkPhos  575<H>  11-29        CAPILLARY BLOOD GLUCOSE

## 2022-11-29 NOTE — PROGRESS NOTE ADULT - SUBJECTIVE AND OBJECTIVE BOX
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    No acute events. No complaints.    REVIEW OF SYSTEMS  A complete review of systems was performed; negative except per HPI    PHYSICAL EXAM  T(C): 36.7 (11-29-22 @ 06:29), Max: 36.9 (11-28-22 @ 21:55)  HR: 70 (11-29-22 @ 06:29) (68 - 70)  BP: 110/58 (11-29-22 @ 06:29) (110/58 - 131/62)  RR: 18 (11-29-22 @ 06:29) (16 - 18)  SpO2: 98% (11-29-22 @ 06:29) (98% - 100%)  Constitutional: alert, awake, in no acute distress  Eyes: PERRL, EOMI  HEENT: normocephalic, atraumatic  Neck: supple, non-tender  Cardiovascular: normal perfusion, no peripheral edema  Respiratory: normal respiratory efforts; no increased use of accessory muscles  Gastrointestinal: soft, non-tender  Musculoskeletal: normal range of motion, no deformities noted  Neurological: alert, CN II to XI grossly intact  Skin: warm, dry    LABORATORY DATA                        8.8    3.40  )-----------( 145      ( 28 Nov 2022 06:14 )             26.7     11-28    140  |  106  |  11  ----------------------------<  137<H>  3.8   |  27  |  0.65    Ca    8.3<L>      28 Nov 2022 06:14    TPro  4.7<L>  /  Alb  2.3<L>  /  TBili  0.7  /  DBili  0.4<H>  /  AST  32  /  ALT  161<H>  /  AlkPhos  471<H>  11-28   CHIEF COMPLAINT  Vomiting    HISTORY OF PRESENT ILLNESS  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    No acute events. Continues to have cough, loss of taste.    REVIEW OF SYSTEMS  A complete review of systems was performed; negative except per HPI    PHYSICAL EXAM  T(C): 36.7 (11-29-22 @ 06:29), Max: 36.9 (11-28-22 @ 21:55)  HR: 70 (11-29-22 @ 06:29) (68 - 70)  BP: 110/58 (11-29-22 @ 06:29) (110/58 - 131/62)  RR: 18 (11-29-22 @ 06:29) (16 - 18)  SpO2: 98% (11-29-22 @ 06:29) (98% - 100%)  Constitutional: alert, awake, in no acute distress  Eyes: PERRL, EOMI  HEENT: normocephalic, atraumatic  Neck: supple, non-tender  Cardiovascular: normal perfusion, no peripheral edema  Respiratory: normal respiratory efforts; no increased use of accessory muscles  Gastrointestinal: soft, non-tender  Musculoskeletal: normal range of motion, no deformities noted  Neurological: alert, CN II to XI grossly intact  Skin: warm, dry    LABORATORY DATA                        8.8    3.40  )-----------( 145      ( 28 Nov 2022 06:14 )             26.7     11-28    140  |  106  |  11  ----------------------------<  137<H>  3.8   |  27  |  0.65    Ca    8.3<L>      28 Nov 2022 06:14    TPro  4.7<L>  /  Alb  2.3<L>  /  TBili  0.7  /  DBili  0.4<H>  /  AST  32  /  ALT  161<H>  /  AlkPhos  471<H>  11-28

## 2022-11-29 NOTE — DISCHARGE NOTE NURSING/CASE MANAGEMENT/SOCIAL WORK - NSDCCRTYPESERV_GEN_ALL_CORE_FT
case management for housekeeping and/or home care, Meals on Wheels program, [employer] application assistance, etc.

## 2022-11-29 NOTE — PROGRESS NOTE ADULT - PROBLEM SELECTOR PLAN 8
from home  f/u [facility] for chemo  medically cleared for discharge  pt got 24h notice, refusing to leave  CM onboard.

## 2022-11-29 NOTE — PROGRESS NOTE ADULT - PROBLEM SELECTOR PLAN 3
dx last month, inoperable, on active chemo (last 11/18, every friday per pt)  follows at [facility] MORGAN Garcia  c/w home pain control regimen, fentanyl patch and dilaudid PRN  Increased Fentanyl to 50mcg and Dilaudid 2mg q4h prn per per heme/onc  Palliative following- patient wants to be treated at [facility] hospital where his oncologist is.  Heme/Onc Varun following

## 2022-11-29 NOTE — PROGRESS NOTE ADULT - PROBLEM SELECTOR PLAN 7
DVT ppx -on Pradaxa
c/w amiodarone, cardizem and pradaxa (home meds)
DVT ppx -on Pradaxa
DVT ppx -on Pradaxa

## 2022-11-29 NOTE — PROGRESS NOTE ADULT - PROBLEM SELECTOR PROBLEM 3
2019 novel coronavirus disease (COVID-19)
Pancreatic cancer
2019 novel coronavirus disease (COVID-19)
2019 novel coronavirus disease (COVID-19)
Pancreatic cancer

## 2022-11-29 NOTE — PROGRESS NOTE ADULT - REASON FOR ADMISSION
Intractable vomiting

## 2022-11-29 NOTE — PROGRESS NOTE ADULT - PROVIDER SPECIALTY LIST ADULT
Heme/Onc
Heme/Onc
Internal Medicine
Internal Medicine
Palliative Care
Heme/Onc
Heme/Onc
Internal Medicine
Palliative Care
Internal Medicine
Palliative Care
Heme/Onc
Heme/Onc
Internal Medicine

## 2022-11-30 VITALS
SYSTOLIC BLOOD PRESSURE: 124 MMHG | TEMPERATURE: 98 F | RESPIRATION RATE: 18 BRPM | OXYGEN SATURATION: 100 % | HEART RATE: 60 BPM | DIASTOLIC BLOOD PRESSURE: 55 MMHG

## 2022-11-30 PROCEDURE — 82746 ASSAY OF FOLIC ACID SERUM: CPT

## 2022-11-30 PROCEDURE — 87086 URINE CULTURE/COLONY COUNT: CPT

## 2022-11-30 PROCEDURE — 87186 SC STD MICRODIL/AGAR DIL: CPT

## 2022-11-30 PROCEDURE — 87040 BLOOD CULTURE FOR BACTERIA: CPT

## 2022-11-30 PROCEDURE — 82962 GLUCOSE BLOOD TEST: CPT

## 2022-11-30 PROCEDURE — 85610 PROTHROMBIN TIME: CPT

## 2022-11-30 PROCEDURE — 0225U NFCT DS DNA&RNA 21 SARSCOV2: CPT

## 2022-11-30 PROCEDURE — 96374 THER/PROPH/DIAG INJ IV PUSH: CPT

## 2022-11-30 PROCEDURE — 85025 COMPLETE CBC W/AUTO DIFF WBC: CPT

## 2022-11-30 PROCEDURE — 83036 HEMOGLOBIN GLYCOSYLATED A1C: CPT

## 2022-11-30 PROCEDURE — 93005 ELECTROCARDIOGRAM TRACING: CPT

## 2022-11-30 PROCEDURE — 84484 ASSAY OF TROPONIN QUANT: CPT

## 2022-11-30 PROCEDURE — 80076 HEPATIC FUNCTION PANEL: CPT

## 2022-11-30 PROCEDURE — 84100 ASSAY OF PHOSPHORUS: CPT

## 2022-11-30 PROCEDURE — 80053 COMPREHEN METABOLIC PANEL: CPT

## 2022-11-30 PROCEDURE — 70450 CT HEAD/BRAIN W/O DYE: CPT

## 2022-11-30 PROCEDURE — 82607 VITAMIN B-12: CPT

## 2022-11-30 PROCEDURE — 82728 ASSAY OF FERRITIN: CPT

## 2022-11-30 PROCEDURE — 36415 COLL VENOUS BLD VENIPUNCTURE: CPT

## 2022-11-30 PROCEDURE — 85027 COMPLETE CBC AUTOMATED: CPT

## 2022-11-30 PROCEDURE — 83540 ASSAY OF IRON: CPT

## 2022-11-30 PROCEDURE — 86803 HEPATITIS C AB TEST: CPT

## 2022-11-30 PROCEDURE — 96375 TX/PRO/DX INJ NEW DRUG ADDON: CPT

## 2022-11-30 PROCEDURE — 80048 BASIC METABOLIC PNL TOTAL CA: CPT

## 2022-11-30 PROCEDURE — 99285 EMERGENCY DEPT VISIT HI MDM: CPT

## 2022-11-30 PROCEDURE — 97161 PT EVAL LOW COMPLEX 20 MIN: CPT

## 2022-11-30 PROCEDURE — 83735 ASSAY OF MAGNESIUM: CPT

## 2022-11-30 PROCEDURE — 82272 OCCULT BLD FECES 1-3 TESTS: CPT

## 2022-11-30 PROCEDURE — 85730 THROMBOPLASTIN TIME PARTIAL: CPT

## 2022-11-30 PROCEDURE — 81001 URINALYSIS AUTO W/SCOPE: CPT

## 2022-11-30 PROCEDURE — 82009 KETONE BODYS QUAL: CPT

## 2022-11-30 PROCEDURE — 83550 IRON BINDING TEST: CPT

## 2022-11-30 PROCEDURE — 71045 X-RAY EXAM CHEST 1 VIEW: CPT

## 2022-11-30 PROCEDURE — 83690 ASSAY OF LIPASE: CPT

## 2022-11-30 RX ORDER — FENTANYL CITRATE 50 UG/ML
1 INJECTION INTRAVENOUS
Qty: 3 | Refills: 0
Start: 2022-11-30 | End: 2022-12-08

## 2022-11-30 RX ORDER — DRONABINOL 2.5 MG
1 CAPSULE ORAL
Qty: 21 | Refills: 0
Start: 2022-11-30 | End: 2022-12-06

## 2022-11-30 RX ORDER — PANTOPRAZOLE SODIUM 20 MG/1
1 TABLET, DELAYED RELEASE ORAL
Qty: 30 | Refills: 0
Start: 2022-11-30 | End: 2022-12-29

## 2022-11-30 RX ADMIN — PANTOPRAZOLE SODIUM 40 MILLIGRAM(S): 20 TABLET, DELAYED RELEASE ORAL at 06:21

## 2022-11-30 RX ADMIN — Medication 120 MILLIGRAM(S): at 06:21

## 2022-11-30 RX ADMIN — Medication 2.5 MILLIGRAM(S): at 06:21

## 2022-11-30 RX ADMIN — LOSARTAN POTASSIUM 100 MILLIGRAM(S): 100 TABLET, FILM COATED ORAL at 06:21

## 2022-11-30 RX ADMIN — DABIGATRAN ETEXILATE MESYLATE 150 MILLIGRAM(S): 150 CAPSULE ORAL at 06:21

## 2022-11-30 RX ADMIN — HYDROMORPHONE HYDROCHLORIDE 2 MILLIGRAM(S): 2 INJECTION INTRAMUSCULAR; INTRAVENOUS; SUBCUTANEOUS at 01:46

## 2022-11-30 RX ADMIN — FENTANYL CITRATE 1 PATCH: 50 INJECTION INTRAVENOUS at 10:02

## 2022-11-30 RX ADMIN — AMIODARONE HYDROCHLORIDE 100 MILLIGRAM(S): 400 TABLET ORAL at 06:21

## 2022-11-30 RX ADMIN — HYDROMORPHONE HYDROCHLORIDE 2 MILLIGRAM(S): 2 INJECTION INTRAMUSCULAR; INTRAVENOUS; SUBCUTANEOUS at 01:10

## 2023-01-07 ENCOUNTER — EMERGENCY (EMERGENCY)
Facility: HOSPITAL | Age: 73
LOS: 1 days | Discharge: HOME | End: 2023-01-07
Attending: STUDENT IN AN ORGANIZED HEALTH CARE EDUCATION/TRAINING PROGRAM
Payer: MEDICARE

## 2023-01-07 VITALS
WEIGHT: 136.03 LBS | HEART RATE: 72 BPM | HEIGHT: 69 IN | RESPIRATION RATE: 17 BRPM | OXYGEN SATURATION: 99 % | DIASTOLIC BLOOD PRESSURE: 63 MMHG | TEMPERATURE: 98 F | SYSTOLIC BLOOD PRESSURE: 100 MMHG

## 2023-01-07 VITALS
DIASTOLIC BLOOD PRESSURE: 64 MMHG | TEMPERATURE: 98 F | RESPIRATION RATE: 18 BRPM | HEART RATE: 89 BPM | OXYGEN SATURATION: 95 % | SYSTOLIC BLOOD PRESSURE: 121 MMHG

## 2023-01-07 LAB
ALBUMIN SERPL ELPH-MCNC: 2.8 G/DL — LOW (ref 3.5–5)
ALP SERPL-CCNC: 434 U/L — HIGH (ref 40–120)
ALT FLD-CCNC: 45 U/L DA — SIGNIFICANT CHANGE UP (ref 10–60)
ANION GAP SERPL CALC-SCNC: 8 MMOL/L — SIGNIFICANT CHANGE UP (ref 5–17)
APPEARANCE UR: CLEAR — SIGNIFICANT CHANGE UP
AST SERPL-CCNC: 48 U/L — HIGH (ref 10–40)
BACTERIA # UR AUTO: ABNORMAL /HPF
BASOPHILS # BLD AUTO: 0.03 K/UL — SIGNIFICANT CHANGE UP (ref 0–0.2)
BASOPHILS NFR BLD AUTO: 0.2 % — SIGNIFICANT CHANGE UP (ref 0–2)
BILIRUB SERPL-MCNC: 2 MG/DL — HIGH (ref 0.2–1.2)
BILIRUB UR-MCNC: NEGATIVE — SIGNIFICANT CHANGE UP
BUN SERPL-MCNC: 14 MG/DL — SIGNIFICANT CHANGE UP (ref 7–18)
CALCIUM SERPL-MCNC: 8.5 MG/DL — SIGNIFICANT CHANGE UP (ref 8.4–10.5)
CHLORIDE SERPL-SCNC: 99 MMOL/L — SIGNIFICANT CHANGE UP (ref 96–108)
CO2 SERPL-SCNC: 28 MMOL/L — SIGNIFICANT CHANGE UP (ref 22–31)
COD CRY URNS QL: ABNORMAL /HPF
COLOR SPEC: YELLOW — SIGNIFICANT CHANGE UP
COMMENT - URINE: SIGNIFICANT CHANGE UP
CREAT SERPL-MCNC: 0.92 MG/DL — SIGNIFICANT CHANGE UP (ref 0.5–1.3)
DIFF PNL FLD: ABNORMAL
EGFR: 88 ML/MIN/1.73M2 — SIGNIFICANT CHANGE UP
EOSINOPHIL # BLD AUTO: 0.05 K/UL — SIGNIFICANT CHANGE UP (ref 0–0.5)
EOSINOPHIL NFR BLD AUTO: 0.4 % — SIGNIFICANT CHANGE UP (ref 0–6)
EPI CELLS # UR: ABNORMAL /HPF
FLUAV AG NPH QL: SIGNIFICANT CHANGE UP
FLUBV AG NPH QL: SIGNIFICANT CHANGE UP
GLUCOSE SERPL-MCNC: 135 MG/DL — HIGH (ref 70–99)
GLUCOSE UR QL: NEGATIVE — SIGNIFICANT CHANGE UP
GRAN CASTS # UR COMP ASSIST: ABNORMAL /LPF
HCT VFR BLD CALC: 34.7 % — LOW (ref 39–50)
HGB BLD-MCNC: 11.3 G/DL — LOW (ref 13–17)
HYALINE CASTS # UR AUTO: ABNORMAL /LPF
IMM GRANULOCYTES NFR BLD AUTO: 1 % — HIGH (ref 0–0.9)
KETONES UR-MCNC: ABNORMAL
LACTATE SERPL-SCNC: 1.3 MMOL/L — SIGNIFICANT CHANGE UP (ref 0.7–2)
LEUKOCYTE ESTERASE UR-ACNC: NEGATIVE — SIGNIFICANT CHANGE UP
LIDOCAIN IGE QN: 19 U/L — LOW (ref 73–393)
LYMPHOCYTES # BLD AUTO: 0.85 K/UL — LOW (ref 1–3.3)
LYMPHOCYTES # BLD AUTO: 6.8 % — LOW (ref 13–44)
MAGNESIUM SERPL-MCNC: 2 MG/DL — SIGNIFICANT CHANGE UP (ref 1.6–2.6)
MCHC RBC-ENTMCNC: 31.1 PG — SIGNIFICANT CHANGE UP (ref 27–34)
MCHC RBC-ENTMCNC: 32.6 GM/DL — SIGNIFICANT CHANGE UP (ref 32–36)
MCV RBC AUTO: 95.6 FL — SIGNIFICANT CHANGE UP (ref 80–100)
MONOCYTES # BLD AUTO: 1.07 K/UL — HIGH (ref 0–0.9)
MONOCYTES NFR BLD AUTO: 8.6 % — SIGNIFICANT CHANGE UP (ref 2–14)
NEUTROPHILS # BLD AUTO: 10.31 K/UL — HIGH (ref 1.8–7.4)
NEUTROPHILS NFR BLD AUTO: 83 % — HIGH (ref 43–77)
NITRITE UR-MCNC: NEGATIVE — SIGNIFICANT CHANGE UP
NRBC # BLD: 0 /100 WBCS — SIGNIFICANT CHANGE UP (ref 0–0)
PH UR: 6 — SIGNIFICANT CHANGE UP (ref 5–8)
PHOSPHATE SERPL-MCNC: 3.3 MG/DL — SIGNIFICANT CHANGE UP (ref 2.5–4.5)
PLATELET # BLD AUTO: 332 K/UL — SIGNIFICANT CHANGE UP (ref 150–400)
POTASSIUM SERPL-MCNC: 2.8 MMOL/L — CRITICAL LOW (ref 3.5–5.3)
POTASSIUM SERPL-SCNC: 2.8 MMOL/L — CRITICAL LOW (ref 3.5–5.3)
PROT SERPL-MCNC: 6.1 G/DL — SIGNIFICANT CHANGE UP (ref 6–8.3)
PROT UR-MCNC: 30 MG/DL
RBC # BLD: 3.63 M/UL — LOW (ref 4.2–5.8)
RBC # FLD: 15 % — HIGH (ref 10.3–14.5)
RBC CASTS # UR COMP ASSIST: ABNORMAL /HPF (ref 0–2)
SARS-COV-2 RNA SPEC QL NAA+PROBE: SIGNIFICANT CHANGE UP
SODIUM SERPL-SCNC: 135 MMOL/L — SIGNIFICANT CHANGE UP (ref 135–145)
SP GR SPEC: 1.01 — SIGNIFICANT CHANGE UP (ref 1.01–1.02)
UROBILINOGEN FLD QL: NEGATIVE — SIGNIFICANT CHANGE UP
WBC # BLD: 12.44 K/UL — HIGH (ref 3.8–10.5)
WBC # FLD AUTO: 12.44 K/UL — HIGH (ref 3.8–10.5)
WBC UR QL: SIGNIFICANT CHANGE UP /HPF (ref 0–5)

## 2023-01-07 PROCEDURE — 81001 URINALYSIS AUTO W/SCOPE: CPT

## 2023-01-07 PROCEDURE — 96376 TX/PRO/DX INJ SAME DRUG ADON: CPT | Mod: XU

## 2023-01-07 PROCEDURE — 96374 THER/PROPH/DIAG INJ IV PUSH: CPT | Mod: XU

## 2023-01-07 PROCEDURE — 84100 ASSAY OF PHOSPHORUS: CPT

## 2023-01-07 PROCEDURE — 99285 EMERGENCY DEPT VISIT HI MDM: CPT

## 2023-01-07 PROCEDURE — 87086 URINE CULTURE/COLONY COUNT: CPT

## 2023-01-07 PROCEDURE — 36415 COLL VENOUS BLD VENIPUNCTURE: CPT

## 2023-01-07 PROCEDURE — 74177 CT ABD & PELVIS W/CONTRAST: CPT | Mod: MA

## 2023-01-07 PROCEDURE — 83735 ASSAY OF MAGNESIUM: CPT

## 2023-01-07 PROCEDURE — 71260 CT THORAX DX C+: CPT | Mod: 26,MA

## 2023-01-07 PROCEDURE — 87637 SARSCOV2&INF A&B&RSV AMP PRB: CPT

## 2023-01-07 PROCEDURE — 83605 ASSAY OF LACTIC ACID: CPT

## 2023-01-07 PROCEDURE — 85025 COMPLETE CBC W/AUTO DIFF WBC: CPT

## 2023-01-07 PROCEDURE — 83690 ASSAY OF LIPASE: CPT

## 2023-01-07 PROCEDURE — 71260 CT THORAX DX C+: CPT | Mod: MA

## 2023-01-07 PROCEDURE — 99284 EMERGENCY DEPT VISIT MOD MDM: CPT | Mod: 25

## 2023-01-07 PROCEDURE — 74177 CT ABD & PELVIS W/CONTRAST: CPT | Mod: 26,MA

## 2023-01-07 PROCEDURE — 96375 TX/PRO/DX INJ NEW DRUG ADDON: CPT | Mod: XU

## 2023-01-07 PROCEDURE — 80053 COMPREHEN METABOLIC PANEL: CPT

## 2023-01-07 RX ORDER — POTASSIUM CHLORIDE 20 MEQ
10 PACKET (EA) ORAL
Refills: 0 | Status: DISCONTINUED | OUTPATIENT
Start: 2023-01-07 | End: 2023-01-11

## 2023-01-07 RX ORDER — SODIUM CHLORIDE 9 MG/ML
1000 INJECTION INTRAMUSCULAR; INTRAVENOUS; SUBCUTANEOUS ONCE
Refills: 0 | Status: COMPLETED | OUTPATIENT
Start: 2023-01-07 | End: 2023-01-07

## 2023-01-07 RX ORDER — ONDANSETRON 8 MG/1
4 TABLET, FILM COATED ORAL ONCE
Refills: 0 | Status: COMPLETED | OUTPATIENT
Start: 2023-01-07 | End: 2023-01-07

## 2023-01-07 RX ORDER — POTASSIUM CHLORIDE 20 MEQ
40 PACKET (EA) ORAL ONCE
Refills: 0 | Status: COMPLETED | OUTPATIENT
Start: 2023-01-07 | End: 2023-01-07

## 2023-01-07 RX ORDER — MAGNESIUM SULFATE 500 MG/ML
1 VIAL (ML) INJECTION ONCE
Refills: 0 | Status: COMPLETED | OUTPATIENT
Start: 2023-01-07 | End: 2023-01-07

## 2023-01-07 RX ORDER — MORPHINE SULFATE 50 MG/1
4 CAPSULE, EXTENDED RELEASE ORAL ONCE
Refills: 0 | Status: DISCONTINUED | OUTPATIENT
Start: 2023-01-07 | End: 2023-01-07

## 2023-01-07 RX ADMIN — Medication 100 GRAM(S): at 18:06

## 2023-01-07 RX ADMIN — Medication 40 MILLIEQUIVALENT(S): at 18:04

## 2023-01-07 RX ADMIN — Medication 100 MILLIEQUIVALENT(S): at 21:10

## 2023-01-07 RX ADMIN — ONDANSETRON 4 MILLIGRAM(S): 8 TABLET, FILM COATED ORAL at 21:17

## 2023-01-07 RX ADMIN — ONDANSETRON 4 MILLIGRAM(S): 8 TABLET, FILM COATED ORAL at 15:25

## 2023-01-07 RX ADMIN — MORPHINE SULFATE 4 MILLIGRAM(S): 50 CAPSULE, EXTENDED RELEASE ORAL at 18:05

## 2023-01-07 RX ADMIN — MORPHINE SULFATE 4 MILLIGRAM(S): 50 CAPSULE, EXTENDED RELEASE ORAL at 18:40

## 2023-01-07 RX ADMIN — Medication 100 MILLIEQUIVALENT(S): at 18:06

## 2023-01-07 RX ADMIN — SODIUM CHLORIDE 1000 MILLILITER(S): 9 INJECTION INTRAMUSCULAR; INTRAVENOUS; SUBCUTANEOUS at 15:25

## 2023-01-07 NOTE — ED PROVIDER NOTE - CLINICAL SUMMARY MEDICAL DECISION MAKING FREE TEXT BOX
72M presenting with nausea, vomiting and inability to tolerate PO. non-tender abdomen. spoke with physician at [facility] where the patient follows. patient scheduled for CT chest, abdomen and pelvis on monday. will get scans here to assess for intraabdominal etiology of symptoms, pancreatitis, worsening cancer load, colitis.   Oncologist: Dr. Tila Garcia at [facility]

## 2023-01-07 NOTE — ED ADULT TRIAGE NOTE - CHIEF COMPLAINT QUOTE
Nausea and vomiting x 3 days. Pt reports had pancreatic cancer, last chemo x 2 weeks ago. Pt noted fentanyl patch to left lateral arm.

## 2023-01-07 NOTE — ED PROVIDER NOTE - PATIENT PORTAL LINK FT
You can access the FollowMyHealth Patient Portal offered by [facility] by registering at the following website: [email or web address]. By joining [facility]’s FollowMyHealth portal, you will also be able to view your health information using other applications (apps) compatible with our system.

## 2023-01-07 NOTE — ED PROVIDER NOTE - PHYSICAL EXAMINATION
General: well appearing male, no acute distress   HEENT: normocephalic, atraumatic   Respiratory: normal work of breathing  Cardiac: regular rate and rhythm   Abdomen: soft, non-tender, no guarding or rebound   MSK: no swelling or tenderness of lower extremities, moving all extremities spontaneously   Skin: warm, dry   Neuro: A&Ox3  Psych: appropriate affect

## 2023-01-07 NOTE — ED PROVIDER NOTE - NSTIMEPROVIDERCAREINITIATE_GEN_ER
ED HPI GENERAL MEDICAL PROBLEM





- General


Chief Complaint: General


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


Time Seen by Provider: 05/18/20 00:11


Source of Information: Reports: Patient, EMS, EMS Notes Reviewed, Family (

 by telephone), Old Records ([facility] EMR.  No paper 

hospital chart available.)


History Limitations: Reports: Altered Mental Status





- History of Present Illness


INITIAL COMMENTS - FREE TEXT/NARRATIVE: 





The patient was brought to the emergency room via ambulance with basic EMT 

transport with no treatment in route. Note that the patient has been 

experiencing some progressive organic brain syndrome type symptoms, including 

wandering this evening, with her  no longer able to take care of the 

patient at home. Very limited history available, including our own hospital 

records, limited history from the EMTs, and limited history from her  by 

telephone. Her  apparently gave her an extra dose of Zyprexa earlier 

this evening with no improvement of her symptoms. No apparent recent chest pain

, anginal complaints, abdominal pain, UTI symptoms, fever, cough, etc. The 

patient denies any pain or discomfort.


Onset: Gradual


Duration: Getting Worse


Location: Reports: Other (No pain)


Quality: Reports: Same as Previous Episode


Severity: Severe (Progressive organic brain syndrome)


Improves with: Reports: None


Worsens with: Reports: None


Context: Reports: Other (As above).  Denies: Sick Contact, Trauma


Associated Symptoms: Reports: Confusion (As above).  Denies: Cough, Fever/Chills

, Headaches, Malaise, Nausea/Vomiting, Shortness of Breath


Treatments PTA: Reports: Other (see below) (None)





- Related Data


 Allergies











Allergy/AdvReac Type Severity Reaction Status Date / Time


 


No Known Allergies Allergy   Verified 10/19/19 15:48











Home Meds: 


 Home Meds





Ascorbic Acid [Vitamin C] 500 mg PO Q2D 10/19/19 [History]


Calcium Carbonate [Calcium] 1 tab PO DAILY 10/19/19 [History]


Donepezil HCl [Aricept] 0.5 tab PO BID 10/19/19 [History]


FLUoxetine [PROzac] 40 mg PO DAILY 10/19/19 [History]


Ferrous Sulfate 0.5 tab PO Q2D 10/19/19 [History]


Potassium Chloride 1 tab PO DAILY 10/19/19 [History]


Simvastatin 20 mg PO BEDTIME 10/19/19 [History]


hydroCHLOROthiazide [Hydrochlorothiazide] 12.5 mg PO DAILY 10/19/19 [History]


lisinopriL [Lisinopril] 40 mg PO DAILY 10/19/19 [History]


OLANZapine [ZyPREXA] 2.5 mg PO DAILY 05/18/20 [History]


busPIRone HCl [Buspirone HCl] 15 mg PO TID 05/18/20 [History]











Past Medical History


HEENT History: Reports: Hard of Hearing, Impaired Vision


Other HEENT History: Presbycusis with previous bilateral hearing aides. Patient 

does have a lower partial denture. She wears glasses.


Cardiovascular History: Reports: High Cholesterol, Hypertension


LMP (Approximate): Menopausal


Neurological History: Reports: Alzheimers Disease


Psychiatric History: Reports: Alzheimers Disease, Anxiety, Dementia, Depression


Endocrine/Metabolic History: Reports: Hypokalemia, Hypomagnesemia


Hematologic History: Reports: B12 Deficiency





- Past Surgical History


HEENT Surgical History: Reports: Oral Surgery, Other (See Below)


Other HEENT Surgeries/Procedures: Multiple teeth extractions





Social & Family History





- Tobacco Use


Smoking Status *Q: Current Every Day Smoker


Tobacco Use Within Last Twelve Months: Cigarettes


Years of Tobacco use: 60


Packs/Tins Daily: 1


Used Tobacco, but Quit: No


Smoking Cessation Information Provided To Patient: Patient Refused





- Caffeine Use


Caffeine Use: Reports: Coffee





- Living Situation & Occupation


Living situation: Reports: with Family ()





ED ROS GENERAL





- Review of Systems


Review Of Systems: Unable To Obtain


Reason Not Obtained: Limited history available. Patient poor historian/OBS





ED EXAM, GENERAL





- Physical Exam


Exam: See Below


Exam Limited By: No Limitations


General Appearance: Alert, WD/WN, No Apparent Distress, Anxious (Mild)


Eye Exam: Bilateral Eye: EOMI, Normal Inspection (No nystagmus. She does not 

have her glasses), PERRL


Ears: Normal Canal, Normal TMs, Hearing Loss (Mild to moderate presbycusis with 

no hearing aids present today)


Nose: Normal Inspection, Normal Mucosa, No Blood


Throat/Mouth: Normal Inspection, Normal Lips, Normal Teeth (Multiple missing 

teeth with the patient not having her lower partials today), Normal Gums, 

Normal Oropharynx, Normal Voice, No Airway Compromise.  No: Dysphagia, Perioral 

Cyanosis


Head: Atraumatic, Normocephalic.  No: Facial Swelling, Facial Tenderness, Sinus 

Tenderness


Neck: Supple, Non-Tender, Carotid Bruit (Mild bilateral carotid bruits).  No: 

Full Range of Motion, Lymphadenopathy (L), Lymphadenopathy (R), Thyromegaly


Respiratory/Chest: No Respiratory Distress, Lungs Clear, Normal Breath Sounds, 

No Accessory Muscle Use, Chest Non-Tender.  No: Pleural Rub, Retractions


Cardiovascular: Normal Peripheral Pulses, Regular Rate, Rhythm, No Edema, No 

Gallop, No JVD, No Murmur, No Rub.  No: Gallop/S3, Gallop/S4, Friction Rub


Peripheral Pulses: 2+: Radial (L), Radial (R)


GI/Abdominal: Normal Bowel Sounds, Soft, Non-Tender, No Organomegaly, No 

Distention, No Abnormal Bruit, No Mass, Pelvis Stable.  No: Guarding


 (Female) Exam: Deferred


Rectal (Female) Exam: Deferred


Back Exam: Normal Inspection, Full Range of Motion.  No: CVA Tenderness (L), 

CVA Tenderness (R), Muscle Spasm


Extremities: Normal Inspection, Normal Range of Motion, Non-Tender, No Pedal 

Edema, Normal Capillary Refill.  No: Ana's Sign


Neurological: Alert, Normal Gait, No Motor/Sensory Deficits, Confused (Moderate)

, Disoriented


Psychiatric: Anxious (Mild).  No: Depressed Mood


Skin Exam: Warm, Dry, Intact, Normal Color, No Rash.  No: Diaphoretic, 

Ecchymosis, Petechiae, Wound/Incision


Lymphatic: No Adenopathy





Course





- Vital Signs


Last Recorded V/S: 


 Last Vital Signs











Temp  36.8 C   05/18/20 00:40


 


Pulse  84   05/18/20 00:40


 


Resp  14   05/18/20 00:40


 


BP  187/71 H  05/18/20 00:40


 


Pulse Ox  98   05/18/20 00:40














- Orders/Labs/Meds


Orders: 


 Active Orders 24 hr











 Category Date Time Status


 


 Obtain Past Medical Record [OM.PC] Routine Oth  05/18/20 00:11 Active











Labs: 


None


Meds: 


None





- Radiology Interpretation


Free Text/Narrative:: 





None





Departure





- Departure


Time of Disposition: 00:30


Disposition: Refer to Observation


Condition: Fair


Clinical Impression: 


 Hypokalemia, Hypomagnesemia, Organic brain syndrome, Mixed anxiety depressive 

disorder





Hypertension


Qualifiers:


 Hypertension type: essential hypertension Qualified Code(s): I10 - Essential (

primary) hypertension





Hyperlipidemia


Qualifiers:


 Hyperlipidemia type: unspecified Qualified Code(s): E78.5 - Hyperlipidemia, 

unspecified





Osteoarthritis


Qualifiers:


 Osteoarthritis location: multiple joints Osteoarthritis type: primary 

Qualified Code(s): M89.49 - Other hypertrophic osteoarthropathy, multiple sites








- Discharge Information





Sepsis Event Note





- Focused Exam


Date Exam was Performed: 05/18/20


Time Exam was Performed: 00:46





- Problem List & Annotations


(1) Organic brain syndrome


SNOMED Code(s): 936366795


   Code(s): F09 - UNSP MENTAL DISORDER DUE TO KNOWN PHYSIOLOGICAL CONDITION   

Status: Chronic   Priority: High   Current Visit: Yes   Annotation/Comment:: 

Progressive Alzheimer's disease/organic brain syndrome with patient wandering. 

Her  does not feel that he can take care of her any longer. No 

aggressive behavior in the emergency room. Note that the patient did receive 

next dose of Zyprexa this evening with no significant improvement. IM Haldol, 

etc. depending on her clinical course. Blood work to be conducted in the a.m.. 

Care coordinator consultation has been ordered. Probable swing bed and/or 

nursing home placement.   





(2) Comfort measures only status


SNOMED Code(s): 79930789751838


   Code(s): Z51.5 - ENCOUNTER FOR PALLIATIVE CARE   Status: Chronic   Priority: 

High   Current Visit: Yes   Annotation/Comment:: NO CODE STATUS confirmed by 

the patient's  by telephone.   





(3) Hypertension


SNOMED Code(s): 41661869


   Code(s): I10 - ESSENTIAL (PRIMARY) HYPERTENSION   Status: Chronic   Priority

: High   Current Visit: Yes   Annotation/Comment:: Variable blood pressures 

both by the EMTs and also during emergency room care secondary to patient's 

agitation. She has not taken her medications yet this evening. Continue to 

observe closely.   


Qualifiers: 


   Hypertension type: essential hypertension   Qualified Code(s): I10 - 

Essential (primary) hypertension   





(4) Hyperlipidemia


SNOMED Code(s): 72266326


   Code(s): E78.5 - HYPERLIPIDEMIA, UNSPECIFIED   Status: Chronic   Priority: 

Medium   Current Visit: Yes   Annotation/Comment:: Currently under therapy. 

Consider discontinuation of her Zocor in the future secondary to her aggressive 

organic brain syndrome.   


Qualifiers: 


   Hyperlipidemia type: unspecified   Qualified Code(s): E78.5 - Hyperlipidemia

, unspecified   





(5) Hypokalemia


SNOMED Code(s): 01214419


   Code(s): E87.6 - HYPOKALEMIA   Status: Chronic   Priority: Medium   Current 

Visit: Yes   Onset Date: 10/19/19   Annotation/Comment:: Per medical records. 

Currently under therapy. Blood work in the a.m.   





(6) Hypomagnesemia


SNOMED Code(s): 488643400


   Code(s): E83.42 - HYPOMAGNESEMIA   Status: Chronic   Priority: Medium   

Current Visit: Yes   Onset Date: 10/19/19   Annotation/Comment:: Not currently 

under therapy. Blood work in the a.m.   





(7) Mixed anxiety depressive disorder


SNOMED Code(s): 970866738


   Code(s): F41.8 - OTHER SPECIFIED ANXIETY DISORDERS   Status: Chronic   

Priority: Medium   Current Visit: Yes   Annotation/Comment:: Stable by limited 

history. Continue current medical therapy for now.   





(8) Osteoarthritis


SNOMED Code(s): 574164001


   Code(s): M19.90 - UNSPECIFIED OSTEOARTHRITIS, UNSPECIFIED SITE   Status: 

Chronic   Priority: Medium   Current Visit: Yes   Annotation/Comment:: Stable 

by history   


Qualifiers: 


   Osteoarthritis location: multiple joints   Osteoarthritis type: primary   

Qualified Code(s): M89.49 - Other hypertrophic osteoarthropathy, multiple sites

   





- Problem List Review


Problem List Initiated/Reviewed/Updated: Yes





- My Orders


Last 24 Hours: 


My Active Orders





05/18/20 00:11


Obtain Past Medical Record [OM.PC] Routine 














- Assessment/Plan


Admission H&P: Please use this note as an admission H&P


Last 24 Hours: 


My Active Orders





05/18/20 00:11


Obtain Past Medical Record [OM.PC] Routine 











Assessment:: 





As above


Plan: 





As above. Extensive precautions were given to the patient and her  by 

telephone, who are in agreement with the treatment plan. The patient's 

condition is stable enough for observation status and general supervision.
07-Jan-2023 14:53

## 2023-01-07 NOTE — ED ADULT TRIAGE NOTE - BP NONINVASIVE DIASTOLIC (MM HG)
To Dr Dotson,    Not in terrible right now but when he moves his arm he does feel pain  Left side  injured  collar bone  No SOB  Didn't notice any bruising or swelling but feels that it visibly \"looks different than right side\"  Hurts when he moves, hears a clicking noise    Patient scheduled an appointment today at 4:40 with Dr Dotson    Ok to keep 4:40 or would you recommend sooner evaluation (ER?)  Any xrays  Please advise         63

## 2023-01-07 NOTE — ED ADULT NURSE NOTE - HOW OFTEN DO YOU HAVE A DRINK CONTAINING ALCOHOL?
Never
12/23/20: Spoke with patient's wife and dtr-in-law/Roseanne; NAIF BROWN present. Discussed status; reviewed medications, labs, diagnostics. Family aware patient remains in multi-organ failure (lungs, liver, heart, kidneys) and dependent on ventilator/pressor/HD to sustain life. Family acknowledges overall grave prognosis. Aware HD being deferred due to overall grave prognosis. Daughter-in-law states, "At this point, we knew this was coming." New KACIE completed: DNR / NO ESCALATION OF CARE / NO FURTHER HD.  services offered; family accepted. All questions answered; supportive counseling provided.
12/23/20: Spoke with patient's wife and dtr-in-law/Roseanne; NAIF BROWN present. Discussed status; reviewed medications, labs, diagnostics. Family aware patient remains in multi-organ failure (lungs, liver, heart, kidneys) and dependent on ventilator/pressor/HD to sustain life. Family acknowledges overall grave prognosis. Aware HD being deferred due to overall grave prognosis. Daughter-in-law states, "At this point, we knew this was coming." New MOLST completed: DNR / NO ESCALATION OF CARE / NO FURTHER HD.  services offered; family accepted. All questions answered; supportive counseling provided.    Update: Spoke with patient's son/Luke ([phone]); NAIF BROWN present. Son reports Naima Simon is patient's significant other and patient had previously shared medical information with her. Son defers to her for medical decision making and reports he has been involved with phone calls and face time sessions with Naima and his wife/Roseanne who conferences him in. He reports he is aware of patient's condition. No further questions; supportive counseling provided.
no

## 2023-01-07 NOTE — ED ADULT NURSE NOTE - OBJECTIVE STATEMENT
Patient presents to ED with Spouse with complains of nausea and vomiting. States he has not felt good since Tim. Patient has history of pacreatic CA with mets. Port-A Cath to right chest wall not accessed. 18 G peripheral IV to left AC placed by EMS. States he has not been able to keep anything down for about a week and has not had a bowel movement x 1 week. Has Fentanyl 50 mcg patch to Left deltoid placed at home by wife last night.

## 2023-01-07 NOTE — ED ADULT NURSE NOTE - CCCP TRG CHIEF CMPLNT
Pediatric BMT Clinic Progress Note  Date of Service: 9/26/2019    Interval Events: Antony is an 18 year old male with Fanconi Anemia now day +38 post second BMT with UCB (graft failure with first BMT T-cell depleted PBSC) He was discharged from the hospital earlier this week and is here with his mother for routine follow up and possible platelet transfusion. He is 100% donor engrafted with no signs of GVHD. Less fatigued each day, he is walking between car and clinic. He continues on double coverage with Isavuconazole and Ambisome for left upper lobe fusarium pneumonia. His Isavuconazole level was low, so his dose was increased and his CSA level was adjusted down to avoid possible supra therapeutic level with increase in Itraconazole dose. Infrequent wet cough continues without rhinorrhea or nasal congestion. No increased work of breathing or shortness of breath. He feels a thick mucous in his throat which he feels like he has to clear. His mouth sores are improving, he is using peridex swish, magic mouth wash and yesterday started cleaning his mouth with the swabs. Vomited once yesterday, taking bites of food but not eating or drinking. Continues on scheduled ativan and compazine for nausea, fairly well controlled.  Stools had been formed, now looser, more frequent (4 episodes past 24 hours vs 2). No abdominal cramping, no foul smell.  Has increased NS in TPN (+IL cycled to 12 hours) and 500 mL NS bolus x 2 (1 is pre flush for Ambisome). Fluids added for increased CR and BUN recently trending up. His discharge platelet parameter was 30K with fungal lung infection. He has required daily transfusions.    Review of Systems: Pertinent positives include those mentioned in interval events. A complete review of systems was performed and is otherwise negative.      Medications:  Please see MAR    Physical Exam:  Temp:  [98  F (36.7  C)] 98  F (36.7  C)  Pulse:  [108] 108  Resp:  [16] 16  BP: (104)/(68) 104/68  SpO2:  [99 %]  99 %     GEN: Lying on exam bed, appears relaxed, conversational. No distress. Mother present.  HEENT: Alopecia, NC/AT, nares patent. Lips edematous, large scab on lower & upper lips. Buccal mucosa with sloughing skin.  CARD: RRR, normal S1 and S2, no murmurs/rubs/gallops.  Cap refill 2 seconds  RESP: Easy breathing with good air movement noted in lower lobes, diminished bases bilaterally. No wheezing or crackles noted on exam today.  ABD:  Soft, non tender  EXTREM:  No edema noted.  SKIN: Pale thru out, no rashes noted. Skin is dry.  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 Underwent second BMT with 7/8 HLA matched UCB.  Ongoing post transplant complications include fusarium  pneumonia, electrolyte imbalances, anorexia requiring TPN/IL, nausea, and complex pain.     Now s/p UCB BMT day +38 with neutrophil recovery and 100% donor engraftment.  Afebrile and clinically stable. Remains on double coverage for fungal pneumonia. Isavuconazole dose increased based on low blood level. Ongoing nausea, but taking meds and keeping them down. Vomited once past 24 hours. Creatinine continues to trend down, plan to continue additional fluid in TPN and NS boluses. Stools looser and more frequent past 24 hours, does not seem infectious but will send collection cup home today. No platelet transfusion needed today.     BMT:  # Fanconi Anemia: diagnosed Fall 2010. Partial 1q deletion; s/p alpha/beta T-cell depleted 7/8 HLA matched unrelated PBSC transplant per 2017-17 (Cytoxan, Fludarabine, MP, and Rituximab) with myeloid engraftment followed by graft failure. Second alloHCT with 7/8 UCBT following FluATG on 8/19/19.   -Neutrophil recovery day +23. 100% myeloid and lymphoid donor engraftment as of 9/9.   - Defer  day +21 bone marrow to day + due to fungal pneumonia. Subsequent evaluations at + 6 months, +1 year, and +2 years.      # Risk for GVHD: S/p MMF.    - Continue CSA until 6 mos post-transplant; goal level 200-400.   - Level on  9/25 was 306, dose decreased empirically as Isavuconazole dose was increased significantly. Recheck level 9/27.      # Risk for aHUS/TA-TMA: No concern to date.   - LDH M/Th: 180  (9/23)  - Urine protein/creat: 0.52 (9/24)      FEN:  # Risk for malnutrition:   - Continue TPN/IL, cycled over 12 hours.   - Continue PO intake as tolerated. Continue marinol for nausea and appetite stimulant.      # Acute Kidney Injury (amphotericin, CSA, other): Up-trending creatinine past several days, stable UOP. Baseline creatinine is 0.4-0.6, recently stable at 1.1-1.2, now trending back down to 1.09 today with additional fluids.   - Continue additional fluid to TPN (960-1400 mL). Give 500 mL NS fluid bolus in addition to 500 mL NS Ambisome pre flush. Monitor labs closely.     # Electrolyte disturbances:   - Optimize electrolytes given shortened NH interval (K >3.4, Mg >2.0 (sliding scales in place), iCa> 4.5)    - Hyperkalemia: stable, adjusting in TPN     # Fluid overload: s/p diuril and bumex. Weight is stable and he is not edematous, appears to be handling additional fluid well.      Heme:   # Pancytopenia secondary to graft failure and chemotherapy:   - Transfuse for hgb <8.0 g/dL, and platelets <30k/uL given possible bleeding risk with fungal pneumonia  - No transfusion needed today with platelet level of 31k.  - Continue GCSF PRN for ANC <1000     # Coagulopathy:   - INR qMonday  - Continue Vitamin K in TPN       Cardiovascular:   # Risk for hypertension secondary to medications: Normotensive today.     # Shortened NH interval: Noted on pre-transplant workup EKG. Pediatric Cardiology consulted, follow clinically, obtain EKG/ECHO with any respiratory symptoms or dyspnea on extertion.  # Risk for  long QTc:  Most recheck QTc (9/5) 433.   # ST and T-wave changes (per 8/30 EKG). Echo revealed good function and repeat EKG (9/5) showed resolved T wave inversion with inferior lead ST depression on 9/5. No more monitoring  Unless clinically indicated.     Respiratory:    # Risk for pulmonary insufficiency: No difficulty breathing, no shortness of breath. Chest CT (9/10) stable.     Infectious Disease:   # Risk for infection given immunocompromised status:   - Viral ppx (Sero CMV+/HSV +): Continue high dose Valtrex until day +100. Given prolonged neutropenia/2nd alloHCT status, monitor CMV, adeno, EBV PCRs QMon. 9/23 CMV, EBV neg, Adeno pending.   - Continue TX dosing levofloxacin given significant mucositis   - Fungal ppx: receiving treatment as above  - PCP ppx: INH Pentamidine given 9/20 due to neutropenia. Consider bactrim next month.        # Hypogammaglobulinemia: IgG (9/10) 574 without need for IVIG.   -  IgG on 9/22 was 879, replace if <400,  However IVIG can affect Fungitell lab interpretation.     # Left upper lobe pneumonia (fusarium sp and CONS + per BAL 9/29): Completed CT Abd/pelvis with concern for retroperitoneal lymph node involvement. Sinus CT negative, Brain MRI negative. LP results negative. Ophtho exam with no evidence of fungal infection. ID following.   - Sensitive to both Isavuconazole and Ambisome. Continue double coverage for now. - Isavuconazole level low on 9/22 (current dose was 372 mg, 2 capsules) . Give loading dose of 558 mg (3 capsules), twice daily for 2 days starting tomorrow on 9/26, 9/27.   - On 9/28 back to daily dosing at increased dose of 558 mg (3 capsules) once daily. Recheck level on October 8 .    - Fungitell weekly, was initially positive, has been negative, pending from  9/24.  - Surgery consult: Surgery said willing to remove pulmonary lesion once counts are in, however, we are hesitant and this point post-transplant, consider in coming weeks      # Donor hep B surface  antigen positive: no need to check as donor is STANTON negative     Past Infections:  - Staph epi bacteremia (from PICC 9/2) and Coag negative Staph (from BAL 8/29): Both resolved.  S. Epi grew from both lumens of PICC 9/2 with subsequent cultures negative. s/p vancomycin locks (completed 9/6) and completed course of linezolid 9/12.  - Staph epi bacteremia (8/6-8/9), CVC removed after failed EtOH locks, s/p vanc course  - PNA (fungal vs. Atypical on chest CT 7/5), s/p azithro course     GI:   # Gastritis:   - Continue Protonix BID    # Loose stools: Onset overnight 9/26, had 2 episodes, has not typically had bowel movements overnight. Yesterday, stools were formed. Denies abdominal discomfort or cramping, no foul smell.  - sent stool collection cup home, ordered stool studies for tomorrow if able to obtain sample.     # Nausea:              - Continue Ativan TID, Kytril BID, Marinol prn.     # Risk for VOD/hyperbilirubinemia: US abdomen 9/4 revealed antegrade flow on Doppler and no ascites. S/p SMOF lipids. Bilirubin slowly improving.           - Continue hepatic panel Mon/Thurs   - Discontinued ursodiol on 9/22.                                 :  # History of hemorrhagic cystitis: Resolved     Endo:  # Risk for iatrogenic adrenal insufficiency: ACTH stim completed 9/14 today with peak cortisol 11.7 (borderline)- will defer physiologic replacement for now (okay per endocrine)  - Stress dose hydrocortisone upon acute illness or procedure      Neuro/Psych:  # Mucositis /oral and anorectal pain: ENT re-consulted 9/10 and felt exam still consistent with mucositis rather than fungal involvement. Improving.   - Continue Oxycodone TID  - Continue peridex and magic mouthwash PRN     # Generalized body pain: resolving  - Musculoskeletal aches: PT involved, overall greatly improved  - Neuropathic pain: s/p valium. Completed Gabapentin wean on 9/23.      # Bilateral retinal hemorrhages. Opthalmology revaluated Antony 9/17, no  evidence of occular fungal infection. Worsening bilateral retinal hemorrhages noted, none involving central macula or impacting vision   - Repeat dilated eye exam in 3-4 weeks (between 10/8-10/15) or upon vision change- to be scheduled as outpatient     # Insomia:   - Continue melatonin at bedtime. No longer taking zyprexa, effects too long lasting and he is still sleepy in the morning.      # Depression/mood disorder/anxiety: Stable.  - Zoloft 200 mg daily (inc 9/17)     Derm:  # Rash, non-specific: Resolved.     # Perianal skin breakdown: Resolved. Using A/D until completely healed.     # Access: DL CVC. (PICC removed 9/15)    Disposition: RTC 9/27 for labs, possible platelet transfusion, exam with FARHAT.    WILDER Gastelum  [facility]  Pediatric Blood and Marrow Transplant  [phone]  Pager  [phone]  [facility]  [phone]  [facility] hospital workroom      Patient Active Problem List   Diagnosis     Fanconi's anemia (H)     Multiple nevi     Café au lait spot     Short stature associated with congenital syndrome     Pubertal delay     Cytopenia     Rectal or anal pain     Malaise and fatigue     Hemorrhagic cystitis     Bone marrow transplant candidate     Failure of stem cell transplant (H)     Hx of stem cell transplant (H)     Generalized pain     Neutropenia (H)     Fluid overload     Thrombocytopenia (H)     Peripheral polyneuropathy     Central pain syndrome     Acute kidney failure, unspecified (H)          Vomiting/nausea

## 2023-01-07 NOTE — ED PROVIDER NOTE - PROGRESS NOTE DETAILS
CT shows Pancreatic head mass measuring up to 5 cm, with associated pancreatic and   biliary ductal dilatation, as well as extensive hepatic metastasis. There   is associated partial encasement of the SMA, SMV and SMV portal   confluence.  All results reviewed with patient who has oncology follow up this Tuesday. pt given copy of all results.  Will dc

## 2023-01-07 NOTE — ED PROVIDER NOTE - OBJECTIVE STATEMENT
72M, pmh of HTN, Afib recently dx stage IV pancreatic Ca, presenting with three days of nausea, vomiting, and inability to tolerate PO. denies any fever, chest pain, trouble breathing, abdominal pain.

## 2023-01-07 NOTE — ED ADULT NURSE NOTE - NSIMPLEMENTINTERV_GEN_ALL_ED
Implemented All Fall Risk Interventions:  [place] to call system. Call bell, personal items and telephone within reach. Instruct patient to call for assistance. Room bathroom lighting operational. Non-slip footwear when patient is off stretcher. Physically safe environment: no spills, clutter or unnecessary equipment. Stretcher in lowest position, wheels locked, appropriate side rails in place. Provide visual cue, wrist band, yellow gown, etc. Monitor gait and stability. Monitor for mental status changes and reorient to person, place, and time. Review medications for side effects contributing to fall risk. Reinforce activity limits and safety measures with patient and family.

## 2023-01-09 LAB
CULTURE RESULTS: SIGNIFICANT CHANGE UP
SPECIMEN SOURCE: SIGNIFICANT CHANGE UP

## 2023-01-16 NOTE — ED PROVIDER NOTE - IV ALTEPLASE DOOR HIDDEN
Medication requested: atorvastatin     Last visit was:  9/20/22     Next visit is:   Not scheduled    Last fill was:   1/10/22 QTY 90 with 3 refills    Per TE  1/12/23 the daughter stated that they are starting hospice care for the patient.      show

## 2023-01-18 NOTE — ED PROVIDER NOTE - NEUROLOGICAL, MLM
Chart reviewed, immunization record updated.  No new results noted on [facility] or [facility] web site.  Care Everywhere updated.   Patient care coordination note  LOV with PCP 12/12/2022.  Lipid Panel order placed.  Spoke to patient, scheduled appointment with Dr. Vimal Lopes (Gyn) on 3/20/2023 at 3:00pm, appointment details mailed to patient.  
Alert and oriented, no focal deficits, no motor or sensory deficits.

## 2023-01-27 ENCOUNTER — INPATIENT (INPATIENT)
Facility: HOSPITAL | Age: 73
LOS: 0 days | DRG: 951 | End: 2023-01-28
Attending: INTERNAL MEDICINE | Admitting: INTERNAL MEDICINE
Payer: MEDICARE

## 2023-01-27 VITALS — OXYGEN SATURATION: 77 % | WEIGHT: 145.06 LBS | HEART RATE: 129 BPM | HEIGHT: 69 IN | RESPIRATION RATE: 27 BRPM

## 2023-01-27 LAB
ALBUMIN SERPL ELPH-MCNC: 1.7 G/DL — LOW (ref 3.5–5)
ALP SERPL-CCNC: 904 U/L — HIGH (ref 40–120)
ALT FLD-CCNC: 495 U/L DA — HIGH (ref 10–60)
ANION GAP SERPL CALC-SCNC: 17 MMOL/L — SIGNIFICANT CHANGE UP (ref 5–17)
AST SERPL-CCNC: 755 U/L — HIGH (ref 10–40)
BILIRUB SERPL-MCNC: 18.4 MG/DL — HIGH (ref 0.2–1.2)
BUN SERPL-MCNC: 24 MG/DL — HIGH (ref 7–18)
CALCIUM SERPL-MCNC: 8.3 MG/DL — LOW (ref 8.4–10.5)
CHLORIDE SERPL-SCNC: 105 MMOL/L — SIGNIFICANT CHANGE UP (ref 96–108)
CO2 SERPL-SCNC: 17 MMOL/L — LOW (ref 22–31)
CREAT SERPL-MCNC: 1.84 MG/DL — HIGH (ref 0.5–1.3)
EGFR: 38 ML/MIN/1.73M2 — LOW
GLUCOSE SERPL-MCNC: 76 MG/DL — SIGNIFICANT CHANGE UP (ref 70–99)
HCT VFR BLD CALC: 31.8 % — LOW (ref 39–50)
HGB BLD-MCNC: 10.2 G/DL — LOW (ref 13–17)
LACTATE SERPL-SCNC: 12.6 MMOL/L — CRITICAL HIGH (ref 0.7–2)
MCHC RBC-ENTMCNC: 31.2 PG — SIGNIFICANT CHANGE UP (ref 27–34)
MCHC RBC-ENTMCNC: 32.1 GM/DL — SIGNIFICANT CHANGE UP (ref 32–36)
MCV RBC AUTO: 97.2 FL — SIGNIFICANT CHANGE UP (ref 80–100)
PLATELET # BLD AUTO: 205 K/UL — SIGNIFICANT CHANGE UP (ref 150–400)
POTASSIUM SERPL-MCNC: 5.3 MMOL/L — SIGNIFICANT CHANGE UP (ref 3.5–5.3)
POTASSIUM SERPL-SCNC: 5.3 MMOL/L — SIGNIFICANT CHANGE UP (ref 3.5–5.3)
PROT SERPL-MCNC: 4.5 G/DL — LOW (ref 6–8.3)
RBC # BLD: 3.27 M/UL — LOW (ref 4.2–5.8)
RBC # FLD: 21.7 % — HIGH (ref 10.3–14.5)
SODIUM SERPL-SCNC: 139 MMOL/L — SIGNIFICANT CHANGE UP (ref 135–145)
TROPONIN I, HIGH SENSITIVITY RESULT: 52.1 NG/L — SIGNIFICANT CHANGE UP
WBC # BLD: 8.27 K/UL — SIGNIFICANT CHANGE UP (ref 3.8–10.5)
WBC # FLD AUTO: 8.27 K/UL — SIGNIFICANT CHANGE UP (ref 3.8–10.5)

## 2023-01-27 PROCEDURE — 99291 CRITICAL CARE FIRST HOUR: CPT | Mod: CS

## 2023-01-27 PROCEDURE — 71045 X-RAY EXAM CHEST 1 VIEW: CPT | Mod: 26

## 2023-01-27 RX ORDER — SODIUM CHLORIDE 9 MG/ML
2000 INJECTION INTRAMUSCULAR; INTRAVENOUS; SUBCUTANEOUS ONCE
Refills: 0 | Status: COMPLETED | OUTPATIENT
Start: 2023-01-27 | End: 2023-01-27

## 2023-01-27 RX ORDER — VANCOMYCIN HCL 1 G
1250 VIAL (EA) INTRAVENOUS ONCE
Refills: 0 | Status: COMPLETED | OUTPATIENT
Start: 2023-01-27 | End: 2023-01-27

## 2023-01-27 RX ORDER — NOREPINEPHRINE BITARTRATE/D5W 8 MG/250ML
0.05 PLASTIC BAG, INJECTION (ML) INTRAVENOUS
Qty: 8 | Refills: 0 | Status: DISCONTINUED | OUTPATIENT
Start: 2023-01-27 | End: 2023-01-28

## 2023-01-27 RX ORDER — CEFEPIME 1 G/1
1000 INJECTION, POWDER, FOR SOLUTION INTRAMUSCULAR; INTRAVENOUS ONCE
Refills: 0 | Status: COMPLETED | OUTPATIENT
Start: 2023-01-27 | End: 2023-01-27

## 2023-01-27 RX ORDER — FENTANYL CITRATE 50 UG/ML
50 INJECTION INTRAVENOUS ONCE
Refills: 0 | Status: DISCONTINUED | OUTPATIENT
Start: 2023-01-27 | End: 2023-01-27

## 2023-01-27 RX ADMIN — CEFEPIME 100 MILLIGRAM(S): 1 INJECTION, POWDER, FOR SOLUTION INTRAMUSCULAR; INTRAVENOUS at 22:58

## 2023-01-27 RX ADMIN — Medication 6.17 MICROGRAM(S)/KG/MIN: at 23:47

## 2023-01-27 RX ADMIN — SODIUM CHLORIDE 2000 MILLILITER(S): 9 INJECTION INTRAMUSCULAR; INTRAVENOUS; SUBCUTANEOUS at 22:50

## 2023-01-27 RX ADMIN — Medication 166.67 MILLIGRAM(S): at 23:47

## 2023-01-27 NOTE — ED PROVIDER NOTE - NS ED MD TWO NIGHTS YN
"  Bipolar Disorder  #1 Rest, fluids  #2 You need to hand in the information that Roselyn, our financial counselor, has asked for numerous times. This needs to be done for your application to go thru with Medicaid. I have also printed up information for [facility], [facility], which is low cost clinic in [facility]. I have printed how to get there by private vehicle and also by bus transport from [place].     I also printed off information for [facility] in [facility], [address] which has behavioral health and family medicine. You have now received numerous resources and now need to follow through with following up for further evaluation. Ativan 1 mg every 8 hours as needed for anxiety and stress (sedating, no driving, no operating machinery while on medication), the ER will not refill benzodiazepines or start any psychiatric medications. #3 Follow up with information as planned, I also referred you to family medicine and behavioral health through [facility] system  #4 Return if worsen, I.e if symptoms are new not chronic, fever, vomiting, after evaluation by primary care provider    These are provisional diagnosis that can and may change. Bipolar disorder is an illness that causes strong mood swings between depression and âmaniaâ. It used to be called ""manic depression. \"" The mood swings are different from the normal ups and downs we all normally experience in our lives. They are more severe, last longer, and can interfere with work and relationships. These episodes are changes from our usual moods and behavior. Their severity can be mild, or drastic and explosive. Â· In a manic episode, you may think fast and do things quickly. It may seem like you are getting a lot done. At first, this may feel very good. But in the extreme this can lead to a lifestyle that is disorganized, chaotic, and includes risky behavior (spending sprees, sexual acting-out, or drug use).  In later stages, it may " affect eating (no interest in food) and sleeping (unable to sleep for days at a time). Speech may speed up and become difficult for others to understand. You may appear to others as if you are in your own world. Â· In a depressive episode, you may feel a lack of interest in normal activities. Sometimes there is sadness or guilt without any clear reason. Thinking may become slow and there can be a lack energy or feeling of hopelessness. Some people have thoughts of harming themselves at this stage. Thoughts can even turn to suicide. Between these phases you may actually feel OK. This does not mean that the illness is gone. People with this disorder will usually have to treat it all of their life. Medicine and good care can greatly reduce the symptoms. The exact cause of this illness is unknown. However, there is a genetic link that makes a person more likely to get this problem. Also, the use of drugs such as speed (amphetamine) and cocaine increase the chances of this illness appearing. Home care  Â· On-going care and support helps people manage this disease. Â Find a healthcare provider and therapist who meet your needs. Â Seek help when you feel like you may be heading into either a manic episode or a depressive state. Â· Be sure to take your medicine and get regular blood work to check your the levels of medicine in your body. Â Take the medicine and get the follow-up lab work as prescribed,Â even if you think you donât need to do it. Â· Be certain to tell each of your healthcare providers about all of the prescription drugs, over-the-counter medicines, and supplements you take. Â Certain supplements interact with medicines and result in dangerous side effects. Â  You can also use your pharmacist as a resource person when you have questions about drug interactions. Â· Talk with your family and trusted friends about your thoughts and feelings.  Â Ask them to help you recognize behavior changes early so you can get help and medicines can be adjusted. Â· Alcohol and drugs can bring on an episode, and also make them worse  Â· If your life is severely impacted by this illness, the Americans with Disabilities Act (ADA) may provide help. The ADA protects people with chronic physical and mental health problems. If you are having trouble keeping jobs, managing workplace issues, or caring for yourself because of your bipolar disorder, contact your local ADA office to see if they can help. [facility] [address] [facility] operates a toll-free ADA information line at: 938.877.9550 (Voice); or 222-481-2369 (TTY). [employer] can help you locate a local office. Â   Follow-up care  Follow up with your doctor or therapist as advised. They can help you to find ways to improve your life. Call 911  Call your healthcare provider right away if any of these occur:  Â· You have suicidal thoughts, a plan, and the means toÂ  harm yourself  Â· Trouble breathing  Â· Confusion  Â· Drowsiness or trouble wakening  Â· Fainting or loss of consciousness  Â· Rapid heart rate, very low heart rate, or a new irregular heart rate  Â· Seizure  Â· New chest pain that becomes more severe, lasts longer, or spreads into your shoulder, arm, neck, jaw, or back  When to seek medical advice  Call your healthcare provider right away if any of these occur:  Â· Feeling like your symptoms are getting worse (depression, agitation, excess energy)  Â· Unable to eat or sleep for more than 48 hours  Â· Feeling out of control (racing thoughts, poor concentration)  Â· Feeling like you want to harm yourself or another  Â· Being unable to care for yourself  Â© 6463-6134 The [address] [facility], [address] [place] [address]. All rights reserved. This information is not intended as a substitute for professional medical care. Always follow your healthcare professional's instructions. Insomnia  Insomnia refers to a difficulty going to sleep or staying asleep, or both.  Insomnia has many causes, including anxiety, stress, depression, chronic pain, sleeping cycles out of balance due to working night shifts or excess napping during the day, and a condition called âsleep apneaâ. Insomnia can be a side effect from stimulant medicines such as decongestants, asthma inhalers and pills, diet pills, and illegal drugs such as speed, crank, crack, and PCP. Home Care:  1. Review your medicines with your doctor or pharmacist to find out if they can cause insomnia. 2. Caffeine, smoking and alcohol also affect sleep. Limit your daily use and do not use these before bedtime. Alcohol may make you sleepy at first, but as its effects wear off, you may awaken a few hours later and have trouble returning to sleep. 3. Do not exercise, eat or drink large amounts of liquid within 2 hours of your bedtime. 4. Improve your sleep habits. Have a fixed bed and wake-up time. Try to keep noise, light and heat in your bedroom at a comfortable level. Try using earplugs or eyeshades if needed. Â   5. Avoid watching TV in bed. 6. If you do not fall asleep within 30 minutes, try to relax by reading or listening to soft music. 7. Limit daytime napping to one 30 minute period, early in the day. 8. Get regular exercise. Find other ways to lessen your stress level. 9. If a medicine was prescribed to help reset your sleep patterns, take it as directed. Sleeping pills are intended for short-term use, only. If taken for too long, the effect wears off while the risk of physical addiction and psychological dependence increases. Follow-Up  with your doctor or as directed by our staff if you feel that your insomnia is not responding to the above measures.   Get Prompt Medical Attention  if any of the following occur:  Â· Extreme restlessness or irritability  Â· Confusion or hallucinations (seeing or hearing things that are not there)  Â· Anxiety, depression  Â· Several days without sleeping  Â© 9280-2068 The [address] [place] [address] 1 [facility], [place], [address] All rights reserved. This information is not intended as a substitute for professional medical care. Always follow your healthcare professional's instructions. Yes

## 2023-01-27 NOTE — ED PROVIDER NOTE - CLINICAL SUMMARY MEDICAL DECISION MAKING FREE TEXT BOX
Pt p/w lethargy and unresponsiveness with likely sepsis. Wife clearly states DNR/DNI with MOLST documentation in EMR. Giving fluids and early antibiotics in line with pt and family wishes. Labs and imaging pending. Pt status critical. Pt p/w lethargy and unresponsiveness with likely sepsis. Wife clearly states DNR/DNI with MOLST documentation in EMR. Giving fluids and early antibiotics in line with pt and family wishes. Labs and imaging pending. Pt status critical.    Chucho 0013:  Norepi started for persistent hypotension after 3L NS. Lactate 12. CXR concerning for PNA. Bishop placed and no urine in bladder. On further discussion with wife she has asked that pt's comfort be the main priority and does not want a central line procedure or other procedures that may cause more discomfort to be performed. Pt becoming more responsive and now moaning and appears uncomfortable. Giving fentanyl. Spoke with admitting attending Dr Ocasio and he has accepted admission. Pt likely to  imminently and wife calling family members to come to the hospital. D/w MAR and they will d/c norepi when family arrives as pt is unlikely to maintain a blood pressure without norepinephrine. Pt status critical and endorsed to inpatient team.

## 2023-01-27 NOTE — ED PROVIDER NOTE - CRITICAL CARE ATTENDING CONTRIBUTION TO CARE
Pt requiring extensive bedside care, vasopressor for hypotension, and extended goals of care conversations with family.

## 2023-01-27 NOTE — ED PROVIDER NOTE - OBJECTIVE STATEMENT
71 yo M h/o pancreatic cancer with metastatic dz, afib on pradaxa p/w unresponsiveness after recent inability to urinate. Wife at bedside (Divya) states that he is DNR/DNI and no longer receiving any cancer tx. Wife denies recent fever/ N/V/ pain complaints, focal numbness/weakness, syncope, other recent illness or hospitalizations.

## 2023-01-27 NOTE — ED PROVIDER NOTE - PHYSICAL EXAMINATION
Vital Signs Reviewed  GEN: Severe jaundice, Moans to painful stimuli  HEENT: NCAT, PERRLA, Scleral icterus, Dry mucus membranes  RESP: CTAB, No rales/rhonchi/wheezing  CV: Tachycardia, S1S2, No murmurs  ABD: No TTP, Soft, ND, No masses, No CVA Tenderness  Extrem/Skin: Severe jaundice, Equal pulses bilat, No cyanosis/edema

## 2023-01-28 ENCOUNTER — TRANSCRIPTION ENCOUNTER (OUTPATIENT)
Age: 73
End: 2023-01-28

## 2023-01-28 VITALS
DIASTOLIC BLOOD PRESSURE: 90 MMHG | OXYGEN SATURATION: 95 % | TEMPERATURE: 98 F | SYSTOLIC BLOOD PRESSURE: 80 MMHG | RESPIRATION RATE: 30 BRPM | HEART RATE: 87 BPM

## 2023-01-28 DIAGNOSIS — K11.7 DISTURBANCES OF SALIVARY SECRETION: ICD-10-CM

## 2023-01-28 DIAGNOSIS — Z51.5 ENCOUNTER FOR PALLIATIVE CARE: ICD-10-CM

## 2023-01-28 DIAGNOSIS — Z71.89 OTHER SPECIFIED COUNSELING: ICD-10-CM

## 2023-01-28 DIAGNOSIS — C25.9 MALIGNANT NEOPLASM OF PANCREAS, UNSPECIFIED: ICD-10-CM

## 2023-01-28 DIAGNOSIS — Z29.9 ENCOUNTER FOR PROPHYLACTIC MEASURES, UNSPECIFIED: ICD-10-CM

## 2023-01-28 DIAGNOSIS — F41.9 ANXIETY DISORDER, UNSPECIFIED: ICD-10-CM

## 2023-01-28 DIAGNOSIS — J18.9 PNEUMONIA, UNSPECIFIED ORGANISM: ICD-10-CM

## 2023-01-28 DIAGNOSIS — R53.2 FUNCTIONAL QUADRIPLEGIA: ICD-10-CM

## 2023-01-28 DIAGNOSIS — A41.9 SEPSIS, UNSPECIFIED ORGANISM: ICD-10-CM

## 2023-01-28 DIAGNOSIS — R06.00 DYSPNEA, UNSPECIFIED: ICD-10-CM

## 2023-01-28 LAB
ACANTHOCYTES BLD QL SMEAR: SLIGHT — SIGNIFICANT CHANGE UP
ANISOCYTOSIS BLD QL: SLIGHT — SIGNIFICANT CHANGE UP
BASOPHILS # BLD AUTO: 0 K/UL — SIGNIFICANT CHANGE UP (ref 0–0.2)
BASOPHILS NFR BLD AUTO: 0 % — SIGNIFICANT CHANGE UP (ref 0–2)
EOSINOPHIL # BLD AUTO: 0 K/UL — SIGNIFICANT CHANGE UP (ref 0–0.5)
EOSINOPHIL NFR BLD AUTO: 0 % — SIGNIFICANT CHANGE UP (ref 0–6)
FLUAV AG NPH QL: SIGNIFICANT CHANGE UP
FLUBV AG NPH QL: SIGNIFICANT CHANGE UP
HYPOCHROMIA BLD QL: SLIGHT — SIGNIFICANT CHANGE UP
LYMPHOCYTES # BLD AUTO: 0.58 K/UL — LOW (ref 1–3.3)
LYMPHOCYTES # BLD AUTO: 7 % — LOW (ref 13–44)
MACROCYTES BLD QL: SLIGHT — SIGNIFICANT CHANGE UP
MANUAL SMEAR VERIFICATION: SIGNIFICANT CHANGE UP
METAMYELOCYTES # FLD: 1 % — HIGH (ref 0–0)
MONOCYTES # BLD AUTO: 0.74 K/UL — SIGNIFICANT CHANGE UP (ref 0–0.9)
MONOCYTES NFR BLD AUTO: 9 % — SIGNIFICANT CHANGE UP (ref 2–14)
NEUTROPHILS # BLD AUTO: 6.86 K/UL — SIGNIFICANT CHANGE UP (ref 1.8–7.4)
NEUTROPHILS NFR BLD AUTO: 79 % — HIGH (ref 43–77)
NEUTS BAND # BLD: 4 % — SIGNIFICANT CHANGE UP (ref 0–8)
NRBC # BLD: 4 /100 — HIGH (ref 0–0)
PLAT MORPH BLD: NORMAL — SIGNIFICANT CHANGE UP
PLATELET COUNT - ESTIMATE: NORMAL — SIGNIFICANT CHANGE UP
POIKILOCYTOSIS BLD QL AUTO: SLIGHT — SIGNIFICANT CHANGE UP
POLYCHROMASIA BLD QL SMEAR: SLIGHT — SIGNIFICANT CHANGE UP
RBC BLD AUTO: ABNORMAL
SARS-COV-2 RNA SPEC QL NAA+PROBE: SIGNIFICANT CHANGE UP
SCHISTOCYTES BLD QL AUTO: SLIGHT — SIGNIFICANT CHANGE UP

## 2023-01-28 PROCEDURE — 99223 1ST HOSP IP/OBS HIGH 75: CPT

## 2023-01-28 PROCEDURE — 87635 SARS-COV-2 COVID-19 AMP PRB: CPT

## 2023-01-28 PROCEDURE — 83605 ASSAY OF LACTIC ACID: CPT

## 2023-01-28 PROCEDURE — 99497 ADVNCD CARE PLAN 30 MIN: CPT | Mod: 25

## 2023-01-28 PROCEDURE — 80053 COMPREHEN METABOLIC PANEL: CPT

## 2023-01-28 PROCEDURE — 87637 SARSCOV2&INF A&B&RSV AMP PRB: CPT

## 2023-01-28 PROCEDURE — 85025 COMPLETE CBC W/AUTO DIFF WBC: CPT

## 2023-01-28 PROCEDURE — 36415 COLL VENOUS BLD VENIPUNCTURE: CPT

## 2023-01-28 PROCEDURE — 93005 ELECTROCARDIOGRAM TRACING: CPT

## 2023-01-28 PROCEDURE — 84484 ASSAY OF TROPONIN QUANT: CPT

## 2023-01-28 PROCEDURE — 99285 EMERGENCY DEPT VISIT HI MDM: CPT | Mod: 25

## 2023-01-28 PROCEDURE — 87040 BLOOD CULTURE FOR BACTERIA: CPT

## 2023-01-28 PROCEDURE — 71045 X-RAY EXAM CHEST 1 VIEW: CPT

## 2023-01-28 PROCEDURE — 82962 GLUCOSE BLOOD TEST: CPT

## 2023-01-28 RX ORDER — ROBINUL 0.2 MG/ML
0.4 INJECTION INTRAMUSCULAR; INTRAVENOUS EVERY 4 HOURS
Refills: 0 | Status: DISCONTINUED | OUTPATIENT
Start: 2023-01-28 | End: 2023-01-28

## 2023-01-28 RX ORDER — HYDROMORPHONE HYDROCHLORIDE 2 MG/ML
2 INJECTION INTRAMUSCULAR; INTRAVENOUS; SUBCUTANEOUS
Refills: 0 | Status: DISCONTINUED | OUTPATIENT
Start: 2023-01-28 | End: 2023-01-28

## 2023-01-28 RX ADMIN — HYDROMORPHONE HYDROCHLORIDE 2 MILLIGRAM(S): 2 INJECTION INTRAMUSCULAR; INTRAVENOUS; SUBCUTANEOUS at 02:23

## 2023-01-28 RX ADMIN — HYDROMORPHONE HYDROCHLORIDE 2 MILLIGRAM(S): 2 INJECTION INTRAMUSCULAR; INTRAVENOUS; SUBCUTANEOUS at 09:02

## 2023-01-28 RX ADMIN — FENTANYL CITRATE 50 MICROGRAM(S): 50 INJECTION INTRAVENOUS at 00:07

## 2023-01-28 RX ADMIN — HYDROMORPHONE HYDROCHLORIDE 2 MILLIGRAM(S): 2 INJECTION INTRAMUSCULAR; INTRAVENOUS; SUBCUTANEOUS at 08:47

## 2023-01-28 RX ADMIN — HYDROMORPHONE HYDROCHLORIDE 2 MILLIGRAM(S): 2 INJECTION INTRAMUSCULAR; INTRAVENOUS; SUBCUTANEOUS at 07:52

## 2023-01-28 RX ADMIN — HYDROMORPHONE HYDROCHLORIDE 2 MILLIGRAM(S): 2 INJECTION INTRAMUSCULAR; INTRAVENOUS; SUBCUTANEOUS at 02:18

## 2023-01-28 RX ADMIN — HYDROMORPHONE HYDROCHLORIDE 2 MILLIGRAM(S): 2 INJECTION INTRAMUSCULAR; INTRAVENOUS; SUBCUTANEOUS at 07:22

## 2023-01-28 RX ADMIN — FENTANYL CITRATE 50 MICROGRAM(S): 50 INJECTION INTRAVENOUS at 02:23

## 2023-01-28 NOTE — ED ADULT NURSE REASSESSMENT NOTE - NS ED NURSE REASSESS COMMENT FT1
per provider to stop levophed, and wife took of the oxygen due to pt wanting the oxygen off.
pt is laying down comfortable, wife is at bedside, oxygen, cardiac monitor, and levophed were asked to be removed off of pt per admitting provider, per wife she is comfortable and so is the pt.

## 2023-01-28 NOTE — H&P ADULT - NSHPPHYSICALEXAM_GEN_ALL_CORE
PHYSICAL EXAMINATION:  GENERAL: jaundice, in distress   HEAD:  Atraumatic, Normocephalic  EYES:  conjunctiva and sclera icterus   NECK: Supple, No JVD, Normal thyroid  CHEST/LUNG: Clear to auscultation. Clear to percussion bilaterally; No rales, rhonchi, wheezing, or rubs  HEART: Regular rate and rhythm; No murmurs, rubs, or gallops  ABDOMEN: Soft, Nontender, Nondistended; Bowel sounds present  NERVOUS SYSTEM:  Alert & Oriented X0,    EXTREMITIES:  2+ Peripheral Pulses, No clubbing, cyanosis, or edema  SKIN: warm dry

## 2023-01-28 NOTE — H&P ADULT - PROBLEM SELECTOR PLAN 5
hx of metastatic pancreatic cancer (unable to tolerate chemo)  Patient is comfort measure only  c/w Dilaudid and glycopyrrolate as needed   Consulted palliative

## 2023-01-28 NOTE — CONSULT NOTE ADULT - PROBLEM SELECTOR RECOMMENDATION 6
.  metastatic, at end of life  - not candidate for further DMT  - comfort care, mews exempt  - inpatient hospice eligible

## 2023-01-28 NOTE — H&P ADULT - CONVERSATION DETAILS
Spoke with wife Sandra at bedside regarding patient's diagnosis,  prognosis, and treatment. Wife states that patient was recently taken off chemotherapy because he was unable to tolerate it. Wife  wants to make the patient as comfortable as possible and does not want any medications, labs drawn, or IVs. MOLST form was signed and patient is comfort measures only. Spoke with wife Sandra at bedside regarding patient's diagnosis,  prognosis, and treatment. Wife states that patient was recently taken off chemotherapy because he was unable to tolerate it. Wife  wants to make the patient as comfortable as possible and does not want any medications, labs drawn, Bishop, or IVs. MOLST form was signed and patient is comfort measures only.

## 2023-01-28 NOTE — ED ADULT NURSE NOTE - OBJECTIVE STATEMENT
pt presents with lethargy, responsive to painful stimuli only, pt is a late stage pancreatic Cancer pt with mets to the liver, has DNR and DNI status (as per pt's wife), pt is tachypneic, hypotensive 45/25 and grossly jaundiced

## 2023-01-28 NOTE — CONSULT NOTE ADULT - PROBLEM SELECTOR RECOMMENDATION 7
.  - dnr dni  - wife Divya is decision making surrogate    In addition to the E/M visit, an advance care planning meeting was performed. Start time: 1130 ; End time: 1230; Total time: 30min. A face to face meeting to discuss advance care planning was held today regarding: ALYSIA REEDER. Primary decision maker:  Patient is unable to participate in decision making;  Alternate/surrogate:  wife Candice  . Discussed advance directives including, but not limited to, healthcare proxy and code status, as well as disease trajectory, patient's values/goals, and health care options that are available for end of life care. Decision regarding code status: DNR/DNI; Documentation completed today:  [facility] note

## 2023-01-28 NOTE — PROGRESS NOTE ADULT - SUBJECTIVE AND OBJECTIVE BOX
HPI:  71 yo M h/o pancreatic cancer with metastatic dz, afib on Pradaxa p/w unresponsiveness after recent inability to urinate. Wife at bedside (Divya) states that he is DNR/DNI and no longer receiving any cancer tx.Wife denies recent fever/ N/V/ pain complaints, focal numbness/weakness, syncope, other recent illness or hospitalizations. (28 Jan 2023 01:43)      Patient is a 72y old  Male who presents with a chief complaint of Severe sepsis, multi-organ failure (28 Jan 2023 13:55)      INTERVAL HPI/OVERNIGHT EVENTS:  T(C): 36.6 (01-28-23 @ 08:37), Max: 37.3 (01-27-23 @ 23:02)  HR: 87 (01-28-23 @ 08:37) (87 - 135)  BP: 80/90 (01-28-23 @ 08:37) (49/41 - 86/56)  RR: 30 (01-28-23 @ 08:37) (27 - 40)  SpO2: 95% (01-28-23 @ 08:37) (77% - 95%)  Wt(kg): --  I&O's Summary      REVIEW OF SYSTEMS: denies fever, chills, SOB, palpitations, chest pain, abdominal pain, nausea, vomitting, diarrhea, constipation, dizziness    MEDICATIONS  (STANDING):    MEDICATIONS  (PRN):  glycopyrrolate Injectable 0.4 milliGRAM(s) IV Push every 4 hours PRN secretions  HYDROmorphone  Injectable 2 milliGRAM(s) IV Push every 2 hours PRN Moderate Pain (4 - 6)  HYDROmorphone  Injectable 2 milliGRAM(s) IV Push every 1 hour PRN Severe Pain (7 - 10)      PHYSICAL EXAM:  GENERAL: NAD, well-groomed, well-developed  HEAD:  Atraumatic, Normocephalic  EYES: EOMI, PERRLA, conjunctiva and sclera clear  ENMT: No tonsillar erythema, exudates, or enlargement; Moist mucous membranes, Good dentition, No lesions  NECK: Supple, No JVD, Normal thyroid  NERVOUS SYSTEM:  Alert & Oriented X3, Good concentration; Motor Strength 5/5 B/L upper and lower extremities; DTRs 2+ intact and symmetric  CHEST/LUNG: Clear to percussion bilaterally; No rales, rhonchi, wheezing, or rubs  HEART: Regular rate and rhythm; No murmurs, rubs, or gallops  ABDOMEN: Soft, Nontender, Nondistended; Bowel sounds present  EXTREMITIES:  2+ Peripheral Pulses, No clubbing, cyanosis, or edema  LYMPH: No lymphadenopathy noted  SKIN: No rashes or lesions  LABS:                        10.2   8.27  )-----------( 205      ( 27 Jan 2023 22:50 )             31.8     01-27    139  |  105  |  24<H>  ----------------------------<  76  5.3   |  17<L>  |  1.84<H>    Ca    8.3<L>      27 Jan 2023 22:50    TPro  4.5<L>  /  Alb  1.7<L>  /  TBili  18.4<H>  /  DBili  x   /  AST  755<H>  /  ALT  495<H>  /  AlkPhos  904<H>  01-27        CAPILLARY BLOOD GLUCOSE      POCT Blood Glucose.: 85 mg/dL (27 Jan 2023 23:25)  POCT Blood Glucose.: 70 mg/dL (27 Jan 2023 22:21)

## 2023-01-28 NOTE — H&P ADULT - HISTORY OF PRESENT ILLNESS
73 yo M h/o pancreatic cancer with metastatic dz, afib on Pradaxa p/w unresponsiveness after recent inability to urinate. Wife at bedside (Divya) states that he is DNR/DNI and no longer receiving any cancer tx.Wife denies recent fever/ N/V/ pain complaints, focal numbness/weakness, syncope, other recent illness or hospitalizations.

## 2023-01-28 NOTE — H&P ADULT - NSHPLABSRESULTS_GEN_ALL_CORE
LABS:                        10.2   8.27  )-----------( 205      ( 27 Jan 2023 22:50 )             31.8     01-27    139  |  105  |  24<H>  ----------------------------<  76  5.3   |  17<L>  |  1.84<H>    Ca    8.3<L>      27 Jan 2023 22:50    TPro  4.5<L>  /  Alb  1.7<L>  /  TBili  18.4<H>  /  DBili  x   /  AST  755<H>  /  ALT  495<H>  /  AlkPhos  904<H>  01-27        LIVER FUNCTIONS - ( 27 Jan 2023 22:50 )  Alb: 1.7 g/dL / Pro: 4.5 g/dL / ALK PHOS: 904 U/L / ALT: 495 U/L DA / AST: 755 U/L / GGT: x           Lactate, Blood: 12.6 mmol/L (01-27-23 @ 22:50)

## 2023-01-28 NOTE — ED ADULT NURSE NOTE - NSIMPLEMENTINTERV_GEN_ALL_ED
Implemented All Fall with Harm Risk Interventions:  Skippers to call system. Call bell, personal items and telephone within reach. Instruct patient to call for assistance. Room bathroom lighting operational. Non-slip footwear when patient is off stretcher. Physically safe environment: no spills, clutter or unnecessary equipment. Stretcher in lowest position, wheels locked, appropriate side rails in place. Provide visual cue, wrist band, yellow gown, etc. Monitor gait and stability. Monitor for mental status changes and reorient to person, place, and time. Review medications for side effects contributing to fall risk. Reinforce activity limits and safety measures with patient and family. Provide visual clues: red socks.

## 2023-01-28 NOTE — CONSULT NOTE ADULT - CONVERSATION DETAILS
Pt unresponsive and unable to participate in discussion. Met with wife and legal surrogate. Reviewed pts cancer hx, pt no longer candidate for DMT, and emphasized that pt is in last hours to days of life. Wife tearful, hoping for a miracle, but appears to understand pts grave prognosis.     Pt is DNR DNI and wife maintains that comfort through the dying process is what is most important to her. She is in agreement with use of opioids for dyspnea/pain and anxiolytics for anxiety to ensure comfort through the end of pts life.     Broached inpatient hospice as an added layer of support, wife is not ready to make a decision.     Extensive support provided.

## 2023-01-28 NOTE — CONSULT NOTE ADULT - PROBLEM SELECTOR RECOMMENDATION 2
.  comfortable on exam, but at risk for air hunger and anxiety as pt continues through the dying process  - recommend ativan 0.5 mg IV q2 PRN   - Low threshold to repeat dose or increase PRN frequency as needed to maximize comfort through the end of the patient's life

## 2023-01-28 NOTE — H&P ADULT - PROBLEM SELECTOR PLAN 1
hx of metastatic pancreatic cancer (unable to tolerate chemo)  presents with AMS  Found to have possible PNA  s/p 2L bolus with BP in the 60s, was started on levophed  Patient is comfort measure only  c/w Dilaudid and glycopyrrolate as needed   Consulted pallative hx of metastatic pancreatic cancer (unable to tolerate chemo)  Poor functional status  presents with AMS  Found to have possible PNA  s/p 2L bolus with BP in the 60s, was started on levophed  Patient is comfort measure only  c/w Dilaudid and glycopyrrolate as needed   Consulted palliative

## 2023-01-28 NOTE — ED ADULT NURSE NOTE - NS ED NOTE ABUSE RESPONSE YN
Surgery is scheduled with Dr. Karine Herndon at [address] [facility]  on 11-11. Unable to assess due to medical condition

## 2023-01-28 NOTE — H&P ADULT - PROBLEM SELECTOR PLAN 3
hx of metastatic pancreatic cancer (unable to tolerate chemo)  Patient is comfort measure only  c/w Dilaudid and glycopyrrolate as needed   Consulted palliative hx of metastatic pancreatic cancer (unable to tolerate chemo)  presents with AMS  Found to have possible PNA  s/p 2L bolus with BP in the 60s, was started on levophed  Patient is comfort measure only  c/w Dilaudid and glycopyrrolate as needed   Consulted palliative

## 2023-01-28 NOTE — CONSULT NOTE ADULT - PROBLEM SELECTOR RECOMMENDATION 9
.  comfortable w intermittent BT dyspnea on NRB. no unexpected side effects to opioids noted  - cw fent 50 mcg /hr q72   - cw dilaudid 2 mg IV q1 PRN pain or RR>20  - Low threshold to repeat dose or increase PRN frequency as needed to maximize comfort through the end of the patient's life   - ongoing education of supplemental oxygen provided to wife at bedside

## 2023-01-28 NOTE — CONSULT NOTE ADULT - ASSESSMENT
73 yo M h/o pancreatic cancer with metastatic dz, no longer receiving any cancer tx,  afib on Pradaxa p/w unresponsiveness after recent inability to urinate. DNR/DNI. GOC care with focus on comfort. Palliative care cs for further support, GOC, and symptom management

## 2023-01-28 NOTE — DISCHARGE NOTE NURSING/CASE MANAGEMENT/SOCIAL WORK - NSDCPEFALRISK_GEN_ALL_CORE
For information on Fall & Injury Prevention, visit: [email or web address] OR  [email or web address] OR  https://www.cdc.gov/steadi/patient.html

## 2023-01-28 NOTE — CONSULT NOTE ADULT - SUBJECTIVE AND OBJECTIVE BOX
Consult to: Discuss complex medical decision making related to goals of care    [facility] Geriatric and Palliative Consult Service:  Dr. Ashely Farfan: cell ([phone])  Dr. Yadi Nagel: cell ([phone])   Leonora Rodriguez NP: cell ([phone])  Rochelle Sofia NP: cell ([phone])   Froilan Sacrlet LSW: cell ([phone])     HPI:  73 yo M h/o pancreatic cancer with metastatic dz, afib on Pradaxa p/w unresponsiveness after recent inability to urinate. Wife at bedside (Divya) states that he is DNR/DNI and no longer receiving any cancer tx.Wife denies recent fever/ N/V/ pain complaints, focal numbness/weakness, syncope, other recent illness or hospitalizations. (28 Jan 2023 01:43)    Hospital course and GOC note on admission noted. Pt seen and examined at bedside, pts wife tearful at bedside. Pt unresponsive to verbal and physical stimuli. Appears overtly comfortable on NRB. Unable to obtain ROS, collateral obtained from team and family. Pt received dilaudid 2 mg IV PRN x3 in the last 24hs with acceptable relief of pain/dyspnea. No unexpected side effects of opioids noted: no acute nausea or myoclonus. GOC re adressed at bedside today, see note below.     PAST MEDICAL & SURGICAL HISTORY:  HTN (hypertension)  History of TIAs  Atrial fibrillation  Hyperlipidemia  Pancreatic cancer  No significant past surgical history    SOCIAL HISTORY:    Admitted from:  home   [ none ] Substance abuse, [ none ] Tobacco hx, [ none ] Alcohol hx, [ none ] Home Opioid Hx    FAMILY HISTORY:  FH: colon cancer (Mother)    FH: lung cancer (Father)   unable to obtain from patient due to poor mentation, family unable to give information, see H&P for history  Baseline ADLs (prior to admission): now total dependence     Allergies  No Known Allergies  Intolerances    Present Symptoms:   Pain: no non verbal signs of distress, flaring nostrils, accessory muscle use   Fatigue: unresponsive, functional quad   Nausea: none  Lack of Appetite: unable to safely tolerate po iso poor mentation   SOB: intermittently w mild dyspnea while on NRB, relieved with PRN Dilaudid   Depression: unable to assess   Anxiety: unable to assess   Review of Systems: [All others negative or Unable to obtain due to poor mentation]    MEDICATIONS  (STANDING):    MEDICATIONS  (PRN):  glycopyrrolate Injectable 0.4 milliGRAM(s) IV Push every 4 hours PRN secretions  HYDROmorphone  Injectable 2 milliGRAM(s) IV Push every 2 hours PRN Moderate Pain (4 - 6)  HYDROmorphone  Injectable 2 milliGRAM(s) IV Push every 1 hour PRN Severe Pain (7 - 10)      PHYSICAL EXAM:  Vital Signs Last 24 Hrs  T(C): 36.6 (28 Jan 2023 08:37), Max: 37.3 (27 Jan 2023 23:02)  T(F): 97.9 (28 Jan 2023 08:37), Max: 99.1 (27 Jan 2023 23:02)  HR: 87 (28 Jan 2023 08:37) (87 - 135)  BP: 80/90 (28 Jan 2023 08:37) (49/41 - 86/56)  BP(mean): --  RR: 30 (28 Jan 2023 08:37) (27 - 40)  SpO2: 95% (28 Jan 2023 08:37) (77% - 95%)    Parameters below as of 28 Jan 2023 08:37  Patient On (Oxygen Delivery Method): mask, nonrebreather  O2 Flow (L/min): 15      General: chronically ill apearing jaundiced man lying in bed unresponsive, oriented x 0 appears comfortable     Palliative Performance Scale/Karnofsky Score: 10  ECOG Performance: 4    HEENT: alopecia bl temporal wasting no abnormal lesion, dry mouth   Lungs: tachypnea/labored breathing, audible excessive secretions  CV: RRR, S1S2, tachycardia  GI: soft non distended non tender  incontinent               PEG/NG/OG tube  constipation  last BM:   : incontinent  oliguria/anuria  foster  Musculoskeletal: weakness x4 edema x4    ambulatory with assistance   mostly/fully bedbound/wheelchair bound  Skin: no abnormal skin lesions, poor skin turgor, pressure ulcer stage:   Neuro: no deficits, cognitive impairment dsyphagia/dysarthria paresis  Oral intake ability: unable/only mouth care, minimal moderate full capability    LABS:                        10.2   8.27  )-----------( 205      ( 27 Jan 2023 22:50 )             31.8     01-27    139  |  105  |  24<H>  ----------------------------<  76  5.3   |  17<L>  |  1.84<H>    Ca    8.3<L>      27 Jan 2023 22:50    TPro  4.5<L>  /  Alb  1.7<L>  /  TBili  18.4<H>  /  DBili  x   /  AST  755<H>  /  ALT  495<H>  /  AlkPhos  904<H>  01-27        RADIOLOGY & ADDITIONAL STUDIES:     Consult to: Discuss complex medical decision making related to goals of care    [facility] Geriatric and Palliative Consult Service:  Dr. Ashely Farfan: cell ([phone])  Dr. Yadi Nagel: cell ([phone])   Leonora Rodriguez NP: cell ([phone])  Rochelle Sofia NP: cell ([phone])   Froilan Scarlet LSW: cell ([phone])     HPI:  73 yo M h/o pancreatic cancer with metastatic dz, afib on Pradaxa p/w unresponsiveness after recent inability to urinate. Wife at bedside (Divya) states that he is DNR/DNI and no longer receiving any cancer tx.Wife denies recent fever/ N/V/ pain complaints, focal numbness/weakness, syncope, other recent illness or hospitalizations. (28 Jan 2023 01:43)    Hospital course and GOC note on admission noted. Pt seen and examined this morning at bedside, pts wife tearful at bedside. Pt unresponsive to verbal and physical stimuli. Appears overtly comfortable on NRB. Unable to obtain ROS, collateral obtained from team and family. Pt received dilaudid 2 mg IV PRN x3 in the last 24hs with acceptable relief of pain/dyspnea. No unexpected side effects of opioids noted: no acute nausea or myoclonus. GOC re adressed at bedside today, see note below.     PAST MEDICAL & SURGICAL HISTORY:  HTN (hypertension)  History of TIAs  Atrial fibrillation  Hyperlipidemia  Pancreatic cancer  No significant past surgical history    SOCIAL HISTORY:    Admitted from:  home   [ none ] Substance abuse, [ none ] Tobacco hx, [ none ] Alcohol hx, [ none ] Home Opioid Hx    FAMILY HISTORY:  FH: colon cancer (Mother)    FH: lung cancer (Father)   unable to obtain from patient due to poor mentation, family unable to give information, see H&P for history  Baseline ADLs (prior to admission): now total dependence     Allergies  No Known Allergies  Intolerances    Present Symptoms:   Pain: no non verbal signs of distress, flaring nostrils, accessory muscle use   Fatigue: unresponsive, functional quad   Nausea: none  Lack of Appetite: unable to safely tolerate po iso poor mentation   SOB: intermittently w mild dyspnea while on NRB, relieved with PRN Dilaudid   Depression: unable to assess   Anxiety: unable to assess   Review of Systems: [All others negative or Unable to obtain due to poor mentation]    MEDICATIONS  (STANDING):    MEDICATIONS  (PRN):  glycopyrrolate Injectable 0.4 milliGRAM(s) IV Push every 4 hours PRN secretions  HYDROmorphone  Injectable 2 milliGRAM(s) IV Push every 2 hours PRN Moderate Pain (4 - 6)  HYDROmorphone  Injectable 2 milliGRAM(s) IV Push every 1 hour PRN Severe Pain (7 - 10)      PHYSICAL EXAM:  Vital Signs Last 24 Hrs  T(C): 36.6 (28 Jan 2023 08:37), Max: 37.3 (27 Jan 2023 23:02)  T(F): 97.9 (28 Jan 2023 08:37), Max: 99.1 (27 Jan 2023 23:02)  HR: 87 (28 Jan 2023 08:37) (87 - 135)  BP: 80/90 (28 Jan 2023 08:37) (49/41 - 86/56)  BP(mean): --  RR: 30 (28 Jan 2023 08:37) (27 - 40)  SpO2: 95% (28 Jan 2023 08:37) (77% - 95%)    Parameters below as of 28 Jan 2023 08:37  Patient On (Oxygen Delivery Method): mask, nonrebreather  O2 Flow (L/min): 15      General: chronically ill apearing jaundiced man lying in bed unresponsive, oriented x 0 appears comfortable     Palliative Performance Scale/Karnofsky Score: 10  ECOG Performance: 4    HEENT: alopecia bl temporal wasting no abnormal lesion, dry mouth   Lungs: tachypnea/labored breathing, audible excessive secretions  CV: RRR, S1S2, tachycardia  GI: soft non distended non tender  incontinent               PEG/NG/OG tube  constipation  last BM:   : incontinent  oliguria/anuria  foster  Musculoskeletal: weakness x4 edema x4    ambulatory with assistance   mostly/fully bedbound/wheelchair bound  Skin: no abnormal skin lesions, poor skin turgor, pressure ulcer stage:   Neuro: no deficits, cognitive impairment dsyphagia/dysarthria paresis  Oral intake ability: unable/only mouth care, minimal moderate full capability    LABS:                        10.2   8.27  )-----------( 205      ( 27 Jan 2023 22:50 )             31.8     01-27    139  |  105  |  24<H>  ----------------------------<  76  5.3   |  17<L>  |  1.84<H>    Ca    8.3<L>      27 Jan 2023 22:50    TPro  4.5<L>  /  Alb  1.7<L>  /  TBili  18.4<H>  /  DBili  x   /  AST  755<H>  /  ALT  495<H>  /  AlkPhos  904<H>  01-27        RADIOLOGY & ADDITIONAL STUDIES:    reviewed    [facility] note:

## 2023-01-28 NOTE — H&P ADULT - ASSESSMENT
73 yo M h/o pancreatic cancer with metastatic dz, afib on Pradaxa p/w unresponsiveness after recent inability to urinate Patient was found to be septic likely 2/2 PNA. Patient admitted for comfort measures.

## 2023-01-28 NOTE — DISCHARGE NOTE FOR THE EXPIRED PATIENT - HOSPITAL COURSE
73 yo M h/o pancreatic cancer with metastatic dz, afib on Pradaxa p/w unresponsiveness after recent inability to urinate. Wife at bedside (iDvya) states that he is DNR/DNI and no longer receiving any cancer tx.Wife denies recent fever/ N/V/ pain complaints, focal numbness/weakness, syncope, other recent illness or hospitalizations.   On arrival to ED by EMS, pt found to be unresponsive, HR in 120's, hypoxic with pulseox 77% on 100% oxygen nonrebreather mask, blood pressure, BP 70/48, and rectal temp of 99.1. Bishop placed with no urine in bladder.  Lactate was 12.6.  Also in MARLEE with Cr of 1.84  Also in acute liver failure with AST/ALT of 755/495, bilirubin 18.4  Chest xray showed left infiltrate and generalized increased interstitial markings.  COVID neg    Patient was given 3L of IV fluid bolus but remained hypotensive, was then started on intravenous levophed for pressor support.  Goals of care were reviewed with wife Divya at bedside, and after discussion, wife stated DNR/DNI with comfort measures only.  Levophed was stopped, nonrebreather mask 100% oxygen was continued.  Pt continued to decline with non life-sustainable blood pressure, SBP in 60-70's.   Comfort measures included hydromorphine for pain/dyspnea, and robinol for secretion management.    On 23, 1:42PM, pt . Wife was at bedside.  Wife does not want autopsy.  Dr. Ocasio notified.     71 yo M h/o pancreatic cancer with metastatic dz, afib on Pradaxa p/w unresponsiveness after recent inability to urinate. Wife at bedside (Divya) states that he is DNR/DNI and no longer receiving any cancer tx.Wife denies recent fever/ N/V/ pain complaints, focal numbness/weakness, syncope, other recent illness or hospitalizations.  Admitted for severe sepsis, multi-organ failure including respiratory, kidney, liver, and pancreatic failure.  On arrival to ED by EMS, pt found to be unresponsive, HR in 120's, hypoxic with pulseox 77% on 100% oxygen nonrebreather mask, blood pressure, BP 70/48, and rectal temp of 99.1. Bishop placed with no urine in bladder.  Lactate was 12.6.  Also in MARLEE with Cr of 1.84  Also in acute liver failure with AST/ALT of 755/495, bilirubin 18.4  Chest xray showed left infiltrate and generalized increased interstitial markings.  COVID neg    Patient was given 3L of IV fluid bolus but remained hypotensive, was then started on intravenous levophed for pressor support. Vancomycin 1250mg IV was given.  Goals of care were reviewed with wife Divya at bedside, and after discussion, wife stated DNR/DNI with comfort measures only.  Levophed was stopped, nonrebreather mask 100% oxygen was continued.  Pt continued to decline with non life-sustainable blood pressure, SBP in 60-70's.   Comfort measures included hydromorphine for pain/dyspnea, and robinol for secretion management.    On 23, 1:42PM, pt . Wife was at bedside.  Wife does not want autopsy.  Dr. Ocasio notified.

## 2023-01-28 NOTE — H&P ADULT - PROBLEM SELECTOR PLAN 2
- plan as above hx of metastatic pancreatic cancer (unable to tolerate chemo)  Poor functional status  presents with AMS  Found to have possible PNA  s/p 2L bolus with BP in the 60s, was started on levophed  Patient is comfort measure only  c/w Dilaudid and glycopyrrolate as needed   Consulted palliative

## 2023-01-28 NOTE — DISCHARGE NOTE NURSING/CASE MANAGEMENT/SOCIAL WORK - PATIENT PORTAL LINK FT
You can access the FollowMyHealth Patient Portal offered by [facility] by registering at the following website: [email or web address]. By joining [facility]’s FollowMyHealth portal, you will also be able to view your health information using other applications (apps) compatible with our system.

## 2023-01-28 NOTE — PROGRESS NOTE ADULT - ASSESSMENT
73 yo M h/o pancreatic cancer with metastatic dz, afib on Pradaxa p/w unresponsiveness after recent inability to urinate. Wife at bedside (Divya) states that he is DNR/DNI and no longer receiving any cancer tx.Wife denies recent fever/ N/V/ pain complaints, focal numbness/weakness, syncope, other recent illness or hospitalizations. (28 Jan 2023 01:43)  pt was admitted last night  with possible sepsis   last night  i d/w ermd then to resident    pt and family requested for DNR DNI  COMFORT CARE   WAS GIVEN IVF AND ANTIBX  i requested  inhouse MD for examination    today 30 mnts back I was called  pt passed away   was dnr dni  metastatic ca of pancraese

## 2023-01-28 NOTE — H&P ADULT - ATTENDING COMMENTS
Pt seen on behalf of Dr Ocasio.  72 year old man with advanced metastatic disease from pancreatic cancer with functional status - unable to tolerate chemotherapy and has been off chemotherapy for some time.   Pt brought in on account of worsening altered mental status.   clinical findings and lab evaluation show sepsis with multiorgan failure  After discussion with family regarding his poor prognosis, non response to initial therapy, he was made DNR with comfort care only.  Aggressive management has since been withdrawn.     A/P  -  End of life care/ active dying  -  Multiorgan failure   - Sepsis   - Acute encephalopathy- septic , metabolic   - Advanced metastatic disease with pancreatic cancer    Plan   Comfort care only   Pain control   Palliative care consult   Grief counseling for family as needed

## 2023-02-02 LAB
CULTURE RESULTS: SIGNIFICANT CHANGE UP
SPECIMEN SOURCE: SIGNIFICANT CHANGE UP

## 2023-11-15 RX ORDER — NALOXONE HYDROCHLORIDE 4 MG/.1ML
1 SPRAY NASAL
Qty: 0 | Refills: 0 | DISCHARGE

## 2023-11-15 RX ORDER — ONDANSETRON 8 MG/1
1 TABLET, FILM COATED ORAL
Qty: 0 | Refills: 0 | DISCHARGE

## 2023-11-15 RX ORDER — PROCHLORPERAZINE MALEATE 5 MG
0 TABLET ORAL
Qty: 0 | Refills: 0 | DISCHARGE

## 2023-11-15 RX ORDER — FENTANYL CITRATE 50 UG/ML
0 INJECTION INTRAVENOUS
Qty: 0 | Refills: 0 | DISCHARGE

## 2023-11-15 RX ORDER — DOXAZOSIN MESYLATE 4 MG
0 TABLET ORAL
Qty: 0 | Refills: 0 | DISCHARGE

## 2023-11-15 RX ORDER — HYDROMORPHONE HYDROCHLORIDE 2 MG/ML
0 INJECTION INTRAMUSCULAR; INTRAVENOUS; SUBCUTANEOUS
Qty: 0 | Refills: 0 | DISCHARGE

## 2023-11-15 RX ORDER — DABIGATRAN ETEXILATE MESYLATE 150 MG/1
1 CAPSULE ORAL
Qty: 0 | Refills: 0 | DISCHARGE

## 2023-11-15 RX ORDER — DABIGATRAN ETEXILATE MESYLATE 150 MG/1
0 CAPSULE ORAL
Qty: 0 | Refills: 0 | DISCHARGE

## 2023-11-15 RX ORDER — ONDANSETRON 8 MG/1
0 TABLET, FILM COATED ORAL
Qty: 0 | Refills: 0 | DISCHARGE

## 2023-11-15 RX ORDER — AMIODARONE HYDROCHLORIDE 400 MG/1
0 TABLET ORAL
Qty: 0 | Refills: 0 | DISCHARGE

## 2023-11-15 RX ORDER — DOXAZOSIN MESYLATE 4 MG
1 TABLET ORAL
Qty: 0 | Refills: 0 | DISCHARGE

## 2023-11-15 RX ORDER — AMIODARONE HYDROCHLORIDE 400 MG/1
1 TABLET ORAL
Qty: 0 | Refills: 0 | DISCHARGE

## 2023-11-15 RX ORDER — LOSARTAN POTASSIUM 100 MG/1
0 TABLET, FILM COATED ORAL
Qty: 0 | Refills: 0 | DISCHARGE

## 2023-11-15 RX ORDER — PROCHLORPERAZINE MALEATE 5 MG
1 TABLET ORAL
Qty: 0 | Refills: 0 | DISCHARGE

## 2023-11-15 RX ORDER — HYDROMORPHONE HYDROCHLORIDE 2 MG/ML
1 INJECTION INTRAMUSCULAR; INTRAVENOUS; SUBCUTANEOUS
Qty: 0 | Refills: 0 | DISCHARGE

## 2023-11-15 RX ORDER — NALOXONE HYDROCHLORIDE 4 MG/.1ML
0 SPRAY NASAL
Qty: 0 | Refills: 0 | DISCHARGE

## 2023-11-15 RX ORDER — DILTIAZEM HCL 120 MG
1 CAPSULE, EXT RELEASE 24 HR ORAL
Qty: 0 | Refills: 0 | DISCHARGE

## 2023-11-15 RX ORDER — ATORVASTATIN CALCIUM 80 MG/1
1 TABLET, FILM COATED ORAL
Qty: 0 | Refills: 0 | DISCHARGE

## 2023-11-15 RX ORDER — ATORVASTATIN CALCIUM 80 MG/1
0 TABLET, FILM COATED ORAL
Qty: 0 | Refills: 0 | DISCHARGE

## 2023-11-15 RX ORDER — LOSARTAN POTASSIUM 100 MG/1
1 TABLET, FILM COATED ORAL
Qty: 0 | Refills: 0 | DISCHARGE

## 2023-11-15 RX ORDER — SENNA PLUS 8.6 MG/1
1 TABLET ORAL
Qty: 0 | Refills: 0 | DISCHARGE

## 2023-11-15 RX ORDER — DILTIAZEM HCL 120 MG
0 CAPSULE, EXT RELEASE 24 HR ORAL
Qty: 0 | Refills: 0 | DISCHARGE